# Patient Record
Sex: MALE | Race: BLACK OR AFRICAN AMERICAN | NOT HISPANIC OR LATINO | URBAN - METROPOLITAN AREA
[De-identification: names, ages, dates, MRNs, and addresses within clinical notes are randomized per-mention and may not be internally consistent; named-entity substitution may affect disease eponyms.]

---

## 2018-08-22 ENCOUNTER — EMERGENCY (EMERGENCY)
Facility: HOSPITAL | Age: 78
LOS: 0 days | Discharge: HOME | End: 2018-08-22
Attending: EMERGENCY MEDICINE
Payer: COMMERCIAL

## 2018-08-22 VITALS
HEIGHT: 68 IN | HEART RATE: 83 BPM | TEMPERATURE: 98 F | SYSTOLIC BLOOD PRESSURE: 149 MMHG | DIASTOLIC BLOOD PRESSURE: 84 MMHG | WEIGHT: 169.98 LBS | RESPIRATION RATE: 19 BRPM | OXYGEN SATURATION: 98 %

## 2018-08-22 VITALS
OXYGEN SATURATION: 99 % | SYSTOLIC BLOOD PRESSURE: 140 MMHG | TEMPERATURE: 98 F | RESPIRATION RATE: 18 BRPM | HEART RATE: 81 BPM | DIASTOLIC BLOOD PRESSURE: 85 MMHG

## 2018-08-22 DIAGNOSIS — M54.5 LOW BACK PAIN: ICD-10-CM

## 2018-08-22 DIAGNOSIS — M54.2 CERVICALGIA: ICD-10-CM

## 2018-08-22 DIAGNOSIS — E11.9 TYPE 2 DIABETES MELLITUS WITHOUT COMPLICATIONS: ICD-10-CM

## 2018-08-22 LAB — GLUCOSE BLDC GLUCOMTR-MCNC: 146 MG/DL — HIGH (ref 70–99)

## 2018-08-22 PROCEDURE — 99284 EMERGENCY DEPT VISIT MOD MDM: CPT

## 2018-08-22 RX ORDER — IBUPROFEN 200 MG
1 TABLET ORAL
Qty: 28 | Refills: 0
Start: 2018-08-22 | End: 2018-08-28

## 2018-08-22 RX ORDER — IBUPROFEN 200 MG
600 TABLET ORAL ONCE
Qty: 0 | Refills: 0 | Status: COMPLETED | OUTPATIENT
Start: 2018-08-22 | End: 2018-08-22

## 2018-08-22 RX ORDER — CYCLOBENZAPRINE HYDROCHLORIDE 10 MG/1
1 TABLET, FILM COATED ORAL
Qty: 15 | Refills: 0
Start: 2018-08-22 | End: 2018-08-26

## 2018-08-22 RX ORDER — CYCLOBENZAPRINE HYDROCHLORIDE 10 MG/1
10 TABLET, FILM COATED ORAL ONCE
Qty: 0 | Refills: 0 | Status: COMPLETED | OUTPATIENT
Start: 2018-08-22 | End: 2018-08-22

## 2018-08-22 RX ADMIN — Medication 600 MILLIGRAM(S): at 19:33

## 2018-08-22 RX ADMIN — CYCLOBENZAPRINE HYDROCHLORIDE 10 MILLIGRAM(S): 10 TABLET, FILM COATED ORAL at 19:33
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
## 2018-08-22 NOTE — ED PROVIDER NOTE - MUSCULOSKELETAL, MLM
Spine appears normal, range of motion is not limited, no muscle or joint tenderness. Has l. trapezius muscle soreness
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
## 2018-08-22 NOTE — ED PROVIDER NOTE - MEDICAL DECISION MAKING DETAILS
Ddx: Msk back and neck pain, no neuro deficits  Plan: ibuprofen, flexeril, d/c Pt feels well, declines any CT scans.

## 2018-08-22 NOTE — ED ADULT NURSE NOTE - NSIMPLEMENTINTERV_GEN_ALL_ED
Implemented All Universal Safety Interventions:  [place] to call system. Call bell, personal items and telephone within reach. Instruct patient to call for assistance. Room bathroom lighting operational. Non-slip footwear when patient is off stretcher. Physically safe environment: no spills, clutter or unnecessary equipment. Stretcher in lowest position, wheels locked, appropriate side rails in place.

## 2018-12-24 ENCOUNTER — EMERGENCY (EMERGENCY)
Facility: HOSPITAL | Age: 78
LOS: 0 days | Discharge: HOME | End: 2018-12-24
Attending: EMERGENCY MEDICINE
Payer: MEDICARE

## 2018-12-24 VITALS
SYSTOLIC BLOOD PRESSURE: 97 MMHG | TEMPERATURE: 98 F | HEART RATE: 63 BPM | HEIGHT: 68 IN | DIASTOLIC BLOOD PRESSURE: 56 MMHG | RESPIRATION RATE: 18 BRPM | WEIGHT: 167.99 LBS

## 2018-12-24 VITALS
HEART RATE: 70 BPM | OXYGEN SATURATION: 98 % | RESPIRATION RATE: 18 BRPM | TEMPERATURE: 98 F | SYSTOLIC BLOOD PRESSURE: 119 MMHG | DIASTOLIC BLOOD PRESSURE: 60 MMHG

## 2018-12-24 DIAGNOSIS — H57.12 OCULAR PAIN, LEFT EYE: ICD-10-CM

## 2018-12-24 DIAGNOSIS — H43.392 OTHER VITREOUS OPACITIES, LEFT EYE: ICD-10-CM

## 2018-12-24 LAB — GLUCOSE BLDC GLUCOMTR-MCNC: 197 MG/DL — HIGH (ref 70–99)

## 2018-12-24 PROCEDURE — 99282 EMERGENCY DEPT VISIT SF MDM: CPT

## 2018-12-24 NOTE — ED ADULT NURSE NOTE - OBJECTIVE STATEMENT
pt c/o of blurred vision worsening since Saturday in left eye, Pt states " I feel a string infront of my left eyebrow". Pt wears glasses at baseline. PMH diabetes. pt denies dizziness, headache. Pt does not have list of medications but states he takes medication as prescribed , unaware of dosages at this time. PMH cataract right eye. pt denies pain in eyes. Upon  no swelling or drianage form left eye

## 2018-12-24 NOTE — ED PROVIDER NOTE - OBJECTIVE STATEMENT
Pertinent PMH/PSH/FHx/SHx and Review of Systems contained within:  Patient presents to the ED for floaters in left eye that started 3 days ago.  now resolving but "wanted to get checked."  PMH of [place], cataract surgery.  VSs.  no trauma..  no other concersn.  now improving.  Non toxic.  Well appearing. No aggravating or relieving factors. No other pertinent PMH.  No other pertinent PSH.  No other pertinent FHx.  Patient denies EtOH/tobacco/illicit substance use. No fever/chills, No photophobia/eye pain/changes in vision, No ear pain/sore throat/dysphagia, No chest pain/palpitations, no SOB/cough/wheeze/stridor, No abdominal pain, No N/V/D, no dysuria/frequency/discharge, No neck/back pain, no rash, no changes in neurological status/function.

## 2018-12-24 NOTE — ED PROVIDER NOTE - MEDICAL DECISION MAKING DETAILS
Patient with floaters.  vision not affected.  has optho to f/u this week.  VSS.  otherwise baseline.  no trauma.  will f/u.  good teachback.  Discussed results and outcome of testing with the patient.  Patient advised to please follow up with their primary care doctor within the next 24 hours and return to the Emergency Department for worsening symptoms or any other concerns.  Patient advised that their doctor may call  to follow up on the specific results of the tests performed today in the emergency department.

## 2018-12-24 NOTE — ED PROVIDER NOTE - PHYSICAL EXAMINATION
Gen: Alert, NAD Head: NC, AT, PERRL, EOMI, normal lids/conjunctiva, no abrasion with stain, IOP 14/16, normal fields, ENT: normal hearing, patent oropharynx without erythema/exudate, uvula midline Neck: +supple, no tenderness/meningismus/JVD, +Trachea midline Pulm:  normal resp effort,  CV: +dist pulses Mskel: no edema/erythema/cyanosis Skin: no rash Neuro: AAOx3, no sensory/motor deficits, CN 2-12 intact Gen: Alert, NAD Head: NC, AT, PERRL, EOMI, normal lids/conjunctiva, no abrasion with stain, IOP 10/11, normal fields, ENT: normal hearing, patent oropharynx without erythema/exudate, uvula midline Neck: +supple, no tenderness/meningismus/JVD, +Trachea midline Pulm:  normal resp effort,  CV: +dist pulses Mskel: no edema/erythema/cyanosis Skin: no rash Neuro: AAOx3, no sensory/motor deficits, CN 2-12 intact

## 2018-12-24 NOTE — ED ADULT NURSE NOTE - NSIMPLEMENTINTERV_GEN_ALL_ED
Implemented All Universal Safety Interventions:  [place] to call system. Call bell, personal items and telephone within reach. Instruct patient to call for assistance. Room bathroom lighting operational. Non-slip footwear when patient is off stretcher. Physically safe environment: no spills, clutter or unnecessary equipment. Stretcher in lowest position, wheels locked, appropriate side rails in place.

## 2023-02-16 ENCOUNTER — INPATIENT (INPATIENT)
Facility: HOSPITAL | Age: 83
LOS: 2 days | Discharge: ANOTHER HOSPITAL | End: 2023-02-19
Attending: INTERNAL MEDICINE | Admitting: INTERNAL MEDICINE
Payer: MEDICARE

## 2023-02-16 VITALS
HEART RATE: 99 BPM | SYSTOLIC BLOOD PRESSURE: 188 MMHG | WEIGHT: 169.98 LBS | OXYGEN SATURATION: 100 % | DIASTOLIC BLOOD PRESSURE: 98 MMHG | HEIGHT: 68 IN | TEMPERATURE: 98 F | RESPIRATION RATE: 16 BRPM
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
## 2023-02-16 LAB
ALBUMIN SERPL ELPH-MCNC: 3.3 G/DL — SIGNIFICANT CHANGE UP (ref 3.3–5)
ALP SERPL-CCNC: 75 U/L — SIGNIFICANT CHANGE UP (ref 40–120)
ALT FLD-CCNC: 19 U/L — SIGNIFICANT CHANGE UP (ref 12–78)
ANION GAP SERPL CALC-SCNC: 9 MMOL/L — SIGNIFICANT CHANGE UP (ref 5–17)
APPEARANCE UR: CLEAR — SIGNIFICANT CHANGE UP
APTT BLD: 32.9 SEC — SIGNIFICANT CHANGE UP (ref 27.5–35.5)
AST SERPL-CCNC: 13 U/L — LOW (ref 15–37)
BACTERIA # UR AUTO: ABNORMAL
BASOPHILS # BLD AUTO: 0.02 K/UL — SIGNIFICANT CHANGE UP (ref 0–0.2)
BASOPHILS NFR BLD AUTO: 0.1 % — SIGNIFICANT CHANGE UP (ref 0–2)
BILIRUB SERPL-MCNC: 1 MG/DL — SIGNIFICANT CHANGE UP (ref 0.2–1.2)
BILIRUB UR-MCNC: NEGATIVE — SIGNIFICANT CHANGE UP
BLD GP AB SCN SERPL QL: SIGNIFICANT CHANGE UP
BUN SERPL-MCNC: 56 MG/DL — HIGH (ref 7–23)
CALCIUM SERPL-MCNC: 9 MG/DL — SIGNIFICANT CHANGE UP (ref 8.5–10.1)
CHLORIDE SERPL-SCNC: 113 MMOL/L — HIGH (ref 96–108)
CO2 SERPL-SCNC: 21 MMOL/L — LOW (ref 22–31)
COLOR SPEC: YELLOW — SIGNIFICANT CHANGE UP
CREAT SERPL-MCNC: 4.93 MG/DL — HIGH (ref 0.5–1.3)
DIFF PNL FLD: ABNORMAL
EGFR: 11 ML/MIN/1.73M2 — LOW
EOSINOPHIL # BLD AUTO: 0 K/UL — SIGNIFICANT CHANGE UP (ref 0–0.5)
EOSINOPHIL NFR BLD AUTO: 0 % — SIGNIFICANT CHANGE UP (ref 0–6)
EPI CELLS # UR: SIGNIFICANT CHANGE UP
FLUAV AG NPH QL: SIGNIFICANT CHANGE UP
FLUBV AG NPH QL: SIGNIFICANT CHANGE UP
GLUCOSE SERPL-MCNC: 140 MG/DL — HIGH (ref 70–99)
GLUCOSE UR QL: 1000 MG/DL
HCT VFR BLD CALC: 37.2 % — LOW (ref 39–50)
HGB BLD-MCNC: 11.6 G/DL — LOW (ref 13–17)
IMM GRANULOCYTES NFR BLD AUTO: 0.6 % — SIGNIFICANT CHANGE UP (ref 0–0.9)
INR BLD: 1.15 RATIO — SIGNIFICANT CHANGE UP (ref 0.88–1.16)
KETONES UR-MCNC: ABNORMAL
LEUKOCYTE ESTERASE UR-ACNC: NEGATIVE — SIGNIFICANT CHANGE UP
LYMPHOCYTES # BLD AUTO: 0.47 K/UL — LOW (ref 1–3.3)
LYMPHOCYTES # BLD AUTO: 2.6 % — LOW (ref 13–44)
MCHC RBC-ENTMCNC: 27.8 PG — SIGNIFICANT CHANGE UP (ref 27–34)
MCHC RBC-ENTMCNC: 31.2 G/DL — LOW (ref 32–36)
MCV RBC AUTO: 89 FL — SIGNIFICANT CHANGE UP (ref 80–100)
MONOCYTES # BLD AUTO: 0.94 K/UL — HIGH (ref 0–0.9)
MONOCYTES NFR BLD AUTO: 5.2 % — SIGNIFICANT CHANGE UP (ref 2–14)
NEUTROPHILS # BLD AUTO: 16.47 K/UL — HIGH (ref 1.8–7.4)
NEUTROPHILS NFR BLD AUTO: 91.5 % — HIGH (ref 43–77)
NITRITE UR-MCNC: NEGATIVE — SIGNIFICANT CHANGE UP
NRBC # BLD: 0 /100 WBCS — SIGNIFICANT CHANGE UP (ref 0–0)
PH UR: 6 — SIGNIFICANT CHANGE UP (ref 5–8)
PLATELET # BLD AUTO: 166 K/UL — SIGNIFICANT CHANGE UP (ref 150–400)
POTASSIUM SERPL-MCNC: 4.8 MMOL/L — SIGNIFICANT CHANGE UP (ref 3.5–5.3)
POTASSIUM SERPL-SCNC: 4.8 MMOL/L — SIGNIFICANT CHANGE UP (ref 3.5–5.3)
PROT SERPL-MCNC: 7.3 GM/DL — SIGNIFICANT CHANGE UP (ref 6–8.3)
PROT UR-MCNC: 500 MG/DL
PROTHROM AB SERPL-ACNC: 13.7 SEC — HIGH (ref 10.5–13.4)
RBC # BLD: 4.18 M/UL — LOW (ref 4.2–5.8)
RBC # FLD: 14.5 % — SIGNIFICANT CHANGE UP (ref 10.3–14.5)
RBC CASTS # UR COMP ASSIST: SIGNIFICANT CHANGE UP /HPF (ref 0–4)
SARS-COV-2 RNA SPEC QL NAA+PROBE: DETECTED
SODIUM SERPL-SCNC: 143 MMOL/L — SIGNIFICANT CHANGE UP (ref 135–145)
SP GR SPEC: 1.01 — SIGNIFICANT CHANGE UP (ref 1.01–1.02)
UROBILINOGEN FLD QL: NEGATIVE MG/DL — SIGNIFICANT CHANGE UP
WBC # BLD: 18 K/UL — HIGH (ref 3.8–10.5)
WBC # FLD AUTO: 18 K/UL — HIGH (ref 3.8–10.5)
WBC UR QL: NEGATIVE — SIGNIFICANT CHANGE UP

## 2023-02-16 PROCEDURE — 93010 ELECTROCARDIOGRAM REPORT: CPT

## 2023-02-16 PROCEDURE — 72125 CT NECK SPINE W/O DYE: CPT | Mod: 26,MA

## 2023-02-16 PROCEDURE — 73552 X-RAY EXAM OF FEMUR 2/>: CPT | Mod: 26,RT

## 2023-02-16 PROCEDURE — 71045 X-RAY EXAM CHEST 1 VIEW: CPT | Mod: 26

## 2023-02-16 PROCEDURE — 70450 CT HEAD/BRAIN W/O DYE: CPT | Mod: 26,MA

## 2023-02-16 PROCEDURE — 74176 CT ABD & PELVIS W/O CONTRAST: CPT | Mod: 26,MA

## 2023-02-16 PROCEDURE — 99285 EMERGENCY DEPT VISIT HI MDM: CPT

## 2023-02-16 PROCEDURE — 73502 X-RAY EXAM HIP UNI 2-3 VIEWS: CPT | Mod: 26,RT

## 2023-02-16 RX ORDER — MORPHINE SULFATE 50 MG/1
4 CAPSULE, EXTENDED RELEASE ORAL ONCE
Refills: 0 | Status: DISCONTINUED | OUTPATIENT
Start: 2023-02-16 | End: 2023-02-16

## 2023-02-16 RX ORDER — CEFTRIAXONE 500 MG/1
2000 INJECTION, POWDER, FOR SOLUTION INTRAMUSCULAR; INTRAVENOUS ONCE
Refills: 0 | Status: COMPLETED | OUTPATIENT
Start: 2023-02-16 | End: 2023-02-16

## 2023-02-16 RX ORDER — AZITHROMYCIN 500 MG/1
500 TABLET, FILM COATED ORAL ONCE
Refills: 0 | Status: COMPLETED | OUTPATIENT
Start: 2023-02-16 | End: 2023-02-16

## 2023-02-16 RX ADMIN — MORPHINE SULFATE 4 MILLIGRAM(S): 50 CAPSULE, EXTENDED RELEASE ORAL at 19:34

## 2023-02-16 NOTE — ED ADULT NURSE NOTE - ED STAT RN HANDOFF DETAILS
Report received from RN at this time. Assessment available on [facility]. will continue to monitor

## 2023-02-16 NOTE — ED PROVIDER NOTE - OBJECTIVE STATEMENT
82M PMHx of DM presenting with right hip pain s/p mechanical fall today. Describes slipping in the kitchen and falling on his right side w/ right sided hip pain mild. Denies any LOC. Denies any antiplatelet or AC.     Otherwise: denies any chest pain, abdominal pain, shortness of breath, nausea/vomiting, head trauma, neck pain, back pain, hip pain, other extremity injury, LOC, syncope, lightheadedness, anticoagulation use, headaches, visual complaints, rashes, fevers/chills, difficulty ambulating, abrasions, lacerations.

## 2023-02-16 NOTE — ED PROVIDER NOTE - CLINICAL SUMMARY MEDICAL DECISION MAKING FREE TEXT BOX
Pt with no hx of anticoagulation, HTN p/w right hip pain, right fem neck fx, s/p mechanical fall today ( no ) LOC. Normal appearing without any signs or symptoms of serious injury on secondary trauma survey. Low suspicion for intracranial hemorrhage or other intracranial traumatic injury. No periorbital or posterior periauricular ecchymosis to suggest skull fracture. No abdominal ecchymosis to indicate concern for serious trauma to the thorax or abdomen. Pelvis without evidence of injury and patient is neurologically intact. Stable gait and tolerating PO. Otherwise no other evidence of deformity or joint instability.  - CT HEAD and NECK  - XR hip femur = fem neck fx right sided.  - pre-op labs, Found to be in ARF, Cr 4.9, (+) COVID, elevated WBC.  CXR (+) RLL RUL, pna?  - Septic labs, ceftriaxone/azithromycin for pna, IVF.  - Pain control PRN, Anti-emetics PRN  - ortho consult  - admit medical

## 2023-02-16 NOTE — ED ADULT TRIAGE NOTE - SOURCE OF INFORMATION
Called and spoke with the patient. Informed them of the results/orders. Understanding was verbalized and they agreed to follow Dr. Lilliana Ortiz orders. Patient/EMS

## 2023-02-16 NOTE — ED ADULT NURSE NOTE - CHIEF COMPLAINT QUOTE
BIBA as per patient c/o R hip pain s/t mechanical fall at 11am this morning. RLE appears rotated and shortened.  Hx: DM

## 2023-02-16 NOTE — ED ADULT NURSE NOTE - OBJECTIVE STATEMENT
82M PMHx DM presents to the ED s/p mechanical fall at gas station today., patient states I think I fractured my right hip , I cant stand on it. denies loc denies blood thinner use

## 2023-02-16 NOTE — ED ADULT TRIAGE NOTE - WEIGHT IN KG
Reason for Call:  Form, our goal is to have forms completed with 72 hours, however, some forms may require a visit or additional information.    Type of letter, form or note:  medical    Who is the form from?: Home care    Where did the form come from: form was faxed in    What clinic location was the form placed at?: [facility]    Where the form was placed: 's Box: Marbin Rouse MD    What number is listed as a contact on the form?: [phone]       Additional comments: SN 1 w 2, 3 as needed; HA 1 day 1; HA 1 w 2    Call taken on 5/17/2018 at 10:44 AM by Rachana Ontiveros         77.1

## 2023-02-16 NOTE — ED PROVIDER NOTE - CARE PLAN
1 Principal Discharge DX:	Femoral neck fracture  Secondary Diagnosis:	Acute renal failure  Secondary Diagnosis:	Pneumonia

## 2023-02-16 NOTE — ED ADULT NURSE REASSESSMENT NOTE - NS ED NURSE REASSESS COMMENT FT1
Safety checks compld + maintd. T+P Q encouraged. IV sites checked Q2+remains WDL. Skin care and complete care rendered. Fall +skin precs in place. Call bell within reach and safety measures in place. Son at bedside and belongings left behind bed. AOx3 but some episodes of forgetfulness noted. Reoriented

## 2023-02-16 NOTE — ED ADULT TRIAGE NOTE - CHIEF COMPLAINT QUOTE
BIBA as per patient c/o R hip pain s/t mechanical fall at 11am this morning. RLE appears rotated and shortened. BIBA as per patient c/o R hip pain s/t mechanical fall at 11am this morning. RLE appears rotated and shortened.  Hx: DM

## 2023-02-17 PROBLEM — E11.9 TYPE 2 DIABETES MELLITUS WITHOUT COMPLICATIONS: Chronic | Status: ACTIVE | Noted: 2018-12-24

## 2023-02-17 PROBLEM — I50.9 HEART FAILURE, UNSPECIFIED: Chronic | Status: ACTIVE | Noted: 2018-12-24

## 2023-02-17 LAB
ABO RH CONFIRMATION: SIGNIFICANT CHANGE UP
ANION GAP SERPL CALC-SCNC: 17 MMOL/L — SIGNIFICANT CHANGE UP (ref 5–17)
APTT BLD: 33.9 SEC — SIGNIFICANT CHANGE UP (ref 27.5–35.5)
BUN SERPL-MCNC: 57 MG/DL — HIGH (ref 7–23)
CALCIUM SERPL-MCNC: 8.7 MG/DL — SIGNIFICANT CHANGE UP (ref 8.5–10.1)
CHLORIDE SERPL-SCNC: 112 MMOL/L — HIGH (ref 96–108)
CK SERPL-CCNC: 145 U/L — SIGNIFICANT CHANGE UP (ref 26–308)
CO2 SERPL-SCNC: 15 MMOL/L — LOW (ref 22–31)
CREAT SERPL-MCNC: 4.56 MG/DL — HIGH (ref 0.5–1.3)
EGFR: 12 ML/MIN/1.73M2 — LOW
GLUCOSE BLDC GLUCOMTR-MCNC: 343 MG/DL — HIGH (ref 70–99)
GLUCOSE SERPL-MCNC: 166 MG/DL — HIGH (ref 70–99)
HCT VFR BLD CALC: 40.1 % — SIGNIFICANT CHANGE UP (ref 39–50)
HGB BLD-MCNC: 12.2 G/DL — LOW (ref 13–17)
INR BLD: 1.23 RATIO — HIGH (ref 0.88–1.16)
LACTATE SERPL-SCNC: 1.7 MMOL/L — SIGNIFICANT CHANGE UP (ref 0.7–2)
LACTATE SERPL-SCNC: 5.1 MMOL/L — CRITICAL HIGH (ref 0.7–2)
MCHC RBC-ENTMCNC: 27.5 PG — SIGNIFICANT CHANGE UP (ref 27–34)
MCHC RBC-ENTMCNC: 30.4 G/DL — LOW (ref 32–36)
MCV RBC AUTO: 90.5 FL — SIGNIFICANT CHANGE UP (ref 80–100)
NRBC # BLD: 0 /100 WBCS — SIGNIFICANT CHANGE UP (ref 0–0)
NT-PROBNP SERPL-SCNC: 6136 PG/ML — HIGH (ref 0–450)
PLATELET # BLD AUTO: 179 K/UL — SIGNIFICANT CHANGE UP (ref 150–400)
POTASSIUM SERPL-MCNC: 4.8 MMOL/L — SIGNIFICANT CHANGE UP (ref 3.5–5.3)
POTASSIUM SERPL-SCNC: 4.8 MMOL/L — SIGNIFICANT CHANGE UP (ref 3.5–5.3)
PROTHROM AB SERPL-ACNC: 14.8 SEC — HIGH (ref 10.5–13.4)
RBC # BLD: 4.43 M/UL — SIGNIFICANT CHANGE UP (ref 4.2–5.8)
RBC # FLD: 14.5 % — SIGNIFICANT CHANGE UP (ref 10.3–14.5)
SODIUM SERPL-SCNC: 144 MMOL/L — SIGNIFICANT CHANGE UP (ref 135–145)
TROPONIN I, HIGH SENSITIVITY RESULT: 135.5 NG/L — HIGH
TROPONIN I, HIGH SENSITIVITY RESULT: 204.8 NG/L — HIGH
TROPONIN I, HIGH SENSITIVITY RESULT: 251 NG/L — HIGH
TROPONIN I, HIGH SENSITIVITY RESULT: 255.1 NG/L — HIGH
WBC # BLD: 19.22 K/UL — HIGH (ref 3.8–10.5)
WBC # FLD AUTO: 19.22 K/UL — HIGH (ref 3.8–10.5)

## 2023-02-17 PROCEDURE — 99223 1ST HOSP IP/OBS HIGH 75: CPT

## 2023-02-17 PROCEDURE — 93306 TTE W/DOPPLER COMPLETE: CPT | Mod: 26

## 2023-02-17 RX ORDER — DEXTROSE 50 % IN WATER 50 %
25 SYRINGE (ML) INTRAVENOUS ONCE
Refills: 0 | Status: DISCONTINUED | OUTPATIENT
Start: 2023-02-17 | End: 2023-02-19

## 2023-02-17 RX ORDER — SODIUM CHLORIDE 9 MG/ML
1000 INJECTION INTRAMUSCULAR; INTRAVENOUS; SUBCUTANEOUS ONCE
Refills: 0 | Status: COMPLETED | OUTPATIENT
Start: 2023-02-17 | End: 2023-02-17

## 2023-02-17 RX ORDER — PREDNISOLONE SODIUM PHOSPHATE 1 %
0 DROPS OPHTHALMIC (EYE)
Qty: 0 | Refills: 0 | DISCHARGE

## 2023-02-17 RX ORDER — DORZOLAMIDE HYDROCHLORIDE TIMOLOL MALEATE 20; 5 MG/ML; MG/ML
0 SOLUTION/ DROPS OPHTHALMIC
Qty: 0 | Refills: 0 | DISCHARGE

## 2023-02-17 RX ORDER — LOSARTAN POTASSIUM 100 MG/1
50 TABLET, FILM COATED ORAL DAILY
Refills: 0 | Status: DISCONTINUED | OUTPATIENT
Start: 2023-02-17 | End: 2023-02-17

## 2023-02-17 RX ORDER — DEXTROSE 50 % IN WATER 50 %
15 SYRINGE (ML) INTRAVENOUS ONCE
Refills: 0 | Status: DISCONTINUED | OUTPATIENT
Start: 2023-02-17 | End: 2023-02-19

## 2023-02-17 RX ORDER — SODIUM CHLORIDE 9 MG/ML
1000 INJECTION, SOLUTION INTRAVENOUS
Refills: 0 | Status: DISCONTINUED | OUTPATIENT
Start: 2023-02-17 | End: 2023-02-19

## 2023-02-17 RX ORDER — SODIUM CHLORIDE 9 MG/ML
1000 INJECTION, SOLUTION INTRAVENOUS ONCE
Refills: 0 | Status: COMPLETED | OUTPATIENT
Start: 2023-02-17 | End: 2023-02-17

## 2023-02-17 RX ORDER — LOSARTAN POTASSIUM 100 MG/1
0 TABLET, FILM COATED ORAL
Qty: 0 | Refills: 0 | DISCHARGE

## 2023-02-17 RX ORDER — GLUCAGON INJECTION, SOLUTION 0.5 MG/.1ML
1 INJECTION, SOLUTION SUBCUTANEOUS ONCE
Refills: 0 | Status: DISCONTINUED | OUTPATIENT
Start: 2023-02-17 | End: 2023-02-19

## 2023-02-17 RX ORDER — CEFTRIAXONE 500 MG/1
1000 INJECTION, POWDER, FOR SOLUTION INTRAMUSCULAR; INTRAVENOUS EVERY 24 HOURS
Refills: 0 | Status: DISCONTINUED | OUTPATIENT
Start: 2023-02-18 | End: 2023-02-19

## 2023-02-17 RX ORDER — BRIMONIDINE TARTRATE 2 MG/MG
0 SOLUTION/ DROPS OPHTHALMIC
Qty: 0 | Refills: 3 | DISCHARGE

## 2023-02-17 RX ORDER — AZITHROMYCIN 500 MG/1
500 TABLET, FILM COATED ORAL EVERY 24 HOURS
Refills: 0 | Status: DISCONTINUED | OUTPATIENT
Start: 2023-02-18 | End: 2023-02-19

## 2023-02-17 RX ORDER — INSULIN LISPRO 100/ML
VIAL (ML) SUBCUTANEOUS
Refills: 0 | Status: DISCONTINUED | OUTPATIENT
Start: 2023-02-17 | End: 2023-02-19

## 2023-02-17 RX ORDER — LABETALOL HCL 100 MG
5 TABLET ORAL ONCE
Refills: 0 | Status: COMPLETED | OUTPATIENT
Start: 2023-02-17 | End: 2023-02-17

## 2023-02-17 RX ORDER — CARVEDILOL PHOSPHATE 80 MG/1
12.5 CAPSULE, EXTENDED RELEASE ORAL EVERY 12 HOURS
Refills: 0 | Status: DISCONTINUED | OUTPATIENT
Start: 2023-02-17 | End: 2023-02-18

## 2023-02-17 RX ORDER — HEPARIN SODIUM 5000 [USP'U]/ML
5000 INJECTION INTRAVENOUS; SUBCUTANEOUS ONCE
Refills: 0 | Status: COMPLETED | OUTPATIENT
Start: 2023-02-17 | End: 2023-02-17

## 2023-02-17 RX ORDER — HYDRALAZINE HCL 50 MG
50 TABLET ORAL THREE TIMES A DAY
Refills: 0 | Status: DISCONTINUED | OUTPATIENT
Start: 2023-02-17 | End: 2023-02-19

## 2023-02-17 RX ORDER — ATORVASTATIN CALCIUM 80 MG/1
40 TABLET, FILM COATED ORAL AT BEDTIME
Refills: 0 | Status: DISCONTINUED | OUTPATIENT
Start: 2023-02-17 | End: 2023-02-19

## 2023-02-17 RX ORDER — DEXTROSE 50 % IN WATER 50 %
12.5 SYRINGE (ML) INTRAVENOUS ONCE
Refills: 0 | Status: DISCONTINUED | OUTPATIENT
Start: 2023-02-17 | End: 2023-02-19

## 2023-02-17 RX ADMIN — CEFTRIAXONE 100 MILLIGRAM(S): 500 INJECTION, POWDER, FOR SOLUTION INTRAMUSCULAR; INTRAVENOUS at 02:41

## 2023-02-17 RX ADMIN — Medication 5 MILLIGRAM(S): at 06:11

## 2023-02-17 RX ADMIN — Medication 50 MILLIGRAM(S): at 17:41

## 2023-02-17 RX ADMIN — SODIUM CHLORIDE 1000 MILLILITER(S): 9 INJECTION INTRAMUSCULAR; INTRAVENOUS; SUBCUTANEOUS at 05:23

## 2023-02-17 RX ADMIN — AZITHROMYCIN 500 MILLIGRAM(S): 500 TABLET, FILM COATED ORAL at 02:40

## 2023-02-17 RX ADMIN — SODIUM CHLORIDE 1000 MILLILITER(S): 9 INJECTION, SOLUTION INTRAVENOUS at 03:34

## 2023-02-17 RX ADMIN — CARVEDILOL PHOSPHATE 12.5 MILLIGRAM(S): 80 CAPSULE, EXTENDED RELEASE ORAL at 17:41

## 2023-02-17 RX ADMIN — ATORVASTATIN CALCIUM 40 MILLIGRAM(S): 80 TABLET, FILM COATED ORAL at 21:51

## 2023-02-17 RX ADMIN — Medication 50 MILLIGRAM(S): at 21:50

## 2023-02-17 RX ADMIN — LOSARTAN POTASSIUM 50 MILLIGRAM(S): 100 TABLET, FILM COATED ORAL at 04:14

## 2023-02-17 RX ADMIN — SODIUM CHLORIDE 1000 MILLILITER(S): 9 INJECTION INTRAMUSCULAR; INTRAVENOUS; SUBCUTANEOUS at 06:14

## 2023-02-17 RX ADMIN — Medication 50 MILLIGRAM(S): at 04:14

## 2023-02-17 NOTE — H&P ADULT - ASSESSMENT
83 yo male w/ pmhx of CKD, DM, HTN presented to the ED BIBEMS 2/2 due to fall.        Right hip femoral fracture  - 2/2 due to fall  - consent obtained by ortho with son to procedure  - recommend Cardiology consult, given  elevated troponin, elevated BNP  - intermediate risk surgery    Pneumonia  - noted on imaging  -     COVID- 19   - No evidence of Hypoxia  - Continue to trend inflammatory markers      Delirium  - multifactorial in the acute setting from Hip fracture as well as PNA  - frequent orientation to time and place  - UA not suggestive for UTI      Elevated Tropinin  - continue to trend  - unable to assess if any chest pain, patient is currently confused    CHF  - no evidence of fluid overload at this time  - elevated BNP      DM  -ISS    Essential HTN  - resume home medication with holding parameters      Preventive measures  - DVT- SCD- as patient one medically optimize will being going to the OR  83 yo male w/ pmhx of CKD, DM, HTN presented to the ED BIBEMS 2/2 due to fall.        Right hip femoral fracture  - 2/2 due to fall  - consent obtained by ortho with son to procedure  - recommend Cardiology consult, given  elevated troponin, elevated BNP  - intermediate risk surgery  - Given patient age and as well as comorbidities, patient is high risk for postoperative complication.  - revised cardiac risk 2 points, 10.1 % 30 days risk of death, MI or cardiac arrest     Pneumonia  - noted on imaging  - c/w Rocephin as well as azithromycin     COVID- 19   - No evidence of Hypoxia  - Continue to trend inflammatory markers      Delirium  - multifactorial in the acute setting from Hip fracture as well as PNA  - frequent orientation to time and place  - UA not suggestive for UTI      Elevated Troponin  - continue to trend  - unable to assess if any chest pain, patient is currently confused  - TTE pending    CHF  - no evidence of fluid overload at this time  - elevated BNP      DM  -ISS    Essential HTN  - resume home medication with holding parameters      Preventive measures  - DVT- SCD- as patient one medically optimize will being going to the OR

## 2023-02-17 NOTE — ED ADULT NURSE REASSESSMENT NOTE - NS ED NURSE REASSESS COMMENT FT1
Safety checks compld + maintd. Noted attempting to get OOb. Requesting to get up but unable to. T+P Q encouraged. IV sites checked Q2+remains WDL. Skin care and complete care rendered. Fall +skin precs in place. Call bell within reach and safety measures in place. No family at bedside at this time. belongings left behind bed. AOx3 but some episodes of forgetfulness noted. Reoriented

## 2023-02-17 NOTE — CONSULT NOTE ADULT - ATTENDING COMMENTS
Above note reviewed. Agree with above.    The risks and benefits of operative and nonoperative management were discussed with the patient's HCP in detail. We discussed that without operative intervention, there would be prolonged bed rest associated with significant morbidity and mortality including but not limited to risk of fracture nonunion or malunion, chronic pain, limb length discrepancies, gait disturbance, pneumonia, thromboembolic event, bedsores, and death from associated complications. We discussed risks associated with surgery including not limited to infection, bleeding, wound complication, neurovascular injury, failure of fixation or reconstruction, instability, altered gait, and potential need for future surgery including complex reconstruction or even amputation. We extensively discussed the elevated risk of surgical intervention in the setting of patient's medical comorbidities that make morbidity and mortality risks extremely high. Given that the morbidity and mortality is high risk untreated hip fractures, we discussed that proceeding with surgical intervention in this setting of elevated surgical and anesthetic risk is reasonable. Risks associated with anesthesia were discussed including but not limited to myocardial infarction, stroke and death. Questions were sought and answered satisfactorily. After understanding risks, benefits, and alternatives to surgical management, informed consent was obtained.      Roddy Mckeon DO  Orthopedic and Spine Surgeon  [facility]

## 2023-02-17 NOTE — PATIENT PROFILE ADULT - DOES PATIENT HAVE ADVANCE DIRECTIVE
Pt presented to er with c/o abdominal pain on the rt upper quadrant of the abdomen , that radiated to the back, and upper back. Pt denies N/V at this time. Pt took 2 ASA and aleve PTA. Pt denies CP/SOB, denies  symptoms.    
No

## 2023-02-17 NOTE — H&P ADULT - NSHPLABSRESULTS_GEN_ALL_CORE
12.2   19.22  )----------(  179       ( 2023 08:00 )               40.1      144    |  112    |  57     ----------------------------<  166        ( 2023 08:00 )  4.8     |  15     |  4.56     Ca    8.7        ( 2023 08:00 )    TPro  7.3    /  Alb  3.3    /  TBili  1.0    /  DBili  x      /  AST  13     /  ALT  19     /  AlkPhos  75     ( 2023 19:25 )    LIVER FUNCTIONS - ( 2023 19:25 )  Alb: 3.3 g/dL / Pro: 7.3 gm/dL / ALK PHOS: 75 U/L / ALT: 19 U/L / AST: 13 U/L / GGT: x           PT/INR -  14.8 sec / 1.23 ratio   ( 2023 08:00 )       PTT -  33.9 sec   ( 2023 08:00 )  CAPILLARY BLOOD GLUCOSE        Urinalysis Basic - ( 2023 22:15 )    Color: Yellow / Appearance: Clear / S.015 / pH: x  Gluc: x / Ketone: Trace  / Bili: Negative / Urobili: Negative mg/dL   Blood: x / Protein: 500 mg/dL / Nitrite: Negative   Leuk Esterase: Negative / RBC: 0-2 /HPF / WBC Negative   Sq Epi: x / Non Sq Epi: Occasional / Bacteria: Occasional

## 2023-02-17 NOTE — PATIENT PROFILE ADULT - FUNCTIONAL ASSESSMENT - BASIC MOBILITY 6.
2-calculated by average/Not able to assess (calculate score using [facility] averaging method)

## 2023-02-17 NOTE — H&P ADULT - HISTORY OF PRESENT ILLNESS
81 yo male w/ pmhx of CKD, DM, HTN presented to the ED [facility] 2/2 due to fall. History obtained by Chart review as well as speaking to the son jay. The patient was at a gas station ambulating, slipped and fell landed on this right hip. Upon arrival to the ED, patient was noted to have COVID-19 PNA, as well as right hip fracture. Noted to have elevated BNP, trop x1 elevated. CXR performed showed evidence of Right upper lobe opacities.

## 2023-02-17 NOTE — PROVIDER CONTACT NOTE (CRITICAL VALUE NOTIFICATION) - ACTION/TREATMENT ORDERED:
Hospitalist called Hospitalist called and made aware. Morning meds given early for BP management. No orders at this time.

## 2023-02-17 NOTE — CONSULT NOTE ADULT - SUBJECTIVE AND OBJECTIVE BOX
Pt is an 82M with PMH of DM, HLD, CKD here after a mechanical fall earlier today. Per pt's son Oscar, pt was entering a gas station [facility] and tripped on a stair, unwitnessed by pt's son. Son was called by pt to bring home, at home pt was unable to stand or bear weight, and pt was BIBEMS to [facility]. In the ER xrays revealed a right femoral neck fracture, and ortho was consulted for further care.    T(C): 37 (02-16-23 @ 23:21), Max: 37 (02-16-23 @ 23:21)  T(F): 98.6 (02-16-23 @ 23:21), Max: 98.6 (02-16-23 @ 23:21)  HR: 97 (02-16-23 @ 23:21) (92 - 99)  BP: 186/104 (02-16-23 @ 23:21) (186/104 - 197/93)  RR: 18 (02-16-23 @ 23:21) (16 - 18)  SpO2: 95% (02-16-23 @ 23:21) (95% - 100%)    RLE:  Skin: No erythema, edema or gross lesions noted.   Hip TTP, otherwise NTTP  SILT L2-S1  Motor: +EHL/FHL/TA/GSc  Compartments soft and compressible  Calves NTTP b/l  +2 DP    Secondary Survey:   LLE/RUE/LUE: No TTP over bony prominences, SILT, palpable pulses, full/painless range of motion, compartments soft    Spine: No bony tenderness. No palpable stepoffs.    Imaging:  Radiographs of the R hip demonstrate a femoral neck fracture with complete displacement    A/p:  Pt is an 82M with a right FN fx s/p mechanical fx  NWB RLE/Bedrest/PT/OT  Pain regimen PRN  DVT ppx: per primary team  At this time pt is AO x1 and unable to participate in surgical consent  Son, Oscar, although not HCP did not want to give consent for surgery without discussing further w family  At this time if pt's mental status improve and pt becomes AOx4 he may consent, however as of now pt unable to consent for surgery on his own  Please document medical clearance for the OR  Dispo per PT  Plan discussed w patient, who is in agreement with the above  Plan discussed w attending, who is in agreement with the above   Pt is an 82M with PMH of DM, HLD, CKD here after a mechanical fall earlier today. Per pt's son Oscar, pt was entering a gas station [facility] and tripped on a stair, unwitnessed by pt's son. Son was called by pt to bring home, at home pt was unable to stand or bear weight, and pt was BIBEMS to [facility]. In the ER xrays revealed a right femoral neck fracture, and ortho was consulted for further care.    T(C): 37 (02-16-23 @ 23:21), Max: 37 (02-16-23 @ 23:21)  T(F): 98.6 (02-16-23 @ 23:21), Max: 98.6 (02-16-23 @ 23:21)  HR: 97 (02-16-23 @ 23:21) (92 - 99)  BP: 186/104 (02-16-23 @ 23:21) (186/104 - 197/93)  RR: 18 (02-16-23 @ 23:21) (16 - 18)  SpO2: 95% (02-16-23 @ 23:21) (95% - 100%)    RLE:  Skin: No erythema, edema or gross lesions noted.   Hip TTP, otherwise NTTP  SILT L2-S1  Motor: +EHL/FHL/TA/GSc  Compartments soft and compressible  Calves NTTP b/l  +2 DP    Secondary Survey:   LLE/RUE/LUE: No TTP over bony prominences, SILT, palpable pulses, full/painless range of motion, compartments soft    Spine: No bony tenderness. No palpable stepoffs.    Imaging:  Radiographs of the R hip demonstrate a femoral neck fracture with complete displacement    A/p:  Pt is an 82M with a right FN fx s/p mechanical fx  NWB RLE/Bedrest/PT/OT  Pain regimen PRN  DVT ppx: per primary team  At this time pt is AO x1 and unable to participate in surgical consent  Son, Oscar, did not want to give consent for surgery without discussing further w family  At this time if pt's mental status improve and pt becomes AOx4 he may consent, however as of now pt unable to consent for surgery on his own  Please document medical clearance for the OR  Dispo per PT  Plan discussed w patient, who is in agreement with the above  Plan discussed w attending, who is in agreement with the above    *** ADDENDUM (2/17/2023, 09:05) ***  - Patient consented for surgery via Son/HCP.   - Plan to move forward with Right Hip Hemiarthroplasty today pending Medical/Other clearances.

## 2023-02-17 NOTE — PATIENT PROFILE ADULT - FALL HARM RISK - HARM RISK INTERVENTIONS

## 2023-02-17 NOTE — H&P ADULT - NSHPPHYSICALEXAM_GEN_ALL_CORE
General: NAD, resting comfortably in bed  HEENT: NC/AT, PERRLA, EOMI  Neck: thyroid normal, no nodules, no lymphadenopathy, no JVD  Lungs: CTA bilaterally, no rhonchi, no wheezes  Cardio: S1S2+, RRR, no murmurs  Abdominal: soft, NT, ND, BS+  Back: no CVA tenderness, no ulcers visualized in the back  Extremities: Right hip tenderness to palpation  Neuro: AAOx1(self), CN 2-12 grossly intact.  sensation intact in all extremities, 5/5 strength

## 2023-02-17 NOTE — PHARMACOTHERAPY INTERVENTION NOTE - COMMENTS
Recommended to order a Legionella urinary antigen since patient has been empirically started on azithromycin for the treatment of pneumonia.    Marion Mei, PharmD, BCIDP  Clinical Pharmacy Specialist, Infectious Diseases  Tele-Antimicrobial Stewardship Program (Tele-ASP)  Tele-ASP Phone: [phone]

## 2023-02-17 NOTE — CONSULT NOTE ADULT - SUBJECTIVE AND OBJECTIVE BOX
CHIEF COMPLAINT:  Patient is a 82y old  Male who presents with a chief complaint of Hip fracture (2023 11:55)      HPI:  81 yo male w/ pmhx of CKD, DM, HTN presented to the ED [facility]  due to fall. History obtained by Chart review as well as speaking to the son jay. The patient was at a gas station ambulating, slipped and fell landed on this right hip. Upon arrival to the ED, patient was noted to have COVID-19 PNA, as well as right hip fracture. Noted to have elevated BNP, trop x1 elevated. CXR performed showed evidence of Right upper lobe opacities. (2023 11:55)    ALLERGIES:  No Known Allergies      Home Medications:  carvedilol 12.5 mg oral tablet: 1 tab(s) orally 2 times a day (2023 15:03)  FARXIGA 10 MG TABLET: take 1 tablet by mouth once daily (2023 15:03)  glyBURIDE 5 mg oral tablet: 1 tab(s) orally 4 times a day (2023 15:03)  HYDRALAZINE 50 MG TABLET: take 1 tablet by mouth once daily (2023 15:03)  Januvia 50 mg oral tablet: 1 tab(s) orally once a day (2023 15:03)  LOSARTAN 50MG TAB: 1 tab(s) orally once a day (2023 15:03)  metFORMIN 500 mg oral tablet, extended release: 1 tab(s) orally once a day (2023 15:03)  repaglinide 2 mg oral tablet: 1 tab(s) orally 3 times a day (before meals) (2023 15:03)  SIMVASTATIN 40 MG TABLET: take 1 tablet by mouth once daily (2023 15:03)    PAST MEDICAL & SURGICAL HISTORY:  DM (diabetes mellitus)      CHF (congestive heart failure)        FAMILY HISTORY:      SOCIAL HISTORY:    REVIEW OF SYSTEMS:  General:  No wt loss, fevers, chills, night sweats  Eyes:  Good vision, no reported pain  ENT:  No sore throat, pain, runny nose, dysphagia  CV:  No pain, palpitations, hypo/hypertension  Resp:  No dyspnea, cough, tachypnea, wheezing  GI:  No pain, nausea, vomiting, diarrhea, constipation  :  No pain, bleeding, incontinence, nocturia  Muscle:  No pain, weakness  Breast:  No pain, abscess, mass, discharge  Neuro:  No weakness, tingling, memory problems  Psych:  No fatigue, insomnia, mood problems, depression  Endocrine:  No polyuria, polydipsia, cold/heat intolerance  Heme:  No petechiae, ecchymosis, easy bruisability  Skin:  No rash, tattoos, scars, edema    PHYSICAL EXAM:  Vital Signs:  Vital Signs Last 24 Hrs  T(C): 37.1 (2023 11:55), Max: 37.1 (2023 11:55)  T(F): 98.7 (2023 11:55), Max: 98.7 (2023 11:55)  HR: 101 (2023 11:55) (91 - 109)  BP: 178/99 (2023 11:55) (175/100 - 203/109)  BP(mean): 128 (2023 19:19) (128 - 128)  RR: 18 (2023 11:55) (16 - 18)  SpO2: 93% (2023 11:55) (93% - 100%)    Parameters below as of 2023 04:55  Patient On (Oxygen Delivery Method): room air      Tele:     Constitutional: well developed, normal appearance, well groomed, well nourished, no deformities and no acute distress.   Eyes: the conjunctiva exhibited no abnormalities and the eyelids demonstrated no xanthelasmas.   HEENT: normal oral mucosa, no oral pallor and no oral cyanosis.   Neck: normal jugular venous A waves present, normal jugular venous V waves present and no jugular venous jj A waves.   Pulmonary: no respiratory distress, normal respiratory rhythm and effort, no accessory muscle use and lungs were clear to auscultation bilaterally.   Cardiovascular: heart rate and rhythm were normal, normal S1 and S2 and no murmur, gallop, rub, heave or thrill are present.   Abdomen: soft, non-tender.  Musculoskeletal: the gait could not be assessed.   Extremities: no clubbing of the fingernails, no localized cyanosis, no petechial hemorrhages and no ischemic changes.   Skin: normal skin color and pigmentation, no rash, no venous stasis, no skin lesions, no skin ulcer and no xanthoma was observed.   Psychiatric: oriented to person, place, and time, the affect was normal, the mood was normal and not feeling anxious.      LABORATORY:                          12.2   19.22 )-----------( 179      ( 2023 08:00 )             40.1         144  |  112<H>  |  57<H>  ----------------------------<  166<H>  4.8   |  15<L>  |  4.56<H>    Ca    8.7      2023 08:00    TPro  7.3  /  Alb  3.3  /  TBili  1.0  /  DBili  x   /  AST  13<L>  /  ALT  19  /  AlkPhos  75  02      CARDIAC MARKERS ( 2023 12:20 )  x     / x     / 145 U/L / x     / x            LIVER FUNCTIONS - ( 2023 19:25 )  Alb: 3.3 g/dL / Pro: 7.3 gm/dL / ALK PHOS: 75 U/L / ALT: 19 U/L / AST: 13 U/L / GGT: x           PT/INR - ( 2023 08:00 )   PT: 14.8 sec;   INR: 1.23 ratio         PTT - ( 2023 08:00 )  PTT:33.9 sec  Urinalysis Basic - ( 2023 22:15 )    Color: Yellow / Appearance: Clear / S.015 / pH: x  Gluc: x / Ketone: Trace  / Bili: Negative / Urobili: Negative mg/dL   Blood: x / Protein: 500 mg/dL / Nitrite: Negative   Leuk Esterase: Negative / RBC: 0-2 /HPF / WBC Negative   Sq Epi: x / Non Sq Epi: Occasional / Bacteria: Occasional      BNPSerum Pro-Brain Natriuretic Peptide: 6136 pg/mL (23 @ 02:45)      IMAGING:  < from: CT Head No Cont (23 @ 22:32) >  IMPRESSION:  No acute intracranial pathology. Encephalomalacia left occipital right   parietal lobes, compatible with sequelae of previous/chronic infarct or   sequelae of previous trauma.    Atrophy and chronic small vessel ischemic changes.    Extensive degenerative changes cervical spine.    No acute cervical spine fracture.    < end of copied text >    < from: CT Abdomen and Pelvis No Cont (23 @ 22:34) >  ACC: [id number] EXAM:  CT ABDOMEN AND PELVIS   ORDERED BY: JOSE JHA     PROCEDURE DATE:  2023          INTERPRETATION:  CLINICAL INFORMATION: Right hip and abdominal pain after   fall. Acute renal failure.    COMPARISON: None.    CONTRAST/COMPLICATIONS:  IV Contrast: None  Oral Contrast: None  Complications: None    PROCEDURE:  CT of the Abdomen and Pelvis was performed.  Sagittal and coronal reformats were performed.    FINDINGS:  LOWER CHEST: Heart is enlarged. There is trace pericardial fluid.   Coronary, valvular and aortic calcifications are appreciated. Bibasilar   atelectatic changes are seen posteriorly. Motion artifact otherwise   limits evaluation for fine pulmonary parenchymal detail..    LIVER: Within normal limits.  BILEDUCTS: Normal caliber.  GALLBLADDER: There are layering stones versus sludge. Calcification   between the left and caudate lobes is likely sequelae of old   granulomatous disease or vascular in nature. The liver is otherwise   unremarkable..  SPLEEN:Within normal limits.  PANCREAS: Within normal limits.  ADRENALS: Nonspecific thickened appearance of the bilateral adrenal   glands, likely related to hyperplasia.  KIDNEYS/URETERS: Left-sided renal cysts. Bilateral renal hilar   calcifications are somewhat linear configuration likely vascular in   nature. Nonobstructing stones not excluded. Extensive bilateral   perinephric stranding is appreciated, symmetric. There is no urinary   tract obstruction.    BLADDER: Wall thickening.  REPRODUCTIVE ORGANS: Prostate gland is enlarged.    BOWEL: No bowel obstruction. Appendix  is normal. There is a small hiatal   hernia. Diffuse wall thickening of the visualized distal esophagus is   noted. There is questionable gastric wall thickening, though the stomach   is also under distended.  PERITONEUM: Trace fluid is seen within the cul-de-sac, abnormal in a male   patient..  VESSELS: Atherosclerotic changes.  RETROPERITONEUM/LYMPH NODES: No lymphadenopathy.  ABDOMINAL WALL: Generalized soft tissue edema..  BONES: There is a displaced right femoral neck fracture. This is of   uncertain age, though is at least subacute or acute nature.    IMPRESSION:  There is a displaced right femoral neck fracture, acute or subacute.    Urinary bladder wall thickening which may be related to sequelae of   chronic outlet obstruction from prostate enlargement. Superimposed   cystitis should be evaluated for clinically, correlation with urinalysis.    Symmetric bilateral perinephric stranding, which may indicate underlying   pyelonephritis/nephritis or nephropathy. Correlate clinically.    Nonspecific trace free fluid seen within the cul-de-sac.    Thickened appearance of the distal esophagus and stomach which may   represent esophagitis/gastritis. Correlate clinically.    Cardiomegaly.    Additional findings as above.    < end of copied text >    ASSESSMENT:      PLAN:       atorvastatin 40 milliGRAM(s) Oral at bedtime  azithromycin  IVPB 500 milliGRAM(s) IV Intermittent every 24 hours  carvedilol 12.5 milliGRAM(s) Oral every 12 hours  cefTRIAXone   IVPB 1000 milliGRAM(s) IV Intermittent every 24 hours  glucagon  Injectable 1 milliGRAM(s) IntraMuscular once  hydrALAZINE 50 milliGRAM(s) Oral three times a day  insulin lispro (ADMELOG) corrective regimen sliding scale   SubCutaneous three times a day before meals    check echo.  Pt is at high risk for poor overall outcome    Theodore Velez MD, FACC, NAIF, HALLIE, FACP  Director, Heart Failure Services  [facility]  , Department of Cardiology  [facility]     CHIEF COMPLAINT:  Patient is a 82y old  Male who presents with a chief complaint of Hip fracture (2023 11:55)      HPI:  82-year-old with hypertension, type 2 diabetes, chronic kidney disease, had a slip and fall onto his right hip found to have right hip fracture and noted to be COVID-19 positive for pneumonia.  Elevated troponin and BNP also noted.  He is seen resting comfortably on telemetry floor no immediate distress slowly is lethargic on pain medication now.  Lonnie Deluca is at the bedside.    ALLERGIES:  No Known Allergies      Home Medications:  carvedilol 12.5 mg oral tablet: 1 tab(s) orally 2 times a day (2023 15:03)  FARXIGA 10 MG TABLET: take 1 tablet by mouth once daily (2023 15:03)  glyBURIDE 5 mg oral tablet: 1 tab(s) orally 4 times a day (2023 15:03)  HYDRALAZINE 50 MG TABLET: take 1 tablet by mouth once daily (2023 15:03)  Januvia 50 mg oral tablet: 1 tab(s) orally once a day (2023 15:03)  LOSARTAN 50MG TAB: 1 tab(s) orally once a day (2023 15:03)  metFORMIN 500 mg oral tablet, extended release: 1 tab(s) orally once a day (2023 15:03)  repaglinide 2 mg oral tablet: 1 tab(s) orally 3 times a day (before meals) (2023 15:03)  SIMVASTATIN 40 MG TABLET: take 1 tablet by mouth once daily (2023 15:03)    PAST MEDICAL & SURGICAL HISTORY:  DM (diabetes mellitus)      CHF (congestive heart failure)        FAMILY HISTORY:  n/a    SOCIAL HISTORY:  no tobacco    REVIEW OF SYSTEMS:  General:  No wt loss, fevers, chills, night sweats  Eyes:  Good vision, no reported pain  ENT:  No sore throat, pain, runny nose, dysphagia  CV:  No pain, palpitations, hypo/hypertension  Resp:  No dyspnea, cough, tachypnea, wheezing  GI:  No pain, nausea, vomiting, diarrhea, constipation  :  No pain, bleeding, incontinence, nocturia  Muscle:  No pain, weakness  Breast:  No pain, abscess, mass, discharge  Neuro:  No weakness, tingling, memory problems  Psych:  No fatigue, insomnia, mood problems, depression  Endocrine:  No polyuria, polydipsia, cold/heat intolerance  Heme:  No petechiae, ecchymosis, easy bruisability  Skin:  No rash, edema    PHYSICAL EXAM:  Vital Signs:  Vital Signs Last 24 Hrs  T(C): 37.1 (2023 11:55), Max: 37.1 (2023 11:55)  T(F): 98.7 (2023 11:55), Max: 98.7 (2023 11:55)  HR: 101 (2023 11:55) (91 - 109)  BP: 178/99 (2023 11:55) (175/100 - 203/109)  BP(mean): 128 (2023 19:19) (128 - 128)  RR: 18 (2023 11:55) (16 - 18)  SpO2: 93% (2023 11:55) (93% - 100%)    Parameters below as of 2023 04:55  Patient On (Oxygen Delivery Method): room air      Tele: ST, PVCs    Constitutional: well developed, normal appearance, well groomed, well nourished, no deformities and no acute distress.   Eyes: the conjunctiva exhibited no abnormalities and the eyelids demonstrated no xanthelasmas.   HEENT: normal oral mucosa, no oral pallor and no oral cyanosis.   Neck: normal jugular venous A waves present, normal jugular venous V waves present and no jugular venous jj A waves.   Pulmonary: no respiratory distress, normal respiratory rhythm and effort, no accessory muscle use and lungs were clear to auscultation bilaterally.   Cardiovascular: heart rate and rhythm were normal, normal S1 and S2 and no murmur, gallop, rub, heave or thrill are present.   Abdomen: soft, non-tender.  Musculoskeletal: the gait could not be assessed.   Extremities: no clubbing of the fingernails, no localized cyanosis, no petechial hemorrhages and no ischemic changes.   Skin: normal skin color and pigmentation, no rash, no venous stasis, no skin lesions, no skin ulcer and no xanthoma was observed.       LABORATORY:                          12.2   19.22 )-----------( 179      ( 2023 08:00 )             40.1         144  |  112<H>  |  57<H>  ----------------------------<  166<H>  4.8   |  15<L>  |  4.56<H>    Ca    8.7      2023 08:00    TPro  7.3  /  Alb  3.3  /  TBili  1.0  /  DBili  x   /  AST  13<L>  /  ALT  19  /  AlkPhos  75  02-16      CARDIAC MARKERS ( 2023 12:20 )  x     / x     / 145 U/L / x     / x            LIVER FUNCTIONS - ( 2023 19:25 )  Alb: 3.3 g/dL / Pro: 7.3 gm/dL / ALK PHOS: 75 U/L / ALT: 19 U/L / AST: 13 U/L / GGT: x           PT/INR - ( 2023 08:00 )   PT: 14.8 sec;   INR: 1.23 ratio         PTT - ( 2023 08:00 )  PTT:33.9 sec  Urinalysis Basic - ( 2023 22:15 )    Color: Yellow / Appearance: Clear / S.015 / pH: x  Gluc: x / Ketone: Trace  / Bili: Negative / Urobili: Negative mg/dL   Blood: x / Protein: 500 mg/dL / Nitrite: Negative   Leuk Esterase: Negative / RBC: 0-2 /HPF / WBC Negative   Sq Epi: x / Non Sq Epi: Occasional / Bacteria: Occasional      BNPSerum Pro-Brain Natriuretic Peptide: 6136 pg/mL (23 @ 02:45)      IMAGING:  < from: CT Head No Cont (23 @ 22:32) >  IMPRESSION:  No acute intracranial pathology. Encephalomalacia left occipital right   parietal lobes, compatible with sequelae of previous/chronic infarct or   sequelae of previous trauma.    Atrophy and chronic small vessel ischemic changes.    Extensive degenerative changes cervical spine.    No acute cervical spine fracture.    < end of copied text >    < from: CT Abdomen and Pelvis No Cont (23 @ 22:34) >  ACC: [id number] EXAM:  CT ABDOMEN AND PELVIS   ORDERED BY: JOSE ARNOLD DATE:  2023          INTERPRETATION:  CLINICAL INFORMATION: Right hip and abdominal pain after   fall. Acute renal failure.    COMPARISON: None.    CONTRAST/COMPLICATIONS:  IV Contrast: None  Oral Contrast: None  Complications: None    PROCEDURE:  CT of the Abdomen and Pelvis was performed.  Sagittal and coronal reformats were performed.    FINDINGS:  LOWER CHEST: Heart is enlarged. There is trace pericardial fluid.   Coronary, valvular and aortic calcifications are appreciated. Bibasilar   atelectatic changes are seen posteriorly. Motion artifact otherwise   limits evaluation for fine pulmonary parenchymal detail..    LIVER: Within normal limits.  BILEDUCTS: Normal caliber.  GALLBLADDER: There are layering stones versus sludge. Calcification   between the left and caudate lobes is likely sequelae of old   granulomatous disease or vascular in nature. The liver is otherwise   unremarkable..  SPLEEN:Within normal limits.  PANCREAS: Within normal limits.  ADRENALS: Nonspecific thickened appearance of the bilateral adrenal   glands, likely related to hyperplasia.  KIDNEYS/URETERS: Left-sided renal cysts. Bilateral renal hilar   calcifications are somewhat linear configuration likely vascular in   nature. Nonobstructing stones not excluded. Extensive bilateral   perinephric stranding is appreciated, symmetric. There is no urinary   tract obstruction.    BLADDER: Wall thickening.  REPRODUCTIVE ORGANS: Prostate gland is enlarged.    BOWEL: No bowel obstruction. Appendix  is normal. There is a small hiatal   hernia. Diffuse wall thickening of the visualized distal esophagus is   noted. There is questionable gastric wall thickening, though the stomach   is also under distended.  PERITONEUM: Trace fluid is seen within the cul-de-sac, abnormal in a male   patient..  VESSELS: Atherosclerotic changes.  RETROPERITONEUM/LYMPH NODES: No lymphadenopathy.  ABDOMINAL WALL: Generalized soft tissue edema..  BONES: There is a displaced right femoral neck fracture. This is of   uncertain age, though is at least subacute or acute nature.    IMPRESSION:  There is a displaced right femoral neck fracture, acute or subacute.    Urinary bladder wall thickening which may be related to sequelae of   chronic outlet obstruction from prostate enlargement. Superimposed   cystitis should be evaluated for clinically, correlation with urinalysis.    Symmetric bilateral perinephric stranding, which may indicate underlying   pyelonephritis/nephritis or nephropathy. Correlate clinically.    Nonspecific trace free fluid seen within the cul-de-sac.    Thickened appearance of the distal esophagus and stomach which may   represent esophagitis/gastritis. Correlate clinically.    Cardiomegaly.    Additional findings as above.    < end of copied text >    ASSESSMENT:  82-year-old with hypertension, type 2 diabetes, chronic kidney disease, had a slip and fall onto his right hip found to have right hip fracture and noted to be COVID-19 positive for pneumonia.  Elevated troponin and BNP also noted.  He is seen resting comfortably on telemetry floor no immediate distress slowly is lethargic on pain medication now.  Lonnie Deluca is at the bedside.    PLAN:       Continue atorvastatin for lipid lowering.  Maintain carvedilol and hydralazine for blood pressure management.  Continue insulin for sliding scale coverage of diabetes and ceftriaxone for antibiotic coverage.  Check echo to assess LV function and valvular status.  Currently based on significant comorbid conditions and high risk clinical predictors, the patient is at high cardiovascular risk for a poor outcome.  He is scheduled for hemiarthroplasty of the right hip.  Echo is pending.    Theodore Velez MD, FACC, NAIF, HALLIE, FACP  Director, Heart Failure Services  [facility]  , Department of Cardiology  [facility]     CHIEF COMPLAINT:  Patient is a 82y old  Male who presents with a chief complaint of Hip fracture (2023 11:55)      HPI:  82-year-old with hypertension, type 2 diabetes, chronic kidney disease, had a slip and fall onto his right hip found to have right hip fracture and noted to be COVID-19 positive for pneumonia.  Elevated troponin and BNP also noted.  He is seen resting comfortably on telemetry floor no immediate distress slowly is lethargic on pain medication now.  Lonnie Deluca is at the bedside.    ALLERGIES:  No Known Allergies      Home Medications:  carvedilol 12.5 mg oral tablet: 1 tab(s) orally 2 times a day (2023 15:03)  FARXIGA 10 MG TABLET: take 1 tablet by mouth once daily (2023 15:03)  glyBURIDE 5 mg oral tablet: 1 tab(s) orally 4 times a day (2023 15:03)  HYDRALAZINE 50 MG TABLET: take 1 tablet by mouth once daily (2023 15:03)  Januvia 50 mg oral tablet: 1 tab(s) orally once a day (2023 15:03)  LOSARTAN 50MG TAB: 1 tab(s) orally once a day (2023 15:03)  metFORMIN 500 mg oral tablet, extended release: 1 tab(s) orally once a day (2023 15:03)  repaglinide 2 mg oral tablet: 1 tab(s) orally 3 times a day (before meals) (2023 15:03)  SIMVASTATIN 40 MG TABLET: take 1 tablet by mouth once daily (2023 15:03)    PAST MEDICAL & SURGICAL HISTORY:  DM (diabetes mellitus)      CHF (congestive heart failure)        FAMILY HISTORY:  n/a    SOCIAL HISTORY:  no tobacco    REVIEW OF SYSTEMS:  General:  No wt loss, fevers, chills, night sweats  Eyes:  Good vision, no reported pain  ENT:  No sore throat, pain, runny nose, dysphagia  CV:  No pain, palpitations, hypo/hypertension  Resp:  No dyspnea, cough, tachypnea, wheezing  GI:  No pain, nausea, vomiting, diarrhea, constipation  :  No pain, bleeding, incontinence, nocturia  Muscle:  No pain, weakness  Breast:  No pain, abscess, mass, discharge  Neuro:  No weakness, tingling, memory problems  Psych:  No fatigue, insomnia, mood problems, depression  Endocrine:  No polyuria, polydipsia, cold/heat intolerance  Heme:  No petechiae, ecchymosis, easy bruisability  Skin:  No rash, edema    PHYSICAL EXAM:  Vital Signs:  Vital Signs Last 24 Hrs  T(C): 37.1 (2023 11:55), Max: 37.1 (2023 11:55)  T(F): 98.7 (2023 11:55), Max: 98.7 (2023 11:55)  HR: 101 (2023 11:55) (91 - 109)  BP: 178/99 (2023 11:55) (175/100 - 203/109)  BP(mean): 128 (2023 19:19) (128 - 128)  RR: 18 (2023 11:55) (16 - 18)  SpO2: 93% (2023 11:55) (93% - 100%)    Parameters below as of 2023 04:55  Patient On (Oxygen Delivery Method): room air      Tele: ST, PVCs    Constitutional: well developed, normal appearance, well groomed, well nourished, no deformities and no acute distress.   Eyes: the conjunctiva exhibited no abnormalities and the eyelids demonstrated no xanthelasmas.   HEENT: normal oral mucosa, no oral pallor and no oral cyanosis.   Neck: normal jugular venous A waves present, normal jugular venous V waves present and no jugular venous jj A waves.   Pulmonary: no respiratory distress, normal respiratory rhythm and effort, no accessory muscle use and lungs were clear to auscultation bilaterally.   Cardiovascular: heart rate and rhythm were normal, normal S1 and S2 and no murmur, gallop, rub, heave or thrill are present.   Abdomen: soft, non-tender.  Musculoskeletal: the gait could not be assessed.   Extremities: no clubbing of the fingernails, no localized cyanosis, no petechial hemorrhages and no ischemic changes.   Skin: normal skin color and pigmentation, no rash, no venous stasis, no skin lesions, no skin ulcer and no xanthoma was observed.       LABORATORY:                          12.2   19.22 )-----------( 179      ( 2023 08:00 )             40.1         144  |  112<H>  |  57<H>  ----------------------------<  166<H>  4.8   |  15<L>  |  4.56<H>    Ca    8.7      2023 08:00    TPro  7.3  /  Alb  3.3  /  TBili  1.0  /  DBili  x   /  AST  13<L>  /  ALT  19  /  AlkPhos  75  02-16      CARDIAC MARKERS ( 2023 12:20 )  x     / x     / 145 U/L / x     / x            LIVER FUNCTIONS - ( 2023 19:25 )  Alb: 3.3 g/dL / Pro: 7.3 gm/dL / ALK PHOS: 75 U/L / ALT: 19 U/L / AST: 13 U/L / GGT: x           PT/INR - ( 2023 08:00 )   PT: 14.8 sec;   INR: 1.23 ratio         PTT - ( 2023 08:00 )  PTT:33.9 sec  Urinalysis Basic - ( 2023 22:15 )    Color: Yellow / Appearance: Clear / S.015 / pH: x  Gluc: x / Ketone: Trace  / Bili: Negative / Urobili: Negative mg/dL   Blood: x / Protein: 500 mg/dL / Nitrite: Negative   Leuk Esterase: Negative / RBC: 0-2 /HPF / WBC Negative   Sq Epi: x / Non Sq Epi: Occasional / Bacteria: Occasional      BNPSerum Pro-Brain Natriuretic Peptide: 6136 pg/mL (23 @ 02:45)      IMAGING:  < from: CT Head No Cont (23 @ 22:32) >  IMPRESSION:  No acute intracranial pathology. Encephalomalacia left occipital right   parietal lobes, compatible with sequelae of previous/chronic infarct or   sequelae of previous trauma.    Atrophy and chronic small vessel ischemic changes.    Extensive degenerative changes cervical spine.    No acute cervical spine fracture.    < end of copied text >    < from: CT Abdomen and Pelvis No Cont (23 @ 22:34) >  ACC: [id number] EXAM:  CT ABDOMEN AND PELVIS   ORDERED BY: JOSE ARNOLD DATE:  2023          INTERPRETATION:  CLINICAL INFORMATION: Right hip and abdominal pain after   fall. Acute renal failure.    COMPARISON: None.    CONTRAST/COMPLICATIONS:  IV Contrast: None  Oral Contrast: None  Complications: None    PROCEDURE:  CT of the Abdomen and Pelvis was performed.  Sagittal and coronal reformats were performed.    FINDINGS:  LOWER CHEST: Heart is enlarged. There is trace pericardial fluid.   Coronary, valvular and aortic calcifications are appreciated. Bibasilar   atelectatic changes are seen posteriorly. Motion artifact otherwise   limits evaluation for fine pulmonary parenchymal detail..    LIVER: Within normal limits.  BILEDUCTS: Normal caliber.  GALLBLADDER: There are layering stones versus sludge. Calcification   between the left and caudate lobes is likely sequelae of old   granulomatous disease or vascular in nature. The liver is otherwise   unremarkable..  SPLEEN:Within normal limits.  PANCREAS: Within normal limits.  ADRENALS: Nonspecific thickened appearance of the bilateral adrenal   glands, likely related to hyperplasia.  KIDNEYS/URETERS: Left-sided renal cysts. Bilateral renal hilar   calcifications are somewhat linear configuration likely vascular in   nature. Nonobstructing stones not excluded. Extensive bilateral   perinephric stranding is appreciated, symmetric. There is no urinary   tract obstruction.    BLADDER: Wall thickening.  REPRODUCTIVE ORGANS: Prostate gland is enlarged.    BOWEL: No bowel obstruction. Appendix  is normal. There is a small hiatal   hernia. Diffuse wall thickening of the visualized distal esophagus is   noted. There is questionable gastric wall thickening, though the stomach   is also under distended.  PERITONEUM: Trace fluid is seen within the cul-de-sac, abnormal in a male   patient..  VESSELS: Atherosclerotic changes.  RETROPERITONEUM/LYMPH NODES: No lymphadenopathy.  ABDOMINAL WALL: Generalized soft tissue edema..  BONES: There is a displaced right femoral neck fracture. This is of   uncertain age, though is at least subacute or acute nature.    IMPRESSION:  There is a displaced right femoral neck fracture, acute or subacute.    Urinary bladder wall thickening which may be related to sequelae of   chronic outlet obstruction from prostate enlargement. Superimposed   cystitis should be evaluated for clinically, correlation with urinalysis.    Symmetric bilateral perinephric stranding, which may indicate underlying   pyelonephritis/nephritis or nephropathy. Correlate clinically.    Nonspecific trace free fluid seen within the cul-de-sac.    Thickened appearance of the distal esophagus and stomach which may   represent esophagitis/gastritis. Correlate clinically.    Cardiomegaly.    Additional findings as above.    < end of copied text >    ASSESSMENT:  82-year-old with hypertension, type 2 diabetes, chronic kidney disease, had a slip and fall onto his right hip found to have right hip fracture and noted to be COVID-19 positive for pneumonia.  Elevated troponin and BNP also noted.  He is seen resting comfortably on telemetry floor no immediate distress slowly is lethargic on pain medication now.  Son Yosi is at the bedside.    PLAN:       Continue atorvastatin for lipid lowering.  Maintain carvedilol and hydralazine for blood pressure management.  Continue insulin for sliding scale coverage of diabetes and ceftriaxone for antibiotic coverage.  Check echo to assess LV function and valvular status.  Currently based on significant comorbid conditions and high risk clinical predictors, the patient is at high cardiovascular risk for a poor outcome.  He is scheduled for hemiarthroplasty of the right hip.  Echo is pending. Discussed with patient's son at bedside who understands that his father is at high risk for complications given his significant comorbid conditions.    Theodore Velez MD, FACC, HALLIE DISLA, FACP  Director, Heart Failure Services  [facility]  , Department of Cardiology  [facility]

## 2023-02-18 ENCOUNTER — TRANSCRIPTION ENCOUNTER (OUTPATIENT)
Age: 83
End: 2023-02-18

## 2023-02-18 LAB
A1C WITH ESTIMATED AVERAGE GLUCOSE RESULT: 6.8 % — HIGH (ref 4–5.6)
ANION GAP SERPL CALC-SCNC: 10 MMOL/L — SIGNIFICANT CHANGE UP (ref 5–17)
APTT BLD: 32.4 SEC — SIGNIFICANT CHANGE UP (ref 27.5–35.5)
BUN SERPL-MCNC: 67 MG/DL — HIGH (ref 7–23)
CALCIUM SERPL-MCNC: 8.6 MG/DL — SIGNIFICANT CHANGE UP (ref 8.5–10.1)
CHLORIDE SERPL-SCNC: 116 MMOL/L — HIGH (ref 96–108)
CO2 SERPL-SCNC: 19 MMOL/L — LOW (ref 22–31)
CREAT SERPL-MCNC: 4.46 MG/DL — HIGH (ref 0.5–1.3)
CULTURE RESULTS: SIGNIFICANT CHANGE UP
EGFR: 13 ML/MIN/1.73M2 — LOW
ESTIMATED AVERAGE GLUCOSE: 148 MG/DL — HIGH (ref 68–114)
GLUCOSE BLDC GLUCOMTR-MCNC: 205 MG/DL — HIGH (ref 70–99)
GLUCOSE BLDC GLUCOMTR-MCNC: 245 MG/DL — HIGH (ref 70–99)
GLUCOSE BLDC GLUCOMTR-MCNC: 250 MG/DL — HIGH (ref 70–99)
GLUCOSE BLDC GLUCOMTR-MCNC: 302 MG/DL — HIGH (ref 70–99)
GLUCOSE SERPL-MCNC: 243 MG/DL — HIGH (ref 70–99)
HCT VFR BLD CALC: 38.6 % — LOW (ref 39–50)
HGB BLD-MCNC: 11.9 G/DL — LOW (ref 13–17)
INR BLD: 1.25 RATIO — HIGH (ref 0.88–1.16)
LEGIONELLA AG UR QL: NEGATIVE — SIGNIFICANT CHANGE UP
MAGNESIUM SERPL-MCNC: 2.4 MG/DL — SIGNIFICANT CHANGE UP (ref 1.6–2.6)
MCHC RBC-ENTMCNC: 26.8 PG — LOW (ref 27–34)
MCHC RBC-ENTMCNC: 30.8 G/DL — LOW (ref 32–36)
MCV RBC AUTO: 86.9 FL — SIGNIFICANT CHANGE UP (ref 80–100)
NRBC # BLD: 0 /100 WBCS — SIGNIFICANT CHANGE UP (ref 0–0)
PHOSPHATE SERPL-MCNC: 4.2 MG/DL — SIGNIFICANT CHANGE UP (ref 2.5–4.5)
PLATELET # BLD AUTO: 169 K/UL — SIGNIFICANT CHANGE UP (ref 150–400)
POTASSIUM SERPL-MCNC: 4.8 MMOL/L — SIGNIFICANT CHANGE UP (ref 3.5–5.3)
POTASSIUM SERPL-SCNC: 4.8 MMOL/L — SIGNIFICANT CHANGE UP (ref 3.5–5.3)
PROCALCITONIN SERPL-MCNC: 0.77 NG/ML — HIGH (ref 0.02–0.1)
PROTHROM AB SERPL-ACNC: 14.9 SEC — HIGH (ref 10.5–13.4)
RBC # BLD: 4.44 M/UL — SIGNIFICANT CHANGE UP (ref 4.2–5.8)
RBC # FLD: 14.6 % — HIGH (ref 10.3–14.5)
SODIUM SERPL-SCNC: 145 MMOL/L — SIGNIFICANT CHANGE UP (ref 135–145)
SPECIMEN SOURCE: SIGNIFICANT CHANGE UP
TROPONIN I, HIGH SENSITIVITY RESULT: 195.8 NG/L — HIGH
WBC # BLD: 17.7 K/UL — HIGH (ref 3.8–10.5)
WBC # FLD AUTO: 17.7 K/UL — HIGH (ref 3.8–10.5)

## 2023-02-18 PROCEDURE — 99232 SBSQ HOSP IP/OBS MODERATE 35: CPT

## 2023-02-18 RX ORDER — CEFTRIAXONE 500 MG/1
0 INJECTION, POWDER, FOR SOLUTION INTRAMUSCULAR; INTRAVENOUS
Qty: 0 | Refills: 0 | DISCHARGE
Start: 2023-02-18

## 2023-02-18 RX ORDER — METFORMIN HYDROCHLORIDE 850 MG/1
1 TABLET ORAL
Qty: 0 | Refills: 0 | DISCHARGE

## 2023-02-18 RX ORDER — DAPAGLIFLOZIN 10 MG/1
0 TABLET, FILM COATED ORAL
Qty: 0 | Refills: 0 | DISCHARGE

## 2023-02-18 RX ORDER — AZITHROMYCIN 500 MG/1
500 TABLET, FILM COATED ORAL
Qty: 0 | Refills: 0 | DISCHARGE
Start: 2023-02-18

## 2023-02-18 RX ORDER — SITAGLIPTIN 50 MG/1
1 TABLET, FILM COATED ORAL
Qty: 0 | Refills: 0 | DISCHARGE

## 2023-02-18 RX ORDER — INSULIN LISPRO 100/ML
8 VIAL (ML) SUBCUTANEOUS ONCE
Refills: 0 | Status: COMPLETED | OUTPATIENT
Start: 2023-02-18 | End: 2023-02-18

## 2023-02-18 RX ORDER — HYDRALAZINE HCL 50 MG
0 TABLET ORAL
Qty: 0 | Refills: 2 | DISCHARGE

## 2023-02-18 RX ORDER — REPAGLINIDE 1 MG/1
1 TABLET ORAL
Qty: 0 | Refills: 0 | DISCHARGE

## 2023-02-18 RX ORDER — INSULIN LISPRO 100/ML
0 VIAL (ML) SUBCUTANEOUS
Qty: 0 | Refills: 0 | DISCHARGE
Start: 2023-02-18

## 2023-02-18 RX ORDER — CARVEDILOL PHOSPHATE 80 MG/1
25 CAPSULE, EXTENDED RELEASE ORAL EVERY 12 HOURS
Refills: 0 | Status: DISCONTINUED | OUTPATIENT
Start: 2023-02-18 | End: 2023-02-19

## 2023-02-18 RX ORDER — GLYBURIDE 5 MG
1 TABLET ORAL
Qty: 0 | Refills: 0 | DISCHARGE

## 2023-02-18 RX ADMIN — CEFTRIAXONE 100 MILLIGRAM(S): 500 INJECTION, POWDER, FOR SOLUTION INTRAMUSCULAR; INTRAVENOUS at 03:06

## 2023-02-18 RX ADMIN — CARVEDILOL PHOSPHATE 25 MILLIGRAM(S): 80 CAPSULE, EXTENDED RELEASE ORAL at 21:58

## 2023-02-18 RX ADMIN — CARVEDILOL PHOSPHATE 12.5 MILLIGRAM(S): 80 CAPSULE, EXTENDED RELEASE ORAL at 05:19

## 2023-02-18 RX ADMIN — AZITHROMYCIN 255 MILLIGRAM(S): 500 TABLET, FILM COATED ORAL at 03:07

## 2023-02-18 RX ADMIN — HEPARIN SODIUM 5000 UNIT(S): 5000 INJECTION INTRAVENOUS; SUBCUTANEOUS at 00:19

## 2023-02-18 RX ADMIN — Medication 50 MILLIGRAM(S): at 05:19

## 2023-02-18 RX ADMIN — Medication 50 MILLIGRAM(S): at 21:57

## 2023-02-18 RX ADMIN — ATORVASTATIN CALCIUM 40 MILLIGRAM(S): 80 TABLET, FILM COATED ORAL at 21:57

## 2023-02-18 NOTE — DISCHARGE NOTE PROVIDER - NSDCCPCAREPLAN_GEN_ALL_CORE_FT
PRINCIPAL DISCHARGE DIAGNOSIS  Diagnosis: Femoral neck fracture  Assessment and Plan of Treatment:       SECONDARY DISCHARGE DIAGNOSES  Diagnosis: Acute renal failure  Assessment and Plan of Treatment:     Diagnosis: Pneumonia  Assessment and Plan of Treatment:

## 2023-02-18 NOTE — PROGRESS NOTE ADULT - SUBJECTIVE AND OBJECTIVE BOX
INTERVAL HPI/OVERNIGHT EVENTS:  Pt seen and examined at bedside.     Allergies/Intolerance: No Known Allergies      MEDICATIONS  (STANDING):  atorvastatin 40 milliGRAM(s) Oral at bedtime  azithromycin  IVPB 500 milliGRAM(s) IV Intermittent every 24 hours  carvedilol 12.5 milliGRAM(s) Oral every 12 hours  cefTRIAXone   IVPB 1000 milliGRAM(s) IV Intermittent every 24 hours  dextrose 5%. 1000 milliLiter(s) (100 mL/Hr) IV Continuous <Continuous>  dextrose 5%. 1000 milliLiter(s) (50 mL/Hr) IV Continuous <Continuous>  dextrose 50% Injectable 25 Gram(s) IV Push once  dextrose 50% Injectable 12.5 Gram(s) IV Push once  dextrose 50% Injectable 25 Gram(s) IV Push once  glucagon  Injectable 1 milliGRAM(s) IntraMuscular once  hydrALAZINE 50 milliGRAM(s) Oral three times a day  insulin lispro (ADMELOG) corrective regimen sliding scale   SubCutaneous three times a day before meals    MEDICATIONS  (PRN):  dextrose Oral Gel 15 Gram(s) Oral once PRN Blood Glucose LESS THAN 70 milliGRAM(s)/deciliter        ROS: all systems reviewed and wnl      PHYSICAL EXAMINATION:  Vital Signs Last 24 Hrs  T(C): 36.8 (2023 10:54), Max: 37.2 (2023 21:33)  T(F): 98.3 (2023 10:54), Max: 98.9 (2023 21:33)  HR: 100 (2023 10:54) (100 - 108)  BP: 156/84 (2023 10:54) (156/84 - 168/94)  BP(mean): --  RR: 17 (2023 10:54) (17 - 18)  SpO2: 98% (2023 10:54) (90% - 98%)    Parameters below as of 2023 10:54  Patient On (Oxygen Delivery Method): room air      CAPILLARY BLOOD GLUCOSE      POCT Blood Glucose.: 205 mg/dL (2023 11:26)  POCT Blood Glucose.: 245 mg/dL (2023 07:56)  POCT Blood Glucose.: 343 mg/dL (2023 21:55)       @ 07:01  -  02-18 @ 07:00  --------------------------------------------------------  IN: 240 mL / OUT: 1200 mL / NET: -960 mL        GENERAL:   NECK: supple, No JVD  CHEST/LUNG: clear to auscultation bilaterally; no rales, rhonchi, or wheezing b/l  HEART: normal S1, S2  ABDOMEN: BS+, soft, ND, NT   EXTREMITIES:  pulses palpable; no clubbing, cyanosis, or edema b/l LEs  SKIN: no rashes or lesions      LABS:                        11.9   17.70 )-----------( 169      ( 2023 07:05 )             38.6     02-18    145  |  116<H>  |  67<H>  ----------------------------<  243<H>  4.8   |  19<L>  |  4.46<H>    Ca    8.6      2023 07:05  Phos  4.2     02-18  Mg     2.4     02-18    TPro  7.3  /  Alb  3.3  /  TBili  1.0  /  DBili  x   /  AST  13<L>  /  ALT  19  /  AlkPhos  75  02-16    PT/INR - ( 2023 07:05 )   PT: 14.9 sec;   INR: 1.25 ratio         PTT - ( 2023 07:05 )  PTT:32.4 sec  Urinalysis Basic - ( 2023 22:15 )    Color: Yellow / Appearance: Clear / S.015 / pH: x  Gluc: x / Ketone: Trace  / Bili: Negative / Urobili: Negative mg/dL   Blood: x / Protein: 500 mg/dL / Nitrite: Negative   Leuk Esterase: Negative / RBC: 0-2 /HPF / WBC Negative   Sq Epi: x / Non Sq Epi: Occasional / Bacteria: Occasional            
Orthopaedic Surgery: Progress Note    Patient interviewed and examined at bedside, resting comfortably. Confused, but able to participate in a question-directed interview and prompt-based physical examination.  Pain well-controlled. Denies any numbness or tingling in the Right lower extremity. Denies fevers, chills, headaches, chest pain, or shortness of breath.       VITAL SIGNS  T(C): 36.8 (02-18-23 @ 05:20), Max: 37.2 (02-17-23 @ 21:33)  HR: 100 (02-18-23 @ 05:20) (100 - 108)  BP: 168/94 (02-18-23 @ 05:20) (159/89 - 178/99)  RR: 18 (02-18-23 @ 05:20) (18 - 18)  SpO2: 95% (02-18-23 @ 05:20) (90% - 96%)    PHYSICAL EXAM  GEN: NAD    RLE:  Skin intact.   +TA/EHL/FHL/GSC.   L2-S1 SILT.   + DP pulses.   Compartments soft and compressible.   Calf nontender.   SCDs in place.       ASSESSMENT:  82y Male w/ R Femoral Neck Fracture    PLAN:  - Plan for OR today (2/18) for Right Hip Hemiarthroplasty.   - Please document Medical/Cardiac/Other clearance(s)/optimization as needed.   - Bedrest.   - NPO except medications.   - IVF while NPO.   - Hold chemical DVT in the preoperative setting.   - Will discuss with Dr. Mckeon and advise of any changes to the above plan.

## 2023-02-18 NOTE — DISCHARGE NOTE PROVIDER - HOSPITAL COURSE
HPI:  81 yo male w/ pmhx of CKD, DM, HTN presented to the ED [facility] 2/2 due to fall. History obtained by Chart review as well as speaking to the son jay. The patient was at a gas station ambulating, slipped and fell landed on this right hip. Upon arrival to the ED, patient was noted to have COVID-19 PNA, as well as right hip fracture. Noted to have elevated BNP, trop x1 elevated. CXR performed showed evidence of Right upper lobe opacities.     Right hip femoral fracture;  2/2 due to fall  - consent obtained by ortho with son to procedure  - recommend Cardiology consult, given  elevated troponin, elevated BNP  - intermediate risk surgery  - Given patient age and as well as comorbidities, patient is high risk for postoperative complication.  - revised cardiac risk 2 points, 10.1 % 30 days risk of death, MI or cardiac arrest     Discussed with Cardiology attending. Will transfer to [facility] later today for right hip surgery.   Patient is high risk. Cardio and Ortho agree with transfer.      Subjective/Observations: Pt. seen and examined and evaluated. Pt. resting comfortably in bed in NAD, with no respiratory distress, no chest pain, dyspnea, palpitations, PND, or orthopnea.    REVIEW OF SYSTEMS: All other review of systems is negative unless indicated above    MEDICATIONS  (STANDING):  atorvastatin 40 milliGRAM(s) Oral at bedtime  azithromycin  IVPB 500 milliGRAM(s) IV Intermittent every 24 hours  carvedilol 12.5 milliGRAM(s) Oral every 12 hours  cefTRIAXone   IVPB 1000 milliGRAM(s) IV Intermittent every 24 hours  dextrose 5%. 1000 milliLiter(s) (50 mL/Hr) IV Continuous <Continuous>  dextrose 5%. 1000 milliLiter(s) (100 mL/Hr) IV Continuous <Continuous>  dextrose 50% Injectable 25 Gram(s) IV Push once  dextrose 50% Injectable 12.5 Gram(s) IV Push once  dextrose 50% Injectable 25 Gram(s) IV Push once  glucagon  Injectable 1 milliGRAM(s) IntraMuscular once  hydrALAZINE 50 milliGRAM(s) Oral three times a day  insulin lispro (ADMELOG) corrective regimen sliding scale   SubCutaneous three times a day before meals    MEDICATIONS  (PRN):  dextrose Oral Gel 15 Gram(s) Oral once PRN Blood Glucose LESS THAN 70 milliGRAM(s)/deciliter      Allergies: No Known Allergies    Vital Signs Last 24 Hrs  T(C): 36.8 (18 Feb 2023 18:01), Max: 37.2 (17 Feb 2023 21:33)  T(F): 98.3 (18 Feb 2023 18:01), Max: 98.9 (17 Feb 2023 21:33)  HR: 107 (18 Feb 2023 18:01) (100 - 107)  BP: 183/84 (18 Feb 2023 18:01) (156/84 - 183/84)  BP(mean): --  RR: 18 (18 Feb 2023 18:01) (17 - 18)  SpO2: 100% (18 Feb 2023 18:01) (95% - 100%)    Parameters below as of 18 Feb 2023 10:54  Patient On (Oxygen Delivery Method): room air        < from: Xray Hip w/ Pelvis 2 or 3 Views, Right (02.16.23 @ 21:29) >  IMPRESSION:  Minimally displaced femoral neck fracture    < from: Xray Chest 1 View- PORTABLE-Urgent (Xray Chest 1 View- PORTABLE-Urgent .) (02.16.23 @ 21:31) >  Bones: There is no fracture. Degenerative changes of the spine.  Lines and catheter: None    Impression:  Airspace disease right upper lobe and question right upper lobe nodule.  Rule out pneumonia.    Follow-up chest CT recommended.  < from: CT Abdomen and Pelvis No Cont (02.16.23 @ 22:34) >      IMPRESSION:  There is a displaced right femoral neck fracture, acute or subacute.    Urinary bladder wall thickening which may be related to sequelae of   chronic outlet obstruction from prostate enlargement. Superimposed   cystitis should be evaluated for clinically, correlation with urinalysis.    Symmetric bilateral perinephric stranding, which may indicate underlying   pyelonephritis/nephritis or nephropathy. Correlate clinically.    Nonspecific trace free fluid seen within the cul-de-sac.    Thickened appearance of the distal esophagus and stomach which may   represent esophagitis/gastritis. Correlate clinically.    Cardiomegaly.

## 2023-02-18 NOTE — PROVIDER CONTACT NOTE (CHANGE IN STATUS NOTIFICATION) - SITUATION
Patient's blood glucose 343, patient ate dinner earlier as per previous nurse but now NPO, possible surgery for right femoral fracture,

## 2023-02-18 NOTE — PROGRESS NOTE ADULT - ASSESSMENT
83 yo male w/ pmhx of CKD, DM, HTN presented to the ED [facility] 2/2 due to fall.        Right hip femoral fracture  - 2/2 due to fall  - consent obtained by ortho with son to procedure  - recommend Cardiology consult, given  elevated troponin, elevated BNP  - intermediate risk surgery  - Given patient age and as well as comorbidities, patient is high risk for postoperative complication.  - revised cardiac risk 2 points, 10.1 % 30 days risk of death, MI or cardiac arrest     Pneumonia  - noted on imaging  - c/w Rocephin as well as azithromycin     COVID- 19   - No evidence of Hypoxia  - Continue to trend inflammatory markers      Delirium  - multifactorial in the acute setting from Hip fracture as well as PNA  - frequent orientation to time and place  - UA not suggestive for UTI      Elevated Troponin  - continue to trend  - unable to assess if any chest pain, patient is currently confused  - TTE pending    CHF  - no evidence of fluid overload at this time  - elevated BNP      DM  -ISS    Essential HTN  - resume home medication with holding parameters      Preventive measures  - DVT- SCD- as patient one medically optimize will being going to the OR  81 yo male w/ pmhx of CKD, DM, HTN presented to the ED [facility]IB[facility] 2/2 due to fall.        Right hip femoral fracture  - 2/2 due to fall  - consent obtained by ortho with son to procedure  - recommend Cardiology consult, given  elevated troponin, elevated BNP  - intermediate risk surgery  - Given patient age and as well as comorbidities, patient is high risk for postoperative complication.  - revised cardiac risk 2 points, 10.1 % 30 days risk of death, MI or cardiac arrest     Pneumonia: c/w Rocephin as well as azithromycin     COVID- 19 : No evidence of Hypoxia, no treatment needed.        Delirium: multifactorial in the acute setting from Hip fracture as well as PNA  - frequent orientation to time and place  - UA not suggestive for UTI      Elevated Troponin:  continue to trend, unable to assess if any chest pain, patient is currently confused  - TTE pending notes decreased EF 45-50 %.     CHF: no evidence of fluid overload at this time  - elevated BNP      DM: ISS    Essential HTN ; resume home medication with holding parameters     81 yo male w/ pmhx of CKD, DM, HTN presented to the ED BIBEMS 2/2 due to fall.      Right hip femoral fracture;  2/2 due to fall  - consent obtained by ortho with son to procedure  - recommend Cardiology consult, given  elevated troponin, elevated BNP  - intermediate risk surgery  - Given patient age and as well as comorbidities, patient is high risk for postoperative complication.  - revised cardiac risk 2 points, 10.1 % 30 days risk of death, MI or cardiac arrest     Pneumonia: c/w Rocephin as well as azithromycin     COVID- 19 : No evidence of Hypoxia, no treatment needed.        Delirium: multifactorial in the acute setting from Hip fracture as well as PNA  - frequent orientation to time and place  - UA not suggestive for UTI      Elevated Troponin:  continue to trend, unable to assess if any chest pain, patient is currently confused  - TTE pending notes decreased EF 45-50 %.     CHF: no evidence of fluid overload at this time  - elevated BNP      DM: ISS    Essential HTN ; resume home medication with holding parameters     81 yo male w/ pmhx of CKD, DM, HTN presented to the ED BIBEMS 2/2 due to fall.      Right hip femoral fracture;  2/2 due to fall  - consent obtained by ortho with son to procedure  - recommend Cardiology consult, given  elevated troponin, elevated BNP  - intermediate risk surgery  - Given patient age and as well as comorbidities, patient is high risk for postoperative complication.  - revised cardiac risk 2 points, 10.1 % 30 days risk of death, MI or cardiac arrest     Pneumonia: c/w Rocephin as well as azithromycin     COVID- 19 : No evidence of Hypoxia, no treatment needed.        Delirium: multifactorial in the acute setting from Hip fracture as well as PNA  - frequent orientation to time and place  - UA not suggestive for UTI      Elevated Troponin:  continue to trend, unable to assess if any chest pain, patient is currently confused  - TTE pending notes decreased EF 45-50 %.     CHF: no evidence of fluid overload at this time  - elevated BNP      DM: ISS    Essential HTN ; resume home medication with holding parameters.    Discussed with Cardiology attending. Will transfer to [facility] later today for right hip surgery.   Patient is high risk. Cardio and Ortho agree with transfer.       83 yo male w/ pmhx of CKD, DM, HTN presented to the ED [facility]IBEM[facility] 2/2 due to fall.      Right hip femoral fracture;  2/2 due to fall  - consent obtained by ortho with son to procedure  - recommend Cardiology consult, given  elevated troponin, elevated BNP  - intermediate risk surgery  - Given patient age and as well as comorbidities, patient is high risk for postoperative complication.  - revised cardiac risk 2 points, 10.1 % 30 days risk of death, MI or cardiac arrest     Pneumonia: c/w Rocephin as well as azithromycin     COVID- 19 : No evidence of Hypoxia, no treatment needed.        Delirium: multifactorial in the acute setting from Hip fracture as well as PNA  - frequent orientation to time and place  - UA not suggestive for UTI      Elevated Troponin:  continue to trend, unable to assess if any chest pain, patient is currently confused  - TTE pending notes decreased EF 45-50 %.     CHF: no evidence of fluid overload at this time  - elevated BNP      DM: ISS    Essential HTN ; resume home medication with holding parameters.    Discussed with Cardiology attending. Will transfer to [facility] later today for right hip surgery.     Patient is high risk. Cardio and Ortho agree with transfer. Increase Coreg today. Can be NPO after 12 midnight for OR Sunday.     Medically optimized for OR Sunday.

## 2023-02-18 NOTE — DISCHARGE NOTE PROVIDER - CARE PROVIDER_API CALL
Gerry Elliott)  Internal Medicine  [facility] [place] [address]  Phone: [phone]  Fax: [phone]  Follow Up Time:

## 2023-02-18 NOTE — DISCHARGE NOTE PROVIDER - NSDCMRMEDTOKEN_GEN_ALL_CORE_FT
azithromycin 500 mg intravenous injection: 500 milligram(s) intravenous every 24 hours  carvedilol 12.5 mg oral tablet: 1 tab(s) orally 2 times a day  cefTRIAXone: once a day  HYDRALAZINE 50 MG TABLET: each orally once a day  insulin lispro 100 units/mL injectable solution: injectable 3 times a day (before meals)  LOSARTAN 50MG TAB: 1 tab(s) orally once a day  SIMVASTATIN 40 MG TABLET: take 1 tablet by mouth once daily

## 2023-02-18 NOTE — CHART NOTE - NSCHARTNOTEFT_GEN_A_CORE
Patient to be transferred to [facility] for surgical repair of right hip. Patient agreed for transfer after lengthy discussion with him and son. Packet complete.

## 2023-02-19 ENCOUNTER — TRANSCRIPTION ENCOUNTER (OUTPATIENT)
Age: 83
End: 2023-02-19

## 2023-02-19 ENCOUNTER — INPATIENT (INPATIENT)
Facility: HOSPITAL | Age: 83
LOS: 9 days | Discharge: NURSING, REHAB OR LONG-TERM CARE | DRG: 521 | End: 2023-03-01
Attending: INTERNAL MEDICINE | Admitting: INTERNAL MEDICINE
Payer: MEDICARE

## 2023-02-19 VITALS
HEART RATE: 88 BPM | OXYGEN SATURATION: 98 % | DIASTOLIC BLOOD PRESSURE: 96 MMHG | SYSTOLIC BLOOD PRESSURE: 154 MMHG | TEMPERATURE: 97 F | RESPIRATION RATE: 17 BRPM

## 2023-02-19 VITALS
TEMPERATURE: 98 F | SYSTOLIC BLOOD PRESSURE: 156 MMHG | HEART RATE: 88 BPM | OXYGEN SATURATION: 98 % | DIASTOLIC BLOOD PRESSURE: 88 MMHG | RESPIRATION RATE: 17 BRPM

## 2023-02-19 DIAGNOSIS — M84.459A PATHOLOGICAL FRACTURE, HIP, UNSPECIFIED, INITIAL ENCOUNTER FOR FRACTURE: ICD-10-CM

## 2023-02-19 LAB
ANION GAP SERPL CALC-SCNC: 13 MMOL/L — SIGNIFICANT CHANGE UP (ref 5–17)
ANION GAP SERPL CALC-SCNC: 14 MMOL/L — SIGNIFICANT CHANGE UP (ref 5–17)
ANION GAP SERPL CALC-SCNC: 16 MMOL/L — SIGNIFICANT CHANGE UP (ref 5–17)
APTT BLD: 27.9 SEC — SIGNIFICANT CHANGE UP (ref 27.5–35.5)
APTT BLD: 28.6 SEC — SIGNIFICANT CHANGE UP (ref 27.5–35.5)
BLD GP AB SCN SERPL QL: NEGATIVE — SIGNIFICANT CHANGE UP
BUN SERPL-MCNC: 76 MG/DL — HIGH (ref 7–23)
BUN SERPL-MCNC: 79 MG/DL — HIGH (ref 7–23)
BUN SERPL-MCNC: 79 MG/DL — HIGH (ref 7–23)
CALCIUM SERPL-MCNC: 8.2 MG/DL — LOW (ref 8.4–10.5)
CALCIUM SERPL-MCNC: 8.7 MG/DL — SIGNIFICANT CHANGE UP (ref 8.4–10.5)
CALCIUM SERPL-MCNC: 8.9 MG/DL — SIGNIFICANT CHANGE UP (ref 8.4–10.5)
CHLORIDE SERPL-SCNC: 109 MMOL/L — HIGH (ref 96–108)
CHLORIDE SERPL-SCNC: 111 MMOL/L — HIGH (ref 96–108)
CHLORIDE SERPL-SCNC: 112 MMOL/L — HIGH (ref 96–108)
CK MB BLD-MCNC: 2.1 % — SIGNIFICANT CHANGE UP (ref 0–3.5)
CK MB CFR SERPL CALC: 4.3 NG/ML — SIGNIFICANT CHANGE UP (ref 0–6.7)
CK SERPL-CCNC: 207 U/L — HIGH (ref 30–200)
CK SERPL-CCNC: 369 U/L — HIGH (ref 30–200)
CO2 SERPL-SCNC: 14 MMOL/L — LOW (ref 22–31)
CO2 SERPL-SCNC: 16 MMOL/L — LOW (ref 22–31)
CO2 SERPL-SCNC: 17 MMOL/L — LOW (ref 22–31)
CREAT SERPL-MCNC: 4.42 MG/DL — HIGH (ref 0.5–1.3)
CREAT SERPL-MCNC: 4.43 MG/DL — HIGH (ref 0.5–1.3)
CREAT SERPL-MCNC: 4.52 MG/DL — HIGH (ref 0.5–1.3)
D DIMER BLD IA.RAPID-MCNC: 1295 NG/ML DDU — HIGH
EGFR: 12 ML/MIN/1.73M2 — LOW
EGFR: 13 ML/MIN/1.73M2 — LOW
EGFR: 13 ML/MIN/1.73M2 — LOW
GAS PNL BLDA: SIGNIFICANT CHANGE UP
GLUCOSE BLDC GLUCOMTR-MCNC: 169 MG/DL — HIGH (ref 70–99)
GLUCOSE BLDC GLUCOMTR-MCNC: 198 MG/DL — HIGH (ref 70–99)
GLUCOSE BLDC GLUCOMTR-MCNC: 97 MG/DL — SIGNIFICANT CHANGE UP (ref 70–99)
GLUCOSE SERPL-MCNC: 128 MG/DL — HIGH (ref 70–99)
GLUCOSE SERPL-MCNC: 190 MG/DL — HIGH (ref 70–99)
GLUCOSE SERPL-MCNC: 237 MG/DL — HIGH (ref 70–99)
HCT VFR BLD CALC: 32.2 % — LOW (ref 39–50)
HCT VFR BLD CALC: 37.9 % — LOW (ref 39–50)
HGB BLD-MCNC: 11.6 G/DL — LOW (ref 13–17)
HGB BLD-MCNC: 9.8 G/DL — LOW (ref 13–17)
INR BLD: 1.19 RATIO — HIGH (ref 0.88–1.16)
INR BLD: 1.26 RATIO — HIGH (ref 0.88–1.16)
LACTATE SERPL-SCNC: 1.2 MMOL/L — SIGNIFICANT CHANGE UP (ref 0.5–2)
MAGNESIUM SERPL-MCNC: 2.6 MG/DL — SIGNIFICANT CHANGE UP (ref 1.6–2.6)
MCHC RBC-ENTMCNC: 27.1 PG — SIGNIFICANT CHANGE UP (ref 27–34)
MCHC RBC-ENTMCNC: 27.4 PG — SIGNIFICANT CHANGE UP (ref 27–34)
MCHC RBC-ENTMCNC: 30.4 GM/DL — LOW (ref 32–36)
MCHC RBC-ENTMCNC: 30.6 GM/DL — LOW (ref 32–36)
MCV RBC AUTO: 89 FL — SIGNIFICANT CHANGE UP (ref 80–100)
MCV RBC AUTO: 89.6 FL — SIGNIFICANT CHANGE UP (ref 80–100)
NRBC # BLD: 0 /100 WBCS — SIGNIFICANT CHANGE UP (ref 0–0)
NRBC # BLD: 0 /100 WBCS — SIGNIFICANT CHANGE UP (ref 0–0)
PHOSPHATE SERPL-MCNC: 4.6 MG/DL — HIGH (ref 2.5–4.5)
PLATELET # BLD AUTO: 141 K/UL — LOW (ref 150–400)
PLATELET # BLD AUTO: 146 K/UL — LOW (ref 150–400)
POTASSIUM SERPL-MCNC: 4.5 MMOL/L — SIGNIFICANT CHANGE UP (ref 3.5–5.3)
POTASSIUM SERPL-MCNC: 4.6 MMOL/L — SIGNIFICANT CHANGE UP (ref 3.5–5.3)
POTASSIUM SERPL-MCNC: 4.9 MMOL/L — SIGNIFICANT CHANGE UP (ref 3.5–5.3)
POTASSIUM SERPL-SCNC: 4.5 MMOL/L — SIGNIFICANT CHANGE UP (ref 3.5–5.3)
POTASSIUM SERPL-SCNC: 4.6 MMOL/L — SIGNIFICANT CHANGE UP (ref 3.5–5.3)
POTASSIUM SERPL-SCNC: 4.9 MMOL/L — SIGNIFICANT CHANGE UP (ref 3.5–5.3)
PROTHROM AB SERPL-ACNC: 13.8 SEC — HIGH (ref 10.5–13.4)
PROTHROM AB SERPL-ACNC: 14.7 SEC — HIGH (ref 10.5–13.4)
RBC # BLD: 3.62 M/UL — LOW (ref 4.2–5.8)
RBC # BLD: 4.23 M/UL — SIGNIFICANT CHANGE UP (ref 4.2–5.8)
RBC # FLD: 14.4 % — SIGNIFICANT CHANGE UP (ref 10.3–14.5)
RBC # FLD: 14.6 % — HIGH (ref 10.3–14.5)
RH IG SCN BLD-IMP: POSITIVE — SIGNIFICANT CHANGE UP
RH IG SCN BLD-IMP: POSITIVE — SIGNIFICANT CHANGE UP
SODIUM SERPL-SCNC: 139 MMOL/L — SIGNIFICANT CHANGE UP (ref 135–145)
SODIUM SERPL-SCNC: 141 MMOL/L — SIGNIFICANT CHANGE UP (ref 135–145)
SODIUM SERPL-SCNC: 142 MMOL/L — SIGNIFICANT CHANGE UP (ref 135–145)
TROPONIN T, HIGH SENSITIVITY RESULT: 62 NG/L — HIGH (ref 0–51)
TROPONIN T, HIGH SENSITIVITY RESULT: 65 NG/L — HIGH (ref 0–51)
WBC # BLD: 11.22 K/UL — HIGH (ref 3.8–10.5)
WBC # BLD: 12.24 K/UL — HIGH (ref 3.8–10.5)
WBC # FLD AUTO: 11.22 K/UL — HIGH (ref 3.8–10.5)
WBC # FLD AUTO: 12.24 K/UL — HIGH (ref 3.8–10.5)

## 2023-02-19 PROCEDURE — 72170 X-RAY EXAM OF PELVIS: CPT | Mod: 26

## 2023-02-19 PROCEDURE — 27236 TREAT THIGH FRACTURE: CPT | Mod: RT

## 2023-02-19 PROCEDURE — 93010 ELECTROCARDIOGRAM REPORT: CPT | Mod: 76

## 2023-02-19 DEVICE — STEM FEM ECHO FX 9MM: Type: IMPLANTABLE DEVICE | Site: RIGHT | Status: FUNCTIONAL

## 2023-02-19 DEVICE — RESTRIC CEM BUCK 25MM: Type: IMPLANTABLE DEVICE | Site: RIGHT | Status: FUNCTIONAL

## 2023-02-19 DEVICE — CUP ACETABULAR BI-P 28MM 49MM: Type: IMPLANTABLE DEVICE | Site: RIGHT | Status: FUNCTIONAL

## 2023-02-19 DEVICE — KIT A-LINE 1LUM 20G X 12CM SAFE KIT: Type: IMPLANTABLE DEVICE | Site: RIGHT | Status: FUNCTIONAL

## 2023-02-19 DEVICE — CEMENT SIMPLEX P 40GM: Type: IMPLANTABLE DEVICE | Site: RIGHT | Status: FUNCTIONAL

## 2023-02-19 DEVICE — PRO HIP FEM HD COCR 28MM STD: Type: IMPLANTABLE DEVICE | Site: RIGHT | Status: FUNCTIONAL

## 2023-02-19 DEVICE — CNTRLZR DST 12MM PMMA: Type: IMPLANTABLE DEVICE | Site: RIGHT | Status: FUNCTIONAL

## 2023-02-19 RX ORDER — ATORVASTATIN CALCIUM 80 MG/1
40 TABLET, FILM COATED ORAL AT BEDTIME
Refills: 0 | Status: DISCONTINUED | OUTPATIENT
Start: 2023-02-19 | End: 2023-02-19

## 2023-02-19 RX ORDER — ONDANSETRON 8 MG/1
4 TABLET, FILM COATED ORAL ONCE
Refills: 0 | Status: DISCONTINUED | OUTPATIENT
Start: 2023-02-19 | End: 2023-02-19

## 2023-02-19 RX ORDER — OXYCODONE HYDROCHLORIDE 5 MG/1
2.5 TABLET ORAL EVERY 4 HOURS
Refills: 0 | Status: DISCONTINUED | OUTPATIENT
Start: 2023-02-19 | End: 2023-02-20

## 2023-02-19 RX ORDER — SENNA PLUS 8.6 MG/1
2 TABLET ORAL AT BEDTIME
Refills: 0 | Status: DISCONTINUED | OUTPATIENT
Start: 2023-02-19 | End: 2023-03-01

## 2023-02-19 RX ORDER — HYDRALAZINE HCL 50 MG
50 TABLET ORAL THREE TIMES A DAY
Refills: 0 | Status: DISCONTINUED | OUTPATIENT
Start: 2023-02-19 | End: 2023-02-19

## 2023-02-19 RX ORDER — DEXTROSE 50 % IN WATER 50 %
25 SYRINGE (ML) INTRAVENOUS ONCE
Refills: 0 | Status: DISCONTINUED | OUTPATIENT
Start: 2023-02-19 | End: 2023-03-01

## 2023-02-19 RX ORDER — DEXTROSE 50 % IN WATER 50 %
12.5 SYRINGE (ML) INTRAVENOUS ONCE
Refills: 0 | Status: DISCONTINUED | OUTPATIENT
Start: 2023-02-19 | End: 2023-03-01

## 2023-02-19 RX ORDER — ACETAMINOPHEN 500 MG
1000 TABLET ORAL ONCE
Refills: 0 | Status: COMPLETED | OUTPATIENT
Start: 2023-02-20 | End: 2023-02-20

## 2023-02-19 RX ORDER — INSULIN LISPRO 100/ML
VIAL (ML) SUBCUTANEOUS
Refills: 0 | Status: DISCONTINUED | OUTPATIENT
Start: 2023-02-19 | End: 2023-03-01

## 2023-02-19 RX ORDER — MAGNESIUM HYDROXIDE 400 MG/1
30 TABLET, CHEWABLE ORAL DAILY
Refills: 0 | Status: DISCONTINUED | OUTPATIENT
Start: 2023-02-19 | End: 2023-02-20

## 2023-02-19 RX ORDER — INSULIN LISPRO 100/ML
VIAL (ML) SUBCUTANEOUS
Refills: 0 | Status: DISCONTINUED | OUTPATIENT
Start: 2023-02-19 | End: 2023-02-19

## 2023-02-19 RX ORDER — ACETAMINOPHEN 500 MG
650 TABLET ORAL EVERY 6 HOURS
Refills: 0 | Status: DISCONTINUED | OUTPATIENT
Start: 2023-02-19 | End: 2023-02-19

## 2023-02-19 RX ORDER — METOPROLOL TARTRATE 50 MG
12.5 TABLET ORAL
Refills: 0 | Status: DISCONTINUED | OUTPATIENT
Start: 2023-02-19 | End: 2023-02-20

## 2023-02-19 RX ORDER — OXYCODONE HYDROCHLORIDE 5 MG/1
5 TABLET ORAL EVERY 4 HOURS
Refills: 0 | Status: DISCONTINUED | OUTPATIENT
Start: 2023-02-19 | End: 2023-02-20

## 2023-02-19 RX ORDER — CARVEDILOL PHOSPHATE 80 MG/1
25 CAPSULE, EXTENDED RELEASE ORAL EVERY 12 HOURS
Refills: 0 | Status: DISCONTINUED | OUTPATIENT
Start: 2023-02-19 | End: 2023-02-19

## 2023-02-19 RX ORDER — SODIUM CHLORIDE 9 MG/ML
1000 INJECTION INTRAMUSCULAR; INTRAVENOUS; SUBCUTANEOUS
Refills: 0 | Status: DISCONTINUED | OUTPATIENT
Start: 2023-02-19 | End: 2023-02-20

## 2023-02-19 RX ORDER — DEXTROSE 50 % IN WATER 50 %
12.5 SYRINGE (ML) INTRAVENOUS ONCE
Refills: 0 | Status: DISCONTINUED | OUTPATIENT
Start: 2023-02-19 | End: 2023-02-19

## 2023-02-19 RX ORDER — SODIUM CHLORIDE 9 MG/ML
1000 INJECTION, SOLUTION INTRAVENOUS
Refills: 0 | Status: DISCONTINUED | OUTPATIENT
Start: 2023-02-19 | End: 2023-03-01

## 2023-02-19 RX ORDER — DEXTROSE 50 % IN WATER 50 %
15 SYRINGE (ML) INTRAVENOUS ONCE
Refills: 0 | Status: DISCONTINUED | OUTPATIENT
Start: 2023-02-19 | End: 2023-03-01

## 2023-02-19 RX ORDER — ATORVASTATIN CALCIUM 80 MG/1
40 TABLET, FILM COATED ORAL AT BEDTIME
Refills: 0 | Status: DISCONTINUED | OUTPATIENT
Start: 2023-02-19 | End: 2023-03-01

## 2023-02-19 RX ORDER — INSULIN LISPRO 100/ML
VIAL (ML) SUBCUTANEOUS AT BEDTIME
Refills: 0 | Status: DISCONTINUED | OUTPATIENT
Start: 2023-02-19 | End: 2023-03-01

## 2023-02-19 RX ORDER — CEFAZOLIN SODIUM 1 G
2000 VIAL (EA) INJECTION EVERY 8 HOURS
Refills: 0 | Status: COMPLETED | OUTPATIENT
Start: 2023-02-19 | End: 2023-02-20

## 2023-02-19 RX ORDER — CEFTRIAXONE 500 MG/1
1000 INJECTION, POWDER, FOR SOLUTION INTRAMUSCULAR; INTRAVENOUS EVERY 24 HOURS
Refills: 0 | Status: DISCONTINUED | OUTPATIENT
Start: 2023-02-19 | End: 2023-02-19

## 2023-02-19 RX ORDER — HEPARIN SODIUM 5000 [USP'U]/ML
5000 INJECTION INTRAVENOUS; SUBCUTANEOUS EVERY 8 HOURS
Refills: 0 | Status: DISCONTINUED | OUTPATIENT
Start: 2023-02-19 | End: 2023-02-19

## 2023-02-19 RX ORDER — AZITHROMYCIN 500 MG/1
500 TABLET, FILM COATED ORAL EVERY 24 HOURS
Refills: 0 | Status: DISCONTINUED | OUTPATIENT
Start: 2023-02-19 | End: 2023-02-19

## 2023-02-19 RX ORDER — DEXTROSE 50 % IN WATER 50 %
15 SYRINGE (ML) INTRAVENOUS ONCE
Refills: 0 | Status: DISCONTINUED | OUTPATIENT
Start: 2023-02-19 | End: 2023-02-19

## 2023-02-19 RX ORDER — ACETAMINOPHEN 500 MG
975 TABLET ORAL EVERY 8 HOURS
Refills: 0 | Status: DISCONTINUED | OUTPATIENT
Start: 2023-02-20 | End: 2023-03-01

## 2023-02-19 RX ORDER — MAGNESIUM SULFATE 500 MG/ML
2 VIAL (ML) INJECTION ONCE
Refills: 0 | Status: COMPLETED | OUTPATIENT
Start: 2023-02-19 | End: 2023-02-19

## 2023-02-19 RX ORDER — HYDROMORPHONE HYDROCHLORIDE 2 MG/ML
0.25 INJECTION INTRAMUSCULAR; INTRAVENOUS; SUBCUTANEOUS
Refills: 0 | Status: DISCONTINUED | OUTPATIENT
Start: 2023-02-19 | End: 2023-02-19

## 2023-02-19 RX ORDER — DEXTROSE 50 % IN WATER 50 %
25 SYRINGE (ML) INTRAVENOUS ONCE
Refills: 0 | Status: DISCONTINUED | OUTPATIENT
Start: 2023-02-19 | End: 2023-02-19

## 2023-02-19 RX ORDER — GLUCAGON INJECTION, SOLUTION 0.5 MG/.1ML
1 INJECTION, SOLUTION SUBCUTANEOUS ONCE
Refills: 0 | Status: DISCONTINUED | OUTPATIENT
Start: 2023-02-19 | End: 2023-03-01

## 2023-02-19 RX ORDER — ACETAMINOPHEN 500 MG
1000 TABLET ORAL ONCE
Refills: 0 | Status: COMPLETED | OUTPATIENT
Start: 2023-02-19 | End: 2023-02-19

## 2023-02-19 RX ORDER — SODIUM CHLORIDE 9 MG/ML
1000 INJECTION, SOLUTION INTRAVENOUS
Refills: 0 | Status: DISCONTINUED | OUTPATIENT
Start: 2023-02-19 | End: 2023-02-19

## 2023-02-19 RX ORDER — POLYETHYLENE GLYCOL 3350 17 G/17G
17 POWDER, FOR SOLUTION ORAL AT BEDTIME
Refills: 0 | Status: DISCONTINUED | OUTPATIENT
Start: 2023-02-19 | End: 2023-03-01

## 2023-02-19 RX ORDER — LANOLIN ALCOHOL/MO/W.PET/CERES
3 CREAM (GRAM) TOPICAL AT BEDTIME
Refills: 0 | Status: DISCONTINUED | OUTPATIENT
Start: 2023-02-19 | End: 2023-02-20

## 2023-02-19 RX ORDER — PANTOPRAZOLE SODIUM 20 MG/1
40 TABLET, DELAYED RELEASE ORAL
Refills: 0 | Status: DISCONTINUED | OUTPATIENT
Start: 2023-02-19 | End: 2023-03-01

## 2023-02-19 RX ORDER — HEPARIN SODIUM 5000 [USP'U]/ML
5000 INJECTION INTRAVENOUS; SUBCUTANEOUS EVERY 12 HOURS
Refills: 0 | Status: DISCONTINUED | OUTPATIENT
Start: 2023-02-20 | End: 2023-02-20

## 2023-02-19 RX ORDER — GLUCAGON INJECTION, SOLUTION 0.5 MG/.1ML
1 INJECTION, SOLUTION SUBCUTANEOUS ONCE
Refills: 0 | Status: DISCONTINUED | OUTPATIENT
Start: 2023-02-19 | End: 2023-02-19

## 2023-02-19 RX ORDER — TRAMADOL HYDROCHLORIDE 50 MG/1
25 TABLET ORAL EVERY 6 HOURS
Refills: 0 | Status: DISCONTINUED | OUTPATIENT
Start: 2023-02-19 | End: 2023-02-26

## 2023-02-19 RX ADMIN — Medication 1: at 10:25

## 2023-02-19 RX ADMIN — Medication 400 MILLIGRAM(S): at 21:18

## 2023-02-19 RX ADMIN — CEFTRIAXONE 100 MILLIGRAM(S): 500 INJECTION, POWDER, FOR SOLUTION INTRAMUSCULAR; INTRAVENOUS at 09:54

## 2023-02-19 RX ADMIN — Medication 12.5 MILLIGRAM(S): at 21:38

## 2023-02-19 RX ADMIN — CEFTRIAXONE 100 MILLIGRAM(S): 500 INJECTION, POWDER, FOR SOLUTION INTRAMUSCULAR; INTRAVENOUS at 02:19

## 2023-02-19 RX ADMIN — Medication 100 MILLIGRAM(S): at 21:18

## 2023-02-19 RX ADMIN — Medication 1: at 17:23

## 2023-02-19 RX ADMIN — Medication 25 GRAM(S): at 21:39

## 2023-02-19 RX ADMIN — AZITHROMYCIN 255 MILLIGRAM(S): 500 TABLET, FILM COATED ORAL at 10:25

## 2023-02-19 RX ADMIN — ATORVASTATIN CALCIUM 40 MILLIGRAM(S): 80 TABLET, FILM COATED ORAL at 21:18

## 2023-02-19 RX ADMIN — SODIUM CHLORIDE 75 MILLILITER(S): 9 INJECTION INTRAMUSCULAR; INTRAVENOUS; SUBCUTANEOUS at 17:22

## 2023-02-19 RX ADMIN — AZITHROMYCIN 255 MILLIGRAM(S): 500 TABLET, FILM COATED ORAL at 04:15

## 2023-02-19 NOTE — PRE-ANESTHESIA EVALUATION ADULT - NSANTHOSAYNRD_GEN_A_CORE
No. JUDIT screening performed.  STOP BANG Legend: 0-2 = LOW Risk; 3-4 = INTERMEDIATE Risk; 5-8 = HIGH Risk

## 2023-02-19 NOTE — CONSULT NOTE ADULT - SUBJECTIVE AND OBJECTIVE BOX
[facility]  [phone]  NEPHROLOGY      INITIAL CONSULT NOTE  --------------------------------------------------------------------------------  HPI:    83 yo male w/ pmhx of CKD, DM, HTN presented to the ED BIBEMS 2/2 due to fall. Right hip femoral fracture;  2/2 due to fall and NPO On 2/19/2023 for OR for hip surgery. noticed with abn creatinine 4.9 now at 4.4 pt and son state pt does not have ckd however, [place] [facility] [place] once only. was given a medication does not recall what med was given. given abn cr renal consult called. pt and son are poor historians       PAST HISTORY  --------------------------------------------------------------------------------  PAST MEDICAL & SURGICAL HISTORY:  DM (diabetes mellitus)      CHF (congestive heart failure)        FAMILY HISTORY:    PAST SOCIAL HISTORY:    ALLERGIES & MEDICATIONS  --------------------------------------------------------------------------------  Allergies    No Known Allergies    Intolerances      Standing Inpatient Medications  atorvastatin 40 milliGRAM(s) Oral at bedtime  azithromycin  IVPB 500 milliGRAM(s) IV Intermittent every 24 hours  carvedilol 25 milliGRAM(s) Oral every 12 hours  cefTRIAXone   IVPB 1000 milliGRAM(s) IV Intermittent every 24 hours  dextrose 5%. 1000 milliLiter(s) IV Continuous <Continuous>  dextrose 5%. 1000 milliLiter(s) IV Continuous <Continuous>  dextrose 50% Injectable 25 Gram(s) IV Push once  dextrose 50% Injectable 12.5 Gram(s) IV Push once  dextrose 50% Injectable 25 Gram(s) IV Push once  glucagon  Injectable 1 milliGRAM(s) IntraMuscular once  heparin   Injectable 5000 Unit(s) SubCutaneous every 8 hours  hydrALAZINE 50 milliGRAM(s) Oral three times a day  insulin lispro (ADMELOG) corrective regimen sliding scale   SubCutaneous three times a day before meals    PRN Inpatient Medications  dextrose Oral Gel 15 Gram(s) Oral once PRN      REVIEW OF SYSTEMS  --------------------------------------------------------------------------------  Gen: No  fevers/chills   Skin: No itching   Head/Eyes/Ears/Mouth: No headache; Normal hearing;  No sinus pain/discomfort, sore throat  Respiratory: No dyspnea, cough +  no wheezing,  CV: No chest pain, or palp   GI: No abdominal pain, diarrhea, nausea, vomiting,  : No dysuria, decrease urination or hesitancy urinating  hematuria  MSK: right hip area pain  no back pain  Neuro: No dizziness/lightheadedness  also with no edema   has had laser tx to eyes and cataract sx       VITALS/PHYSICAL EXAM  < from: CT Abdomen and Pelvis No Cont (02.16.23 @ 22:34) >    ACC: [id number] EXAM:  CT ABDOMEN AND PELVIS   ORDERED BY: JOSE JHA     PROCEDURE DATE:  02/16/2023          INTERPRETATION:  CLINICAL INFORMATION: Right hip and abdominal pain after   fall. Acute renal failure.    COMPARISON: None.    CONTRAST/COMPLICATIONS:  IV Contrast: None  Oral Contrast: None  Complications: None    PROCEDURE:  CT of the Abdomen and Pelvis was performed.  Sagittal and coronal reformats were performed.    FINDINGS:  LOWER CHEST: Heart is enlarged. There is trace pericardial fluid.   Coronary, valvular and aortic calcifications are appreciated. Bibasilar   atelectatic changes are seen posteriorly. Motion artifact otherwise   limits evaluation for fine pulmonary parenchymal detail..    LIVER: Within normal limits.  BILEDUCTS: Normal caliber.  GALLBLADDER: There are layering stones versus sludge. Calcification   between the left and caudate lobes is likely sequelae of old   granulomatous disease or vascular in nature. The liver is otherwise   unremarkable..  SPLEEN:Within normal limits.  PANCREAS: Within normal limits.  ADRENALS: Nonspecific thickened appearance of the bilateral adrenal   glands, likely related to hyperplasia.  KIDNEYS/URETERS: Left-sided renal cysts. Bilateral renal hilar   calcifications are somewhat linear configuration likely vascular in   nature. Nonobstructing stones not excluded. Extensive bilateral   perinephric stranding is appreciated, symmetric. There is no urinary   tract obstruction.    BLADDER: Wall thickening.  REPRODUCTIVE ORGANS: Prostate gland is enlarged.    BOWEL: No bowel obstruction. Appendix  is normal. There is a small hiatal   hernia. Diffuse wall thickening of the visualized distal esophagus is   noted. There is questionable gastric wall thickening, though the stomach   is also under distended.  PERITONEUM: Trace fluid is seen within the cul-de-sac, abnormal in a male   patient..  VESSELS: Atherosclerotic changes.  RETROPERITONEUM/LYMPH NODES: No lymphadenopathy.  ABDOMINAL WALL: Generalized soft tissue edema..  BONES: There is a displaced right femoral neck fracture. This is of   uncertain age, though is at least subacute or acute nature.    IMPRESSION:  There is a displaced right femoral neck fracture, acute or subacute.    Urinary bladder wall thickening which may be related to sequelae of   chronic outlet obstruction from prostate enlargement. Superimposed   cystitis should be evaluated for clinically, correlation with urinalysis.    Symmetric bilateral perinephric stranding, which may indicate underlying   pyelonephritis/nephritis or nephropathy. Correlate clinically.    Nonspecific trace free fluid seen within the cul-de-sac.    Thickened appearance of the distal esophagus and stomach which may   represent esophagitis/gastritis. Correlate clinically.    Cardiomegaly.    Additional findings as above.        --- End of Report ---            VALDEZ HARTMAN MD; Attending Radiologist  This document has been electronically signed. Feb 16 2023 11:49PM    < end of copied text >  < from: CT Head No Cont (02.16.23 @ 22:32) >    ACC: [id number] EXAM:  CT BRAIN   ORDERED BY: JOSE JHA     ACC: [id number] EXAM:  CT CERVICAL SPINE   ORDERED BY: JOSE JHA     PROCEDURE DATE:  02/16/2023          INTERPRETATION:  CLINICAL INFORMATION:  trauma    TECHNIQUE: Multiple axial CT images of the brain and cervical spine were   obtained without the administration of IV contrast.  Multiplanar 2D   reformations obtained from thin slice transverse reconstructions.    COMPARISON:  None available.    FINDINGS:    There is generalized volume loss. Periventricular matter changes are   appreciated. Encephalomalacia of the left occipital lobe and right   parietal lobes compatible with sequelae of previous infarcts or prior   trauma. Focal lucency of the right thalamus is likely old lacunar   infarct. There is an age-indeterminate lucency of the left ronni, also   likely old lacunar infarct. There is no sulcal effacement. There is no   intracranial hemorrhage, extra axial fluid collection, mass effect or   midline shift. There is no acute large vessel territorial infarct. The   skull is intact. Right frontal sinus osteoma. The paranasal sinuses and   mastoid air cells are clear.    The C1 ring is intact.There is no odontoid fracture. Degenerative changes   of the C1-2 articulation appreciated. Chronic appearing vertebral body   height loss of C5-C6 appreciated. Remaining vertebral body heights are   within normal limits. There is a grade 1 anterolisthesis of C4 on C5.   Intervertebral disc space narrowing is appreciated from [id number] C7,   with partial fusion at C4-5. Extensive osteophyte formation is   appreciated both anteriorly and posteriorly. Posterior osteophyte   formation causes spinal canal stenosis at the C5-6 level. Multilevel   uncovertebral hypertrophy and posterior facet arthropathies appreciated.   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 There is no   acute fracture.    Prevertebral soft tissues are normal.    Emphysematous changes of the lung apices are appreciated.    IMPRESSION:  No acute intracranial pathology. Encephalomalacia left occipital right   parietal lobes, compatible with sequelae of previous/chronic infarct or   sequelae of previous trauma.    Atrophy and chronic small vessel ischemic changes.    Extensive degenerative changes cervical spine.    No acute cervical spine fracture.    --- End of Report ---        < end of copied text >  --------------------------------------------------------------------------------  T(C): 36.7 (02-19-23 @ 06:10), Max: 36.8 (02-18-23 @ 10:54)  HR: 88 (02-19-23 @ 06:10) (71 - 110)  BP: 156/88 (02-19-23 @ 06:10) (127/66 - 183/84)  RR: 17 (02-19-23 @ 06:10) (17 - 18)  SpO2: 98% (02-19-23 @ 06:10) (97% - 100%)  Wt(kg): --        Physical Exam:  	Gen: Non toxic comfortable appearing   	no jvd , supple neck,   	Pulm: decrease bs  no rales or ronchi or wheezing  	CV: RRR, S1S2; no rub  	Abd: +BS, soft, nontender/nondistended  	: No suprapubic tenderness  	UE: Warm, no cyanosis  no clubbing,  no edema  	LE: Warm, no cyanosis  no clubbing, no edema abrasions noted leg   	Neuro: alert and oriented. speech coherent   	Skin: Warm, no decrease skin turgor     LABS/STUDIES  --------------------------------------------------------------------------------              11.6   12.24 >-----------<  146      [02-19-23 @ 09:47]              37.9     145  |  116  |  67  ----------------------------<  243      [02-18-23 @ 07:05]  4.8   |  19  |  4.46        Ca     8.6     [02-18-23 @ 07:05]      Mg     2.4     [02-18-23 @ 07:05]      Phos  4.2     [02-18-23 @ 07:05]      PT/INR: PT 14.9 , INR 1.25       [02-18-23 @ 07:05]  PTT: 32.4       [02-18-23 @ 07:05]          [02-17-23 @ 12:20]    Creatinine Trend:  SCr 4.46 [02-18 @ 07:05]  SCr 4.56 [02-17 @ 08:00]  SCr 4.93 [02-16 @ 19:25]    Urinalysis - [02-16-23 @ 22:15]      Color Yellow / Appearance Clear / SG 1.015 / pH 6.0      Gluc 1000 / Ketone Trace  / Bili Negative / Urobili Negative       Blood Small / Protein 500 / Leuk Est Negative / Nitrite Negative      RBC 0-2 / WBC Negative / Hyaline  / Gran  / Sq Epi  / Non Sq Epi Occasional / Bacteria Occasional

## 2023-02-19 NOTE — PROGRESS NOTE ADULT - ASSESSMENT
83 yo male w/ pmhx of CKD, DM, HTN presented to the ED BIBEMS 2/2 due to fall.        Right hip femoral fracture  - 2/2 due to fall  - consent obtained by ortho with son to procedure  - recommend Cardiology consult, given  elevated troponin, elevated BNP  - intermediate risk surgery  - Given patient age and as well as comorbidities, patient is high risk for postoperative complication.  - revised cardiac risk 2 points, 10.1 % 30 days risk of death, MI or cardiac arrest     Pneumonia  - noted on imaging  - c/w Rocephin as well as azithromycin     COVID- 19   - No evidence of Hypoxia  - Continue to trend inflammatory markers      Delirium  - multifactorial in the acute setting from Hip fracture as well as PNA  - frequent orientation to time and place  - UA not suggestive for UTI      Elevated Troponin  - continue to trend  - unable to assess if any chest pain, patient is currently confused  - TTE pending    CHF  - no evidence of fluid overload at this time  - elevated BNP      DM  -ISS    Essential HTN  - resume home medication with holding parameters      Preventive measures  - DVT- SCD- as patient one medically optimize will being going to the OR  81 yo male w/ pmhx of CKD, DM, HTN presented to the ED [facility] 2/2 due to fall.        Right hip femoral fracture: due to fall  - consent obtained by ortho with son to procedure  - recommend Cardiology consult, given  elevated troponin, elevated BNP  - intermediate risk surgery  - Given patient age and as well as comorbidities, patient is high risk for postoperative complication.  - revised cardiac risk 2 points, 10.1 % 30 days risk of death, MI or cardiac arrest .     Overall is high risk for surgery. Stable for OR today. Medically optimized.     Pneumonia: noted on CXR, c/w Rocephin as well as azithromycin . CT chest tomorrow.      COVID- 19 :  No evidence of Hypoxia, no treatment needed.     Delirium: multifactorial in the acute setting from Hip fracture as well as PNA, frequent orientation to time and place, UA not suggestive for UTI    Elevated Troponin; continue to trend, unable to assess if any chest pain, patient is currently confused  - TTE decreased EF 40 %. EKG notes inferior and septal MI. CHF, euvolemic. Will ask Cardio to follow here as well.     DM: stable, hold all meds. SSC.     Essential HTN: resume home medication with holding parameters.     Renal: Creatinine 4.46. Renal consult was called this AM. Dr. Cook will see. Renal sonogram Monday. Consider bicarb and Phoslo.     CKD V appears chronic. Has outside nephrologist.     Discussed with son today at bedside, aware of operative risks.

## 2023-02-19 NOTE — PROGRESS NOTE ADULT - SUBJECTIVE AND OBJECTIVE BOX
INTERVAL HPI/OVERNIGHT EVENTS:  Pt seen and examined at bedside.     Allergies/Intolerance: No Known Allergies      MEDICATIONS  (STANDING):    MEDICATIONS  (PRN):        ROS: all systems reviewed and wnl      PHYSICAL EXAMINATION:  Vital Signs Last 24 Hrs  T(C): 36.7 (19 Feb 2023 06:10), Max: 36.8 (18 Feb 2023 10:54)  T(F): 98.1 (19 Feb 2023 06:10), Max: 98.3 (18 Feb 2023 10:54)  HR: 88 (19 Feb 2023 06:10) (71 - 110)  BP: 156/88 (19 Feb 2023 06:10) (127/66 - 183/84)  BP(mean): 117 (18 Feb 2023 22:06) (117 - 117)  RR: 17 (19 Feb 2023 06:10) (17 - 18)  SpO2: 98% (19 Feb 2023 06:10) (97% - 100%)    Parameters below as of 19 Feb 2023 06:10  Patient On (Oxygen Delivery Method): room air      CAPILLARY BLOOD GLUCOSE      POCT Blood Glucose.: 250 mg/dL (18 Feb 2023 23:15)  POCT Blood Glucose.: 302 mg/dL (18 Feb 2023 21:54)  POCT Blood Glucose.: 205 mg/dL (18 Feb 2023 11:26)        GENERAL:   NECK: supple, No JVD  CHEST/LUNG: clear to auscultation bilaterally; no rales, rhonchi, or wheezing b/l  HEART: normal S1, S2  ABDOMEN: BS+, soft, ND, NT   EXTREMITIES:  pulses palpable; no clubbing, cyanosis, or edema b/l LEs  SKIN: no rashes or lesions      LABS:                        11.9   17.70 )-----------( 169      ( 18 Feb 2023 07:05 )             38.6     02-18    145  |  116<H>  |  67<H>  ----------------------------<  243<H>  4.8   |  19<L>  |  4.46<H>    Ca    8.6      18 Feb 2023 07:05  Phos  4.2     02-18  Mg     2.4     02-18      PT/INR - ( 18 Feb 2023 07:05 )   PT: 14.9 sec;   INR: 1.25 ratio         PTT - ( 18 Feb 2023 07:05 )  PTT:32.4 sec           INTERVAL HPI/OVERNIGHT EVENTS:  Pt seen and examined at bedside.     Allergies/Intolerance: No Known Allergies      MEDICATIONS  (STANDING):    MEDICATIONS  (PRN):        ROS: all systems reviewed and wnl      PHYSICAL EXAMINATION:  Vital Signs Last 24 Hrs  T(C): 36.7 (19 Feb 2023 06:10), Max: 36.8 (18 Feb 2023 10:54)  T(F): 98.1 (19 Feb 2023 06:10), Max: 98.3 (18 Feb 2023 10:54)  HR: 88 (19 Feb 2023 06:10) (71 - 110)  BP: 156/88 (19 Feb 2023 06:10) (127/66 - 183/84)  BP(mean): 117 (18 Feb 2023 22:06) (117 - 117)  RR: 17 (19 Feb 2023 06:10) (17 - 18)  SpO2: 98% (19 Feb 2023 06:10) (97% - 100%)    Parameters below as of 19 Feb 2023 06:10  Patient On (Oxygen Delivery Method): room air      CAPILLARY BLOOD GLUCOSE      POCT Blood Glucose.: 250 mg/dL (18 Feb 2023 23:15)  POCT Blood Glucose.: 302 mg/dL (18 Feb 2023 21:54)  POCT Blood Glucose.: 205 mg/dL (18 Feb 2023 11:26)        GENERAL: stable on RA, alert, son at bedside, no fevers or CP  NECK: supple, No JVD  CHEST/LUNG: clear to auscultation bilaterally; no rales, rhonchi, or wheezing b/l  HEART: normal S1, S2  ABDOMEN: BS+, soft, ND, NT   EXTREMITIES:  pulses palpable; no clubbing, cyanosis, or edema b/l LEs    LABS:                        11.9   17.70 )-----------( 169      ( 18 Feb 2023 07:05 )             38.6     02-18    145  |  116<H>  |  67<H>  ----------------------------<  243<H>  4.8   |  19<L>  |  4.46<H>    Ca    8.6      18 Feb 2023 07:05  Phos  4.2     02-18  Mg     2.4     02-18      PT/INR - ( 18 Feb 2023 07:05 )   PT: 14.9 sec;   INR: 1.25 ratio         PTT - ( 18 Feb 2023 07:05 )  PTT:32.4 sec

## 2023-02-19 NOTE — PRE-ANESTHESIA EVALUATION ADULT - NSANTHADDINFOFT_GEN_ALL_CORE
extensive rba d/w son/hcp, including that pt is at elevated risk of darlene-op morbidity/mortality given medical condition (nsqip calculated chance of serious complication ~25%) - understands and agrees with plan

## 2023-02-19 NOTE — CONSULT NOTE ADULT - SUBJECTIVE AND OBJECTIVE BOX
ortho to see HPI  83yMale c/o R hip pain s/p mechanical fall. Unable to bear weight in the RLE since the fall. Denies headstrike or LOC. Denies numbness/tingling in the RLE. Denies any other trauma/injuries at this time.    ROS  Negative unless otherwise specified in HPI.    PAST MEDICAL & SURGICAL Hx  PAST MEDICAL & SURGICAL HISTORY:  DM (diabetes mellitus)  CHF (congestive heart failure)    MEDICATIONS  Home Medications:  azithromycin 500 mg intravenous injection: 500 milligram(s) intravenous every 24 hours (18 Feb 2023 18:47)  carvedilol 12.5 mg oral tablet: 1 tab(s) orally 2 times a day (17 Feb 2023 15:03)  cefTRIAXone: once a day (18 Feb 2023 18:47)  HYDRALAZINE 50 MG TABLET: each orally once a day (18 Feb 2023 18:47)  insulin lispro 100 units/mL injectable solution: injectable 3 times a day (before meals) (18 Feb 2023 18:47)  LOSARTAN 50MG TAB: 1 tab(s) orally once a day (17 Feb 2023 15:03)  SIMVASTATIN 40 MG TABLET: take 1 tablet by mouth once daily (17 Feb 2023 15:03)    ALLERGIES  No Known Allergies    VITALS  Vital Signs Last 24 Hrs  T(C): 36.7 (19 Feb 2023 06:10), Max: 36.8 (18 Feb 2023 10:54)  T(F): 98.1 (19 Feb 2023 06:10), Max: 98.3 (18 Feb 2023 10:54)  HR: 88 (19 Feb 2023 06:10) (71 - 110)  BP: 156/88 (19 Feb 2023 06:10) (127/66 - 183/84)  BP(mean): 117 (18 Feb 2023 22:06) (117 - 117)  RR: 17 (19 Feb 2023 06:10) (17 - 18)  SpO2: 98% (19 Feb 2023 06:10) (97% - 100%)  Parameters below as of 19 Feb 2023 06:10  Patient On (Oxygen Delivery Method): room air    PHYSICAL EXAM  Gen: Lying in bed, NAD  Resp: No increased WOB  RLE:  Minor chronic abrasions over anterior lower leg otherwise skin intact   Mild TTP over R hip, no TTP along remainder of extremity; compartments soft  Limited ROM at hip 2/2 pain  +Log roll test  +Pain with axial loading  Motor: TA/EHL/GS/FHL intact  Sensory: DP/SP/Tib/Angeline/Saph SILT  +DP pulse, WWP    Secondary survey:  No TTP along spine or other extremities, pelvis grossly stable, SILT and compartments soft throughout    LABS                        11.9   17.70 )-----------( 169      ( 18 Feb 2023 07:05 )             38.6     02-18    145  |  116<H>  |  67<H>  ----------------------------<  243<H>  4.8   |  19<L>  |  4.46<H>    Ca    8.6      18 Feb 2023 07:05  Phos  4.2     02-18  Mg     2.4     02-18    PT/INR - ( 18 Feb 2023 07:05 )   PT: 14.9 sec;   INR: 1.25 ratio    PTT - ( 18 Feb 2023 07:05 )  PTT:32.4 sec    IMAGING  XRs: R femoral neck fx (personal read)    ASSESSMENT & PLAN  83yMale w/ R femoral neck fx.  -NWB RLE, bedrest  -OR on 2/19/23  -f/u preop: CBC, BMP, coags, T&S x2, CXR, EKG, COVID  -NPO, IVF  -hold chemical DVT ppx for OR; SCDs OK  -medical clearance appreciated  -pain control  -ice/cold compress

## 2023-02-19 NOTE — CONSULT NOTE ADULT - SUBJECTIVE AND OBJECTIVE BOX
CHIEF COMPLAINT:Patient is a 83y old  Male who presents with a chief complaint of     HPI:  82M PMHx of DM presenting with right hip pain s/p mechanical fall today. Describes slipping in the kitchen and falling on his right side w/ right sided hip pain mild. Denies any LOC. Denies any antiplatelet or AC.   pt with hx of htn, ckd, no known hx of cad, who is very active with no cardiac complain ,s/p tripped and fall, pt was admitted to [facility] had a mild elevation of trop with no sig jump , no cpk and MB was drawn.  pt denies of any chest pain.      PAST MEDICAL & SURGICAL HISTORY:  DM (diabetes mellitus)  htn  CHF (congestive heart failure)  CKD  ETOH use    MEDICATIONS  (STANDING):    MEDICATIONS  (PRN):      FAMILY HISTORY:      SOCIAL HISTORY:    [ ] Non-smoker  [x ] Smoker/ electronic cigarette  [x ] Alcohol    Allergies    No Known Allergies    Intolerances    	    REVIEW OF SYSTEMS:  CONSTITUTIONAL: No fever, weight loss, or fatigue  EYES: No eye pain, visual disturbances, or discharge  ENT:  No difficulty hearing, tinnitus, vertigo; No sinus or throat pain  NECK: No pain or stiffness  RESPIRATORY: No cough, wheezing, chills or hemoptysis; No Shortness of Breath  CARDIOVASCULAR: + chest pain, palpitations, passing out, dizziness, or leg swelling  GASTROINTESTINAL: No abdominal or epigastric pain. No nausea, vomiting, or hematemesis; No diarrhea or constipation. No melena or hematochezia.  GENITOURINARY: No dysuria, frequency, hematuria, or incontinence  NEUROLOGICAL: No headaches, memory loss, loss of strength, numbness, or tremors  SKIN: No itching, burning, rashes, or lesions   LYMPH Nodes: No enlarged glands  ENDOCRINE: No heat or cold intolerance; No hair loss  MUSCULOSKELETAL: No joint pain or swelling; No muscle, back, or extremity pain  PSYCHIATRIC: No depression, anxiety, mood swings, or difficulty sleeping  HEME/LYMPH: No easy bruising, or bleeding gums  ALLERGY AND IMMUNOLOGIC: No hives or eczema	    [ ] All others negative	  [x ] Unable to obtain(son / health care proxy at the bedside    PHYSICAL EXAM:  T(C): 36.3 (02-19-23 @ 10:32), Max: 36.8 (02-18-23 @ 18:01)  HR: 85 (02-19-23 @ 10:32) (71 - 110)  BP: 155/88 (02-19-23 @ 10:32) (127/66 - 183/84)  RR: 18 (02-19-23 @ 10:32) (17 - 18)  SpO2: 92% (02-19-23 @ 10:32) (92% - 100%)  Wt(kg): --  I&O's Summary    19 Feb 2023 07:01  -  19 Feb 2023 14:18  --------------------------------------------------------  IN: 300 mL / OUT: 200 mL / NET: 100 mL        Appearance: Normal	  HEENT:   Normal oral mucosa, PERRL, EOMI	  Lymphatic: No lymphadenopathy  Cardiovascular: Normal S1 S2, No JVD, + murmurs, No edema  Respiratory: Lungs clear to auscultation	  Psychiatry: A & O x 3, Mood & affect appropriate  Gastrointestinal:  Soft, Non-tender, + BS	  Skin: No rashes, No ecchymoses, No cyanosis	  Extremities: Normal range of motion, No clubbing, cyanosis or edema  Vascular: Peripheral pulses palpable 2+ bilaterally    TELEMETRY: 	    ECG:  	  RADIOLOGY:  OTHER: 	  	  LABS:	 	    CARDIAC MARKERS:                              11.6   12.24 )-----------( 146      ( 19 Feb 2023 09:47 )             37.9     02-19    139  |  109<H>  |  79<H>  ----------------------------<  237<H>  4.9   |  14<L>  |  4.52<H>    Ca    8.9      19 Feb 2023 09:45  Phos  4.2     02-18  Mg     2.4     02-18      proBNP:   Lipid Profile:   HgA1c:   TSH:   PT/INR - ( 19 Feb 2023 09:46 )   PT: 14.7 sec;   INR: 1.26 ratio         PTT - ( 19 Feb 2023 09:46 )  PTT:28.6 sec    PREVIOUS DIAGNOSTIC TESTING:    < from: 12 Lead ECG (02.19.23 @ 08:16) >  Diagnosis Line SINUS RHYTHM [id number] DEGREE A-V BLOCK  RIGHT BUNDLE BRANCH BLOCK  ABNORMAL ECG  NO PREVIOUS ECGS AVAILABLE    < from: TTE Echo Complete w/o Contrast w/ Doppler (02.17.23 @ 19:36) >   1. Left ventricular ejection fraction, by visual estimation, is 45 to   50%.   2. Mildly to moderately decreased global left ventricular systolic   function.   3. Basal and mid anterior septum, basal inferoseptal segment, and basal   inferior segment are abnormalas described above.   4. There is moderate concentric left ventricular hypertrophy.   5. Prominent septal buldge.   6. Mild mitral annular calcification.   7. Mild mitral valve regurgitation.   8. Mild thickening and calcification of the anterior andposterior   mitral valve leaflets.   9. Moderate tricuspid regurgitation.  10. Sclerotic aortic valve with mildly decreased opening; THERESE 1.5cm2.  11. Mild to moderate pulmonic valve regurgitation.  12. Estimated pulmonary artery systolic pressure is 54.0 mmHg assuming a   right atrial pressure of 5 mmHg, which is consistent with moderate   pulmonary hypertension.    < from: Xray Chest 1 View- PORTABLE-Urgent (Xray Chest 1 View- PORTABLE-Urgent .) (02.16.23 @ 21:31) >  Heart/Vascular: The heart size, mediastinum, hilum and aorta are within   normal limits for projection. Atherosclerotic changes.  Pulmonary: Midline trachea. There is no congestion or effusion.    Bones: There is no fracture. Degenerative changes of the spine.  Lines and catheter: None    Impression:    Airspace disease right upper lobe and question right upper lobe nodule.  Rule out pneumonia.    There is a displaced right femoral neck fracture, acute or subacute.    Continue atorvastatin for lipid lowering.  Maintain carvedilol and hydralazine for blood pressure management.  Continue insulin for sliding scale coverage of diabetes and ceftriaxone for antibiotic coverage.  Check echo to assess LV function and valvular status.  Currently based on significant comorbid conditions and high risk clinical predictors, the patient is at high cardiovascular risk for a poor outcome.  He is scheduled for hemiarthroplasty of the right hip.  Echo is pending. Discussed with patient's son at bedside who understands that his father is at high risk for complications given his significant comorbid conditions.

## 2023-02-19 NOTE — PRE-ANESTHESIA EVALUATION ADULT - NSANTHPMHFT_GEN_ALL_CORE
chart and consultant notes reviewed. pt transferred from [place] following hip fx. hx primarily obtained from chart and son/hcp. pt apparently a+o x 3 at baseline. no known hx of sig cv/pulm dz - ambulates at home without cp/sob per son. elevated troponin @ [place], peaking about ~250 in setting of likely mami on ckd (unclear baseline). tte shows mod reduced ef, elevated pa pressures, and global wall motion abnormalities. ekg rbbb. extensive d/w cv + surgeon  @ bedside, re: ischemia w/u prior to OR. cv + surgeon state that surgery is urgent and no clinical signs active cad/chf at this time, and that ischemia w/u should be deferred in favor of OR/hip repair. ex smoker. dm fs < 200. coivd + with radiographic PNA.

## 2023-02-19 NOTE — CONSULT NOTE ADULT - CONSULT REQUESTED BY NAME
Medicine
Dr. Elliott
dr Callejas
Normal vision: sees adequately in most situations; can see medication labels, newsprint

## 2023-02-19 NOTE — RAPID RESPONSE TEAM SUMMARY - NSSITUATIONBACKGROUNDRRT_GEN_ALL_CORE
82M PMHx of DM presenting with right hip pain s/p mechanical fall.   RRT was called for AMS change. Patient had a transient episode of Afib with HR in 90s on Tele. SBP was in 170s with baseline was 150s. On exam, intact neuro exam: CN II -XII intact, AOx3, motor strength 5/5 on both UEs and LEs. Patient was able to repeat sentences with accuracy, confirmed with son at bedside that speech was similar as for the past several days, and his mental status improved. EKG was repeat showed normal sinus rhythm. Repeat SBP was in 160s. Given Aflutter on Tele prior, Metoprolol tartrate 12.5 mg BID and atorvastatin 40 mg were started.  82M PMHx of DM presenting with right hip pain s/p mechanical fall.   RRT was called for AMS change. Patient had a transient episode of Afib with HR in 90s on Tele. SBP was in 170s with baseline was 150s. On exam, intact neuro exam: CN II -XII intact, AOx3, motor strength 5/5 on both UEs and LEs. Patient was able to repeat sentences with accuracy, confirmed with son at bedside that speech was similar as for the past several days, and his mental status improved. EKG was repeat showed normal sinus rhythm. Repeat SBP was in 160s. Given Aflutter on Tele prior, Metoprolol tartrate 12.5 mg BID and atorvastatin 40 mg were started. Given recent surgery, will defer to primary team about starting AC (patient is on Heparin SQ for DVT ppx). Updated the primary team at bedside.

## 2023-02-19 NOTE — PATIENT PROFILE ADULT - FALL HARM RISK - HARM RISK INTERVENTIONS
Assistance with ambulation/Assistance OOB with selected safe patient handling equipment/Communicate Risk of Fall with Harm to all staff/Discuss with provider need for PT consult/Monitor gait and stability/Provide patient with walking aids - walker, cane, crutches/Reinforce activity limits and safety measures with patient and family/Reorient to person, place and time as needed/Review medications for side effects contributing to fall risk/Sit up slowly, dangle for a short time, stand at bedside before walking/Tailored Fall Risk Interventions/Use of alarms - bed, chair and/or voice tab/Visual Cue: Yellow wristband and red socks/Bed in lowest position, wheels locked, appropriate side rails in place/Call bell, personal items and telephone in reach/Instruct patient to call for assistance before getting out of bed or chair/Non-slip footwear when patient is out of bed/[place] to call system/Physically safe environment - no spills, clutter or unnecessary equipment/Purposeful Proactive Rounding/Room/bathroom lighting operational, light cord in reach

## 2023-02-19 NOTE — CONSULT NOTE ADULT - ASSESSMENT
81 yo male w/ pmhx of CKD, DM, HTN presented to the ED [facility] 2/2 due to fall. Right hip femoral fracture;  2/2 due to fall and NPO On 2/19/2023 for OR for hip surgery. noticed with abn creatinine 4.9 now at 4.4 pt and son state pt does not have ckd however, [place] [facility] [place]. was given a medication does not recall what med was given.      1- RASHAAD on ckd suspected  2- HTN   3- DM   4- hyperlipidemia   5- proteinuria  6- pneumonia    unclear creatinine in past suspect ckd   he is also on farxiga will hold temporarily   hold losartan temporarily   gentle IVF hydration   to have urine pro/cr ratio   ct abd/pelvis did not reveal obstruction   abx per primary team  cpk in range  cont with lipitor   hydralazine to cont as above  to have intact pth with next blood work   trend acidosis if worsens then sodium bicarbonate tabs to be added   pt is for OR for hip surgery   d/w pt and pt son in detail including risk of requiring dialysis with  given pt has quite worse renal function at present. they are aware and understand   d/w Dr. Elliott       
82M PMHx of DM presenting with right hip pain s/p mechanical fall today. Describes slipping in the kitchen and falling on his right side w/ right sided hip pain mild. Denies any LOC. Denies any antiplatelet or AC.   pt with hx of htn, ckd, no known hx of cad, who is very active with no cardiac complain ,s/p tripped and fall, pt was admitted to [facility] had mild elevation of trop with no sig jump , no cpk and MB was drawn. pt with no chest pain.  had a long discussion with son health care proxy the results of blood test, echo discussed with length with him the possibility of sig CAD and risk of MI and death  in addition to other medical co morbidity  renal disease explained to the health care proxy son  and son claims father  can not live with hip fx and not  being able to walk and understands the risk.  continue beta blocker and hydralazine , keep hgb at least >8  observe in pacu post op  avoid excess fluid  asa daily  lipitor  dvt prophylaxis

## 2023-02-20 LAB
ANION GAP SERPL CALC-SCNC: 15 MMOL/L — SIGNIFICANT CHANGE UP (ref 5–17)
APTT BLD: 26.6 SEC — LOW (ref 27.5–35.5)
APTT BLD: 73.6 SEC — HIGH (ref 27.5–35.5)
BUN SERPL-MCNC: 80 MG/DL — HIGH (ref 7–23)
CALCIUM SERPL-MCNC: 8.6 MG/DL — SIGNIFICANT CHANGE UP (ref 8.4–10.5)
CALCIUM SERPL-MCNC: 8.8 MG/DL — SIGNIFICANT CHANGE UP (ref 8.4–10.5)
CHLORIDE SERPL-SCNC: 109 MMOL/L — HIGH (ref 96–108)
CK MB BLD-MCNC: 1.7 % — SIGNIFICANT CHANGE UP (ref 0–3.5)
CK MB BLD-MCNC: 1.7 % — SIGNIFICANT CHANGE UP (ref 0–3.5)
CK MB CFR SERPL CALC: 6.2 NG/ML — SIGNIFICANT CHANGE UP (ref 0–6.7)
CK MB CFR SERPL CALC: 6.3 NG/ML — SIGNIFICANT CHANGE UP (ref 0–6.7)
CK SERPL-CCNC: 363 U/L — HIGH (ref 30–200)
CO2 SERPL-SCNC: 16 MMOL/L — LOW (ref 22–31)
CREAT SERPL-MCNC: 4.47 MG/DL — HIGH (ref 0.5–1.3)
EGFR: 12 ML/MIN/1.73M2 — LOW
GLUCOSE BLDC GLUCOMTR-MCNC: 209 MG/DL — HIGH (ref 70–99)
GLUCOSE BLDC GLUCOMTR-MCNC: 217 MG/DL — HIGH (ref 70–99)
GLUCOSE BLDC GLUCOMTR-MCNC: 220 MG/DL — HIGH (ref 70–99)
GLUCOSE BLDC GLUCOMTR-MCNC: 250 MG/DL — HIGH (ref 70–99)
GLUCOSE SERPL-MCNC: 177 MG/DL — HIGH (ref 70–99)
HCT VFR BLD CALC: 35.4 % — LOW (ref 39–50)
HCT VFR BLD CALC: 38 % — LOW (ref 39–50)
HGB BLD-MCNC: 10.8 G/DL — LOW (ref 13–17)
HGB BLD-MCNC: 11.5 G/DL — LOW (ref 13–17)
INR BLD: 1.21 RATIO — HIGH (ref 0.88–1.16)
MCHC RBC-ENTMCNC: 27.3 PG — SIGNIFICANT CHANGE UP (ref 27–34)
MCHC RBC-ENTMCNC: 27.6 PG — SIGNIFICANT CHANGE UP (ref 27–34)
MCHC RBC-ENTMCNC: 30.3 GM/DL — LOW (ref 32–36)
MCHC RBC-ENTMCNC: 30.5 GM/DL — LOW (ref 32–36)
MCV RBC AUTO: 90.3 FL — SIGNIFICANT CHANGE UP (ref 80–100)
MCV RBC AUTO: 90.3 FL — SIGNIFICANT CHANGE UP (ref 80–100)
NRBC # BLD: 0 /100 WBCS — SIGNIFICANT CHANGE UP (ref 0–0)
NRBC # BLD: 0 /100 WBCS — SIGNIFICANT CHANGE UP (ref 0–0)
PLATELET # BLD AUTO: 169 K/UL — SIGNIFICANT CHANGE UP (ref 150–400)
PLATELET # BLD AUTO: 178 K/UL — SIGNIFICANT CHANGE UP (ref 150–400)
POTASSIUM SERPL-MCNC: 4.8 MMOL/L — SIGNIFICANT CHANGE UP (ref 3.5–5.3)
POTASSIUM SERPL-SCNC: 4.8 MMOL/L — SIGNIFICANT CHANGE UP (ref 3.5–5.3)
PROTHROM AB SERPL-ACNC: 14.1 SEC — HIGH (ref 10.5–13.4)
PTH-INTACT FLD-MCNC: 265 PG/ML — HIGH (ref 15–65)
RBC # BLD: 3.92 M/UL — LOW (ref 4.2–5.8)
RBC # BLD: 4.21 M/UL — SIGNIFICANT CHANGE UP (ref 4.2–5.8)
RBC # FLD: 14.5 % — SIGNIFICANT CHANGE UP (ref 10.3–14.5)
RBC # FLD: 14.6 % — HIGH (ref 10.3–14.5)
SODIUM SERPL-SCNC: 140 MMOL/L — SIGNIFICANT CHANGE UP (ref 135–145)
TROPONIN T, HIGH SENSITIVITY RESULT: 56 NG/L — HIGH (ref 0–51)
WBC # BLD: 13.23 K/UL — HIGH (ref 3.8–10.5)
WBC # BLD: 13.99 K/UL — HIGH (ref 3.8–10.5)
WBC # FLD AUTO: 13.23 K/UL — HIGH (ref 3.8–10.5)
WBC # FLD AUTO: 13.99 K/UL — HIGH (ref 3.8–10.5)

## 2023-02-20 RX ORDER — HYDRALAZINE HCL 50 MG
50 TABLET ORAL THREE TIMES A DAY
Refills: 0 | Status: DISCONTINUED | OUTPATIENT
Start: 2023-02-20 | End: 2023-02-21

## 2023-02-20 RX ORDER — HALOPERIDOL DECANOATE 100 MG/ML
1 INJECTION INTRAMUSCULAR ONCE
Refills: 0 | Status: COMPLETED | OUTPATIENT
Start: 2023-02-20 | End: 2023-02-20

## 2023-02-20 RX ORDER — AZITHROMYCIN 500 MG/1
500 TABLET, FILM COATED ORAL EVERY 24 HOURS
Refills: 0 | Status: DISCONTINUED | OUTPATIENT
Start: 2023-02-20 | End: 2023-02-22

## 2023-02-20 RX ORDER — AMIODARONE HYDROCHLORIDE 400 MG/1
400 TABLET ORAL
Refills: 0 | Status: DISCONTINUED | OUTPATIENT
Start: 2023-02-20 | End: 2023-02-21

## 2023-02-20 RX ORDER — METOPROLOL TARTRATE 50 MG
25 TABLET ORAL
Refills: 0 | Status: DISCONTINUED | OUTPATIENT
Start: 2023-02-20 | End: 2023-02-21

## 2023-02-20 RX ORDER — HEPARIN SODIUM 5000 [USP'U]/ML
4700 INJECTION INTRAVENOUS; SUBCUTANEOUS ONCE
Refills: 0 | Status: COMPLETED | OUTPATIENT
Start: 2023-02-20 | End: 2023-02-20

## 2023-02-20 RX ORDER — HEPARIN SODIUM 5000 [USP'U]/ML
INJECTION INTRAVENOUS; SUBCUTANEOUS
Qty: 25000 | Refills: 0 | Status: DISCONTINUED | OUTPATIENT
Start: 2023-02-20 | End: 2023-02-24

## 2023-02-20 RX ORDER — SODIUM BICARBONATE 1 MEQ/ML
650 SYRINGE (ML) INTRAVENOUS THREE TIMES A DAY
Refills: 0 | Status: DISCONTINUED | OUTPATIENT
Start: 2023-02-20 | End: 2023-02-21

## 2023-02-20 RX ORDER — CEFTRIAXONE 500 MG/1
1000 INJECTION, POWDER, FOR SOLUTION INTRAMUSCULAR; INTRAVENOUS EVERY 24 HOURS
Refills: 0 | Status: DISCONTINUED | OUTPATIENT
Start: 2023-02-20 | End: 2023-02-22

## 2023-02-20 RX ORDER — ASPIRIN/CALCIUM CARB/MAGNESIUM 324 MG
81 TABLET ORAL DAILY
Refills: 0 | Status: DISCONTINUED | OUTPATIENT
Start: 2023-02-20 | End: 2023-02-20

## 2023-02-20 RX ORDER — HEPARIN SODIUM 5000 [USP'U]/ML
4700 INJECTION INTRAVENOUS; SUBCUTANEOUS EVERY 6 HOURS
Refills: 0 | Status: DISCONTINUED | OUTPATIENT
Start: 2023-02-20 | End: 2023-02-24

## 2023-02-20 RX ORDER — CARVEDILOL PHOSPHATE 80 MG/1
25 CAPSULE, EXTENDED RELEASE ORAL EVERY 12 HOURS
Refills: 0 | Status: DISCONTINUED | OUTPATIENT
Start: 2023-02-20 | End: 2023-02-20

## 2023-02-20 RX ADMIN — Medication 100 MILLIGRAM(S): at 05:19

## 2023-02-20 RX ADMIN — HEPARIN SODIUM 5000 UNIT(S): 5000 INJECTION INTRAVENOUS; SUBCUTANEOUS at 05:20

## 2023-02-20 RX ADMIN — Medication 50 MILLIGRAM(S): at 21:45

## 2023-02-20 RX ADMIN — Medication 400 MILLIGRAM(S): at 15:12

## 2023-02-20 RX ADMIN — Medication 650 MILLIGRAM(S): at 21:45

## 2023-02-20 RX ADMIN — Medication 0: at 21:45

## 2023-02-20 RX ADMIN — CEFTRIAXONE 100 MILLIGRAM(S): 500 INJECTION, POWDER, FOR SOLUTION INTRAMUSCULAR; INTRAVENOUS at 05:20

## 2023-02-20 RX ADMIN — Medication 975 MILLIGRAM(S): at 22:14

## 2023-02-20 RX ADMIN — HEPARIN SODIUM 950 UNIT(S)/HR: 5000 INJECTION INTRAVENOUS; SUBCUTANEOUS at 11:30

## 2023-02-20 RX ADMIN — PANTOPRAZOLE SODIUM 40 MILLIGRAM(S): 20 TABLET, DELAYED RELEASE ORAL at 05:21

## 2023-02-20 RX ADMIN — POLYETHYLENE GLYCOL 3350 17 GRAM(S): 17 POWDER, FOR SOLUTION ORAL at 21:44

## 2023-02-20 RX ADMIN — Medication 1000 MILLIGRAM(S): at 15:42

## 2023-02-20 RX ADMIN — HALOPERIDOL DECANOATE 1 MILLIGRAM(S): 100 INJECTION INTRAMUSCULAR at 15:12

## 2023-02-20 RX ADMIN — Medication 975 MILLIGRAM(S): at 21:44

## 2023-02-20 RX ADMIN — ATORVASTATIN CALCIUM 40 MILLIGRAM(S): 80 TABLET, FILM COATED ORAL at 21:45

## 2023-02-20 RX ADMIN — HEPARIN SODIUM 850 UNIT(S)/HR: 5000 INJECTION INTRAVENOUS; SUBCUTANEOUS at 19:19

## 2023-02-20 RX ADMIN — Medication 400 MILLIGRAM(S): at 05:19

## 2023-02-20 RX ADMIN — SENNA PLUS 2 TABLET(S): 8.6 TABLET ORAL at 21:45

## 2023-02-20 RX ADMIN — AZITHROMYCIN 255 MILLIGRAM(S): 500 TABLET, FILM COATED ORAL at 06:33

## 2023-02-20 RX ADMIN — HEPARIN SODIUM 4700 UNIT(S): 5000 INJECTION INTRAVENOUS; SUBCUTANEOUS at 11:33

## 2023-02-20 NOTE — PROGRESS NOTE ADULT - SUBJECTIVE AND OBJECTIVE BOX
[facility]   [phone]  RENAL FOLLOW UP NOTE  --------------------------------------------------------------------------------  Chief Complaint:    24 hour events/subjective:    seen earlier   son at bedside   confused     PAST HISTORY  --------------------------------------------------------------------------------  No significant changes to PMH, PSH, FHx, SHx, unless otherwise noted    ALLERGIES & MEDICATIONS  --------------------------------------------------------------------------------  Allergies    No Known Allergies    Intolerances      Standing Inpatient Medications  acetaminophen     Tablet .. 975 milliGRAM(s) Oral every 8 hours  aMIOdarone    Tablet 400 milliGRAM(s) Oral two times a day  aspirin enteric coated 81 milliGRAM(s) Oral daily  atorvastatin 40 milliGRAM(s) Oral at bedtime  azithromycin  IVPB 500 milliGRAM(s) IV Intermittent every 24 hours  cefTRIAXone   IVPB 1000 milliGRAM(s) IV Intermittent every 24 hours  dextrose 5%. 1000 milliLiter(s) IV Continuous <Continuous>  dextrose 5%. 1000 milliLiter(s) IV Continuous <Continuous>  dextrose 50% Injectable 25 Gram(s) IV Push once  dextrose 50% Injectable 12.5 Gram(s) IV Push once  dextrose 50% Injectable 25 Gram(s) IV Push once  glucagon  Injectable 1 milliGRAM(s) IntraMuscular once  heparin  Infusion.  Unit(s)/Hr IV Continuous <Continuous>  hydrALAZINE 50 milliGRAM(s) Oral three times a day  insulin lispro (ADMELOG) corrective regimen sliding scale   SubCutaneous three times a day before meals  insulin lispro (ADMELOG) corrective regimen sliding scale   SubCutaneous at bedtime  metoprolol tartrate 25 milliGRAM(s) Oral two times a day  multivitamin 1 Tablet(s) Oral daily  pantoprazole    Tablet 40 milliGRAM(s) Oral before breakfast  polyethylene glycol 3350 17 Gram(s) Oral at bedtime  senna 2 Tablet(s) Oral at bedtime  sodium bicarbonate 650 milliGRAM(s) Oral three times a day    PRN Inpatient Medications  dextrose Oral Gel 15 Gram(s) Oral once PRN  heparin   Injectable 4700 Unit(s) IV Push every 6 hours PRN  oxyCODONE    IR 2.5 milliGRAM(s) Oral every 4 hours PRN  oxyCODONE    IR 5 milliGRAM(s) Oral every 4 hours PRN  traMADol 25 milliGRAM(s) Oral every 6 hours PRN      REVIEW OF SYSTEMS  --------------------------------------------------------------------------------        VITALS/PHYSICAL EXAM  --------------------------------------------------------------------------------  T(C): 36.6 (02-20-23 @ 16:30), Max: 36.6 (02-20-23 @ 16:30)  HR: 89 (02-20-23 @ 16:30) (65 - 111)  BP: 149/82 (02-20-23 @ 16:30) (141/84 - 166/93)  RR: 18 (02-20-23 @ 16:30) (18 - 22)  SpO2: 97% (02-20-23 @ 16:30) (95% - 100%)  Wt(kg): --  Height (cm): 172.7 (02-19-23 @ 15:06)  Weight (kg): 77.1 (02-19-23 @ 15:06)  BMI (kg/m2): 25.9 (02-19-23 @ 15:06)  BSA (m2): 1.91 (02-19-23 @ 15:06)      02-19-23 @ 07:01  -  02-20-23 @ 07:00  --------------------------------------------------------  IN: 1425 mL / OUT: 645 mL / NET: 780 mL    02-20-23 @ 07:01  -  02-20-23 @ 18:51  --------------------------------------------------------  IN: 268.5 mL / OUT: 0 mL / NET: 268.5 mL      Physical Exam:  	  confused  no jvd  heart s1/s2 RRR no rub  lungs no rales on ronchi no wheezing   abd + bs soft non tender  ext no edema   moving UE and feet  on command       LABS/STUDIES  --------------------------------------------------------------------------------              10.8   13.23 >-----------<  169      [02-20-23 @ 18:15]              35.4     140  |  109  |  80  ----------------------------<  177      [02-20-23 @ 05:39]  4.8   |  16  |  4.47        Ca     8.8     [02-20-23 @ 05:39]      Mg     2.6     [02-19-23 @ 23:30]      Phos  4.6     [02-19-23 @ 23:30]      PT/INR: PT 14.1 , INR 1.21       [02-20-23 @ 05:39]  PTT: 73.6       [02-20-23 @ 18:15]          [02-20-23 @ 05:39]    Creatinine Trend:  SCr 4.47 [02-20 @ 05:39]  SCr 4.42 [02-19 @ 23:30]  SCr 4.43 [02-19 @ 17:19]  SCr 4.52 [02-19 @ 09:45]  SCr 4.46 [02-18 @ 07:05]              Urinalysis - [02-16-23 @ 22:15]      Color Yellow / Appearance Clear / SG 1.015 / pH 6.0      Gluc 1000 / Ketone Trace  / Bili Negative / Urobili Negative       Blood Small / Protein 500 / Leuk Est Negative / Nitrite Negative      RBC 0-2 / WBC Negative / Hyaline  / Gran  / Sq Epi  / Non Sq Epi Occasional / Bacteria Occasional      PTH -- (Ca 8.6)      [02-20-23 @ 05:39]   265

## 2023-02-20 NOTE — OCCUPATIONAL THERAPY INITIAL EVALUATION ADULT - ADL RETRAINING, OT EVAL
Patient will dress lower body (I), AE as needed in 4 weeks Patient will dress lower body (I), AE as needed in 4 weeks. GOAL: Pt will be independent in all toileting tasks within 4 weeks. GOAL: Pt will be independent with all bathing tasks within 4 weeks.

## 2023-02-20 NOTE — PHYSICAL THERAPY INITIAL EVALUATION ADULT - LEVEL OF INDEPENDENCE: SIT/STAND, REHAB EVAL
pt extremely retropulsive and with poor trunk control. Agitated throughout session, not appropriate for OOB activity at this time/unable to perform
yes

## 2023-02-20 NOTE — OCCUPATIONAL THERAPY INITIAL EVALUATION ADULT - PERTINENT HX OF CURRENT PROBLEM, REHAB EVAL
83yMale c/o R hip pain s/p mechanical fall. Unable to bear weight in the RLE since the fall. Denies headstrike or LOC. Denies numbness/tingling in the RLE. Denies any other trauma/injuries at this time. S/p R hemiarthroplasty    L hip 2/16-Minimally displaced femoral neck fracture

## 2023-02-20 NOTE — PHYSICAL THERAPY INITIAL EVALUATION ADULT - TRANSFER TRAINING, PT EVAL
GOAL: Pt will perform sit to/from stand transfers independently with least restrictive assistive device within 3-4weeks.

## 2023-02-20 NOTE — OCCUPATIONAL THERAPY INITIAL EVALUATION ADULT - RANGE OF MOTION EXAMINATION, LOWER EXTREMITY
Left LE Active ROM was WFL (within functional limits)/Right LE Active Assistive ROM was WFL  (within functional limits) Left LE Active ROM was WFL (within functional limits)/bilateral LE Active ROM was WFL  (within functional limits)

## 2023-02-20 NOTE — PHYSICAL THERAPY INITIAL EVALUATION ADULT - ADDITIONAL COMMENTS
Patient lives with son in 2nd floor family home with 10-12 steps to enter. PTA, pt was independent with use of cane.

## 2023-02-20 NOTE — OCCUPATIONAL THERAPY INITIAL EVALUATION ADULT - VISUAL ASSESSMENT: VISUAL NEGLECT
Yes, we can use the one scheduled for June for his May 8th dose. Then we will have to arrange for him to have his next dose in June.     thanks   none not observed

## 2023-02-20 NOTE — OCCUPATIONAL THERAPY INITIAL EVALUATION ADULT - PLANNED THERAPY INTERVENTIONS, OT EVAL
ADL retraining/balance training/bed mobility training/cognitive, visual perceptual/ROM/strengthening/transfer training ADL retraining/balance training/bed mobility training/cognitive, visual perceptual/strengthening/transfer training

## 2023-02-20 NOTE — PROGRESS NOTE ADULT - ASSESSMENT
83 yo male w/ pmhx of CKD, DM, HTN presented to the ED [facility] 2/2 due to fall.        Right hip femoral fracture: due to fall  - consent obtained by ortho with son to procedure  - recommend Cardiology consult, given  elevated troponin, elevated BNP  - intermediate risk surgery  - Given patient age and as well as comorbidities, patient is high risk for postoperative complication.  - revised cardiac risk 2 points, 10.1 % 30 days risk of death, MI or cardiac arrest .     Overall is high risk for surgery. Stable for OR today. Medically optimized.     Pneumonia: noted on CXR, c/w Rocephin as well as azithromycin . CT chest tomorrow.      COVID- 19 :  No evidence of Hypoxia, no treatment needed.     Delirium: multifactorial in the acute setting from Hip fracture as well as PNA, frequent orientation to time and place, UA not suggestive for UTI    Elevated Troponin; continue to trend, unable to assess if any chest pain, patient is currently confused  - TTE decreased EF 40 %. EKG notes inferior and septal MI. CHF, euvolemic. Will ask Cardio to follow here as well.     DM: stable, hold all meds. SSC.     Essential HTN: resume home medication with holding parameters.     Renal: Creatinine 4.46. Renal consult was called this AM. Dr. Cook will see. Renal sonogram Monday. Consider bicarb and Phoslo.     CKD V appears chronic. Has outside nephrologist.     Discussed with son today at bedside, aware of operative risks.        83 yo male w/ pmhx of CKD, DM, HTN presented to the ED [facility] 2/2 due to fall.        Right hip femoral fracture: due to fall  - consent obtained by ortho with son to procedure  - recommend Cardiology consult, given  elevated troponin, elevated BNP    Had right hip surgery. Stable in bed. Agree with CT chest.  [place]: noted on CXR, c/w Rocephin as well as azithromycin . CT chest tomorrow.      COVID- 19 :  No evidence of Hypoxia, no treatment needed.     Delirium: multifactorial in the acute setting from Hip fracture as well as PNA, frequent orientation to time and place, UA not suggestive for UTI    Elevated Troponin; continue to trend, unable to assess if any chest pain, patient is currently confused. TTE decreased EF 40 %. EKG notes inferior and septal MI. CHF, euvolemic. Will ask Cardio to follow here as well.     DM: stable, hold all meds. SSC.     Essential HTN: resume home medication hydralazine and lopressor.      Renal: Creatinine 4.46. Renal consult was called this AM. Dr. Cook will see. Renal sonogram Monday. Consider bicarb and Phoslo.     CKD V appears chronic. Has outside nephrologist.     Stable on tele, PT eval.

## 2023-02-20 NOTE — PROGRESS NOTE ADULT - SUBJECTIVE AND OBJECTIVE BOX
CARDIOLOGY     PROGRESS  NOTE   ________________________________________________    CHIEF COMPLAINT:Patient is a 83y old  Male who presents with a chief complaint of hip fx (19 Feb 2023 14:17)  no chest pain  	  REVIEW OF SYSTEMS:  CONSTITUTIONAL: No fever, weight loss, or fatigue  EYES: No eye pain, visual disturbances, or discharge  ENT:  No difficulty hearing, tinnitus, vertigo; No sinus or throat pain  NECK: No pain or stiffness  RESPIRATORY: No cough, wheezing, chills or hemoptysis; + Shortness of Breath  CARDIOVASCULAR: No chest pain, palpitations, passing out, dizziness, or leg swelling  GASTROINTESTINAL: No abdominal or epigastric pain. No nausea, vomiting, or hematemesis; No diarrhea or constipation. No melena or hematochezia.  GENITOURINARY: No dysuria, frequency, hematuria, or incontinence  NEUROLOGICAL: No headaches, memory loss, loss of strength, numbness, or tremors  SKIN: No itching, burning, rashes, or lesions   LYMPH Nodes: No enlarged glands  ENDOCRINE: No heat or cold intolerance; No hair loss  MUSCULOSKELETAL: No joint pain or swelling; No muscle, back, or extremity pain, + hip pain  PSYCHIATRIC: No depression, anxiety, mood swings, or difficulty sleeping  HEME/LYMPH: No easy bruising, or bleeding gums  ALLERGY AND IMMUNOLOGIC: No hives or eczema	    [x ] All others negative	  [ ] Unable to obtain    PHYSICAL EXAM:  T(C): 36.3 (02-20-23 @ 08:31), Max: 36.4 (02-19-23 @ 22:05)  HR: 96 (02-20-23 @ 08:31) (63 - 100)  BP: 150/90 (02-20-23 @ 08:31) (141/84 - 171/89)  RR: 18 (02-20-23 @ 08:31) (16 - 18)  SpO2: 95% (02-20-23 @ 08:31) (92% - 100%)  Wt(kg): --  I&O's Summary    19 Feb 2023 07:01  -  20 Feb 2023 07:00  --------------------------------------------------------  IN: 1425 mL / OUT: 645 mL / NET: 780 mL        Appearance: Normal	  HEENT:   Normal oral mucosa, PERRL, EOMI	  Lymphatic: No lymphadenopathy  Cardiovascular: Normal S1 S2, No JVD, + murmurs, No edema  Respiratory: rhonchi  Psychiatry: A & O x 3, Mood & affect appropriate  Gastrointestinal:  Soft, Non-tender, + BS	  Skin: No rashes, No ecchymoses, No cyanosis	  Neurologic: Non-focal  Extremities: Normal range of motion, No clubbing, cyanosis or edema  Vascular: Peripheral pulses palpable 2+ bilaterally    MEDICATIONS  (STANDING):  acetaminophen     Tablet .. 975 milliGRAM(s) Oral every 8 hours  acetaminophen   IVPB .. 1000 milliGRAM(s) IV Intermittent once  atorvastatin 40 milliGRAM(s) Oral at bedtime  azithromycin  IVPB 500 milliGRAM(s) IV Intermittent every 24 hours  cefTRIAXone   IVPB 1000 milliGRAM(s) IV Intermittent every 24 hours  dextrose 5%. 1000 milliLiter(s) (100 mL/Hr) IV Continuous <Continuous>  dextrose 5%. 1000 milliLiter(s) (50 mL/Hr) IV Continuous <Continuous>  dextrose 50% Injectable 25 Gram(s) IV Push once  dextrose 50% Injectable 12.5 Gram(s) IV Push once  dextrose 50% Injectable 25 Gram(s) IV Push once  glucagon  Injectable 1 milliGRAM(s) IntraMuscular once  heparin   Injectable 5000 Unit(s) SubCutaneous every 12 hours  insulin lispro (ADMELOG) corrective regimen sliding scale   SubCutaneous three times a day before meals  insulin lispro (ADMELOG) corrective regimen sliding scale   SubCutaneous at bedtime  metoprolol tartrate 12.5 milliGRAM(s) Oral two times a day  multivitamin 1 Tablet(s) Oral daily  pantoprazole    Tablet 40 milliGRAM(s) Oral before breakfast  polyethylene glycol 3350 17 Gram(s) Oral at bedtime  senna 2 Tablet(s) Oral at bedtime  sodium chloride 0.9%. 1000 milliLiter(s) (75 mL/Hr) IV Continuous <Continuous>      TELEMETRY: 	    ECG:  	  RADIOLOGY:  OTHER: 	  	  LABS:	 	    CARDIAC MARKERS:  CARDIAC MARKERS ( 20 Feb 2023 05:39 )  x     / x     / 363 U/L / x     / 6.2 ng/mL  CARDIAC MARKERS ( 19 Feb 2023 23:30 )  x     / x     / 369 U/L / x     / 6.3 ng/mL  CARDIAC MARKERS ( 19 Feb 2023 17:19 )  x     / x     / 207 U/L / x     / 4.3 ng/mL                                11.5   13.99 )-----------( 178      ( 20 Feb 2023 05:39 )             38.0     02-20    140  |  109<H>  |  80<H>  ----------------------------<  177<H>  4.8   |  16<L>  |  4.47<H>    Ca    8.8      20 Feb 2023 05:39  Phos  4.6     02-19  Mg     2.6     02-19      proBNP: Serum Pro-Brain Natriuretic Peptide: 6136 pg/mL (02-17 @ 02:45)    Lipid Profile:   HgA1c:   TSH:   PT/INR - ( 20 Feb 2023 05:39 )   PT: 14.1 sec;   INR: 1.21 ratio         PTT - ( 20 Feb 2023 05:39 )  PTT:26.6 sec  < from: 12 Lead ECG (02.19.23 @ 08:16) >  Diagnosis Line SINUS RHYTHM [id number] DEGREE A-V BLOCK  RIGHT BUNDLE BRANCH BLOCK  ABNORMAL ECG  NO PREVIOUS ECGS AVAILABLE    1- RASHAAD on ckd suspected  2- HTN   3- DM   4- hyperlipidemia   5- proteinuria  6- pneumonia    unclear creatinine in past suspect ckd   he is also on farxiga will hold temporarily   hold losartan temporarily   gentle IVF hydration   to have urine pro/cr ratio   ct abd/pelvis did not reveal obstruction   abx per primary team  cpk in range  cont with lipitor   hydralazine to cont as above  to have intact pth with next blood work   trend acidosis if worsens then sodium bicarbonate tabs to be added   pt is for OR for hip surgery   d/w pt and pt son in detail including risk of requiring dialysis with  given pt has quite worse renal function at present. they are aware and understand   d/w Dr. Elliott     Assessment and plan  ---------------------------  82M PMHx of DM presenting with right hip pain s/p mechanical fall today. Describes slipping in the kitchen and falling on his right side w/ right sided hip pain mild. Denies any LOC. Denies any antiplatelet or AC.   pt with hx of htn, ckd, no known hx of cad, who is very active with no cardiac complain ,s/p tripped and fall, pt was admitted to [facility] had mild elevation of trop with no sig jump , no cpk and MB was drawn. pt with no chest pain.  had a long discussion with son health care proxy the results of blood test, echo discussed with length with him the possibility of sig CAD and risk of MI and death  in addition to other medical co morbidity  renal disease explained to the health care proxy son  and son claims father  can not live with hip fx and not  being able to walk and understands the risk.  continue beta blocker and hydralazine , keep hgb at least >8  increase beta blocker to 25 mg po bid  PAF/ flutter in flutter now start on amio try to keep in nsr  AC  avoid excess fluid  asa daily  lipitor  dc ivf  renal may need HD in near future    	                    CARDIOLOGY     PROGRESS  NOTE   ________________________________________________    CHIEF COMPLAINT:Patient is a 83y old  Male who presents with a chief complaint of hip fx (19 Feb 2023 14:17)  no chest pain/ events noted i was not notified  	  REVIEW OF SYSTEMS:  CONSTITUTIONAL: No fever, weight loss, or fatigue  EYES: No eye pain, visual disturbances, or discharge  ENT:  No difficulty hearing, tinnitus, vertigo; No sinus or throat pain  NECK: No pain or stiffness  RESPIRATORY: No cough, wheezing, chills or hemoptysis; + Shortness of Breath  CARDIOVASCULAR: No chest pain, palpitations, passing out, dizziness, or leg swelling  GASTROINTESTINAL: No abdominal or epigastric pain. No nausea, vomiting, or hematemesis; No diarrhea or constipation. No melena or hematochezia.  GENITOURINARY: No dysuria, frequency, hematuria, or incontinence  NEUROLOGICAL: No headaches, memory loss, loss of strength, numbness, or tremors  SKIN: No itching, burning, rashes, or lesions   LYMPH Nodes: No enlarged glands  ENDOCRINE: No heat or cold intolerance; No hair loss  MUSCULOSKELETAL: No joint pain or swelling; No muscle, back, or extremity pain, + hip pain  PSYCHIATRIC: No depression, anxiety, mood swings, or difficulty sleeping  HEME/LYMPH: No easy bruising, or bleeding gums  ALLERGY AND IMMUNOLOGIC: No hives or eczema	    [x ] All others negative	  [ ] Unable to obtain    PHYSICAL EXAM:  T(C): 36.3 (02-20-23 @ 08:31), Max: 36.4 (02-19-23 @ 22:05)  HR: 96 (02-20-23 @ 08:31) (63 - 100)  BP: 150/90 (02-20-23 @ 08:31) (141/84 - 171/89)  RR: 18 (02-20-23 @ 08:31) (16 - 18)  SpO2: 95% (02-20-23 @ 08:31) (92% - 100%)  Wt(kg): --  I&O's Summary    19 Feb 2023 07:01  -  20 Feb 2023 07:00  --------------------------------------------------------  IN: 1425 mL / OUT: 645 mL / NET: 780 mL        Appearance: Normal	  HEENT:   Normal oral mucosa, PERRL, EOMI	  Lymphatic: No lymphadenopathy  Cardiovascular: Normal S1 S2, No JVD, + murmurs, No edema  Respiratory: rhonchi  Psychiatry: A & O x 3, Mood & affect appropriate  Gastrointestinal:  Soft, Non-tender, + BS	  Skin: No rashes, No ecchymoses, No cyanosis	  Neurologic: Non-focal  Extremities: Normal range of motion, No clubbing, cyanosis or edema  Vascular: Peripheral pulses palpable 2+ bilaterally    MEDICATIONS  (STANDING):  acetaminophen     Tablet .. 975 milliGRAM(s) Oral every 8 hours  acetaminophen   IVPB .. 1000 milliGRAM(s) IV Intermittent once  atorvastatin 40 milliGRAM(s) Oral at bedtime  azithromycin  IVPB 500 milliGRAM(s) IV Intermittent every 24 hours  cefTRIAXone   IVPB 1000 milliGRAM(s) IV Intermittent every 24 hours  dextrose 5%. 1000 milliLiter(s) (100 mL/Hr) IV Continuous <Continuous>  dextrose 5%. 1000 milliLiter(s) (50 mL/Hr) IV Continuous <Continuous>  dextrose 50% Injectable 25 Gram(s) IV Push once  dextrose 50% Injectable 12.5 Gram(s) IV Push once  dextrose 50% Injectable 25 Gram(s) IV Push once  glucagon  Injectable 1 milliGRAM(s) IntraMuscular once  heparin   Injectable 5000 Unit(s) SubCutaneous every 12 hours  insulin lispro (ADMELOG) corrective regimen sliding scale   SubCutaneous three times a day before meals  insulin lispro (ADMELOG) corrective regimen sliding scale   SubCutaneous at bedtime  metoprolol tartrate 12.5 milliGRAM(s) Oral two times a day  multivitamin 1 Tablet(s) Oral daily  pantoprazole    Tablet 40 milliGRAM(s) Oral before breakfast  polyethylene glycol 3350 17 Gram(s) Oral at bedtime  senna 2 Tablet(s) Oral at bedtime  sodium chloride 0.9%. 1000 milliLiter(s) (75 mL/Hr) IV Continuous <Continuous>      TELEMETRY: 	    ECG:  	  RADIOLOGY:  OTHER: 	  	  LABS:	 	    CARDIAC MARKERS:  CARDIAC MARKERS ( 20 Feb 2023 05:39 )  x     / x     / 363 U/L / x     / 6.2 ng/mL  CARDIAC MARKERS ( 19 Feb 2023 23:30 )  x     / x     / 369 U/L / x     / 6.3 ng/mL  CARDIAC MARKERS ( 19 Feb 2023 17:19 )  x     / x     / 207 U/L / x     / 4.3 ng/mL                                11.5   13.99 )-----------( 178      ( 20 Feb 2023 05:39 )             38.0     02-20    140  |  109<H>  |  80<H>  ----------------------------<  177<H>  4.8   |  16<L>  |  4.47<H>    Ca    8.8      20 Feb 2023 05:39  Phos  4.6     02-19  Mg     2.6     02-19      proBNP: Serum Pro-Brain Natriuretic Peptide: 6136 pg/mL (02-17 @ 02:45)    Lipid Profile:   HgA1c:   TSH:   PT/INR - ( 20 Feb 2023 05:39 )   PT: 14.1 sec;   INR: 1.21 ratio         PTT - ( 20 Feb 2023 05:39 )  PTT:26.6 sec  < from: 12 Lead ECG (02.19.23 @ 08:16) >  Diagnosis Line SINUS RHYTHM [id number] DEGREE A-V BLOCK  RIGHT BUNDLE BRANCH BLOCK  ABNORMAL ECG  NO PREVIOUS ECGS AVAILABLE    1- RASHAAD on ckd suspected  2- HTN   3- DM   4- hyperlipidemia   5- proteinuria  6- pneumonia    unclear creatinine in past suspect ckd   he is also on farxiga will hold temporarily   hold losartan temporarily   gentle IVF hydration   to have urine pro/cr ratio   ct abd/pelvis did not reveal obstruction   abx per primary team  cpk in range  cont with lipitor   hydralazine to cont as above  to have intact pth with next blood work   trend acidosis if worsens then sodium bicarbonate tabs to be added   pt is for OR for hip surgery   d/w pt and pt son in detail including risk of requiring dialysis with  given pt has quite worse renal function at present. they are aware and understand   d/w Dr. Elliott     Assessment and plan  ---------------------------  82M PMHx of DM presenting with right hip pain s/p mechanical fall today. Describes slipping in the kitchen and falling on his right side w/ right sided hip pain mild. Denies any LOC. Denies any antiplatelet or AC.   pt with hx of htn, ckd, no known hx of cad, who is very active with no cardiac complain ,s/p tripped and fall, pt was admitted to [facility] had mild elevation of trop with no sig jump , no cpk and MB was drawn. pt with no chest pain.  had a long discussion with son health care proxy the results of blood test, echo discussed with length with him the possibility of sig CAD and risk of MI and death  in addition to other medical co morbidity  renal disease explained to the health care proxy son  and son claims father  can not live with hip fx and not  being able to walk and understands the risk.  continue beta blocker and hydralazine , keep hgb at least >8  increase beta blocker to 25 mg po bid  PAF/ flutter in flutter last night  start on amio try to keep in nsr  AC if no contraindication from ortho  asa daily  lipitor  dc ivf  renal may need HD in near future

## 2023-02-20 NOTE — PROGRESS NOTE ADULT - SUBJECTIVE AND OBJECTIVE BOX
INTERVAL HPI/OVERNIGHT EVENTS:  Pt seen and examined at bedside.     Allergies/Intolerance: No Known Allergies      MEDICATIONS  (STANDING):  acetaminophen     Tablet .. 975 milliGRAM(s) Oral every 8 hours  acetaminophen   IVPB .. 1000 milliGRAM(s) IV Intermittent once  aMIOdarone    Tablet 400 milliGRAM(s) Oral two times a day  atorvastatin 40 milliGRAM(s) Oral at bedtime  azithromycin  IVPB 500 milliGRAM(s) IV Intermittent every 24 hours  cefTRIAXone   IVPB 1000 milliGRAM(s) IV Intermittent every 24 hours  dextrose 5%. 1000 milliLiter(s) (100 mL/Hr) IV Continuous <Continuous>  dextrose 5%. 1000 milliLiter(s) (50 mL/Hr) IV Continuous <Continuous>  dextrose 50% Injectable 25 Gram(s) IV Push once  dextrose 50% Injectable 12.5 Gram(s) IV Push once  dextrose 50% Injectable 25 Gram(s) IV Push once  glucagon  Injectable 1 milliGRAM(s) IntraMuscular once  heparin   Injectable 5000 Unit(s) SubCutaneous every 12 hours  hydrALAZINE 50 milliGRAM(s) Oral three times a day  insulin lispro (ADMELOG) corrective regimen sliding scale   SubCutaneous three times a day before meals  insulin lispro (ADMELOG) corrective regimen sliding scale   SubCutaneous at bedtime  metoprolol tartrate 25 milliGRAM(s) Oral two times a day  multivitamin 1 Tablet(s) Oral daily  pantoprazole    Tablet 40 milliGRAM(s) Oral before breakfast  polyethylene glycol 3350 17 Gram(s) Oral at bedtime  senna 2 Tablet(s) Oral at bedtime    MEDICATIONS  (PRN):  dextrose Oral Gel 15 Gram(s) Oral once PRN Blood Glucose LESS THAN 70 milliGRAM(s)/deciliter  oxyCODONE    IR 2.5 milliGRAM(s) Oral every 4 hours PRN Moderate Pain (4 - 6)  oxyCODONE    IR 5 milliGRAM(s) Oral every 4 hours PRN Severe Pain (7 - 10)  traMADol 25 milliGRAM(s) Oral every 6 hours PRN Mild Pain (1 - 3)        ROS: all systems reviewed and wnl      PHYSICAL EXAMINATION:  Vital Signs Last 24 Hrs  T(C): 36.3 (20 Feb 2023 08:31), Max: 36.4 (19 Feb 2023 22:05)  T(F): 97.4 (20 Feb 2023 08:31), Max: 97.6 (20 Feb 2023 01:08)  HR: 96 (20 Feb 2023 08:31) (63 - 100)  BP: 150/90 (20 Feb 2023 08:31) (141/84 - 171/89)  BP(mean): 103 (19 Feb 2023 18:30) (101 - 126)  RR: 18 (20 Feb 2023 08:31) (16 - 18)  SpO2: 95% (20 Feb 2023 08:31) (92% - 100%)    Parameters below as of 20 Feb 2023 08:31  Patient On (Oxygen Delivery Method): room air      CAPILLARY BLOOD GLUCOSE      POCT Blood Glucose.: 220 mg/dL (20 Feb 2023 08:17)  POCT Blood Glucose.: 97 mg/dL (19 Feb 2023 20:45)  POCT Blood Glucose.: 169 mg/dL (19 Feb 2023 17:08)  POCT Blood Glucose.: 198 mg/dL (19 Feb 2023 10:22)      02-19 @ 07:01  -  02-20 @ 07:00  --------------------------------------------------------  IN: 1425 mL / OUT: 645 mL / NET: 780 mL    02-20 @ 07:01  -  02-20 @ 10:03  --------------------------------------------------------  IN: 240 mL / OUT: 0 mL / NET: 240 mL        GENERAL:   NECK: supple, No JVD  CHEST/LUNG: clear to auscultation bilaterally; no rales, rhonchi, or wheezing b/l  HEART: normal S1, S2  ABDOMEN: BS+, soft, ND, NT   EXTREMITIES:  pulses palpable; no clubbing, cyanosis, or edema b/l LEs  SKIN: no rashes or lesions      LABS:                        11.5   13.99 )-----------( 178      ( 20 Feb 2023 05:39 )             38.0     02-20    140  |  109<H>  |  80<H>  ----------------------------<  177<H>  4.8   |  16<L>  |  4.47<H>    Ca    8.8      20 Feb 2023 05:39  Phos  4.6     02-19  Mg     2.6     02-19      PT/INR - ( 20 Feb 2023 05:39 )   PT: 14.1 sec;   INR: 1.21 ratio         PTT - ( 20 Feb 2023 05:39 )  PTT:26.6 sec           INTERVAL HPI/OVERNIGHT EVENTS:  Pt seen and examined at bedside.     Allergies/Intolerance: No Known Allergies      MEDICATIONS  (STANDING):  acetaminophen     Tablet .. 975 milliGRAM(s) Oral every 8 hours  acetaminophen   IVPB .. 1000 milliGRAM(s) IV Intermittent once  aMIOdarone    Tablet 400 milliGRAM(s) Oral two times a day  atorvastatin 40 milliGRAM(s) Oral at bedtime  azithromycin  IVPB 500 milliGRAM(s) IV Intermittent every 24 hours  cefTRIAXone   IVPB 1000 milliGRAM(s) IV Intermittent every 24 hours  dextrose 5%. 1000 milliLiter(s) (100 mL/Hr) IV Continuous <Continuous>  dextrose 5%. 1000 milliLiter(s) (50 mL/Hr) IV Continuous <Continuous>  dextrose 50% Injectable 25 Gram(s) IV Push once  dextrose 50% Injectable 12.5 Gram(s) IV Push once  dextrose 50% Injectable 25 Gram(s) IV Push once  glucagon  Injectable 1 milliGRAM(s) IntraMuscular once  heparin   Injectable 5000 Unit(s) SubCutaneous every 12 hours  hydrALAZINE 50 milliGRAM(s) Oral three times a day  insulin lispro (ADMELOG) corrective regimen sliding scale   SubCutaneous three times a day before meals  insulin lispro (ADMELOG) corrective regimen sliding scale   SubCutaneous at bedtime  metoprolol tartrate 25 milliGRAM(s) Oral two times a day  multivitamin 1 Tablet(s) Oral daily  pantoprazole    Tablet 40 milliGRAM(s) Oral before breakfast  polyethylene glycol 3350 17 Gram(s) Oral at bedtime  senna 2 Tablet(s) Oral at bedtime    MEDICATIONS  (PRN):  dextrose Oral Gel 15 Gram(s) Oral once PRN Blood Glucose LESS THAN 70 milliGRAM(s)/deciliter  oxyCODONE    IR 2.5 milliGRAM(s) Oral every 4 hours PRN Moderate Pain (4 - 6)  oxyCODONE    IR 5 milliGRAM(s) Oral every 4 hours PRN Severe Pain (7 - 10)  traMADol 25 milliGRAM(s) Oral every 6 hours PRN Mild Pain (1 - 3)        ROS: all systems reviewed and wnl      PHYSICAL EXAMINATION:  Vital Signs Last 24 Hrs  T(C): 36.3 (20 Feb 2023 08:31), Max: 36.4 (19 Feb 2023 22:05)  T(F): 97.4 (20 Feb 2023 08:31), Max: 97.6 (20 Feb 2023 01:08)  HR: 96 (20 Feb 2023 08:31) (63 - 100)  BP: 150/90 (20 Feb 2023 08:31) (141/84 - 171/89)  BP(mean): 103 (19 Feb 2023 18:30) (101 - 126)  RR: 18 (20 Feb 2023 08:31) (16 - 18)  SpO2: 95% (20 Feb 2023 08:31) (92% - 100%)    Parameters below as of 20 Feb 2023 08:31  Patient On (Oxygen Delivery Method): room air      CAPILLARY BLOOD GLUCOSE      POCT Blood Glucose.: 220 mg/dL (20 Feb 2023 08:17)  POCT Blood Glucose.: 97 mg/dL (19 Feb 2023 20:45)  POCT Blood Glucose.: 169 mg/dL (19 Feb 2023 17:08)  POCT Blood Glucose.: 198 mg/dL (19 Feb 2023 10:22)      02-19 @ 07:01  -  02-20 @ 07:00  --------------------------------------------------------  IN: 1425 mL / OUT: 645 mL / NET: 780 mL    02-20 @ 07:01  -  02-20 @ 10:03  --------------------------------------------------------  IN: 240 mL / OUT: 0 mL / NET: 240 mL        GENERAL: in chair, stable on NC, no CP or fevers.   NECK: supple, No JVD  CHEST/LUNG: clear to auscultation bilaterally; no rales, rhonchi, or wheezing b/l  HEART: normal S1, S2  ABDOMEN: BS+, soft, ND, NT   EXTREMITIES:  pulses palpable; no clubbing, cyanosis, or edema b/l LEs  SKIN: no rashes or lesions      LABS:                        11.5   13.99 )-----------( 178      ( 20 Feb 2023 05:39 )             38.0     02-20    140  |  109<H>  |  80<H>  ----------------------------<  177<H>  4.8   |  16<L>  |  4.47<H>    Ca    8.8      20 Feb 2023 05:39  Phos  4.6     02-19  Mg     2.6     02-19      PT/INR - ( 20 Feb 2023 05:39 )   PT: 14.1 sec;   INR: 1.21 ratio         PTT - ( 20 Feb 2023 05:39 )  PTT:26.6 sec

## 2023-02-20 NOTE — PROGRESS NOTE ADULT - SUBJECTIVE AND OBJECTIVE BOX
Orthopaedic Surgery Progress Note    Subjective:   Patient seen and examined. RRT called overnight for AMS/new Afib, see associated note. This AM, states pain is controlled. Denies fever/chills/chest pain/shortness of breath/numbness/tingling.    Objective:  T(C): 36.4 (02-20-23 @ 01:08), Max: 36.7 (02-19-23 @ 06:10)  HR: 98 (02-20-23 @ 01:08) (63 - 98)  BP: 159/91 (02-20-23 @ 01:08) (127/66 - 171/89)  RR: 18 (02-20-23 @ 01:08) (16 - 18)  SpO2: 98% (02-20-23 @ 01:08) (92% - 100%)  Wt(kg): --    02-19 @ 07:01  -  02-20 @ 03:39  --------------------------------------------------------  IN: 525 mL / OUT: 395 mL / NET: 130 mL        Physical Exam:    Gen: awake, alert, NAD  Resp: no increased work of breathing  RLE:  Aquacel dressing c/d/i   +EHL/FHL/TA/GS  SILT S/S/SP/DP  +DP/PT Pulses  Compartments soft  No calf TTP                           9.8    11.22 )-----------( 141      ( 19 Feb 2023 17:19 )             32.2     02-19    142  |  111<H>  |  79<H>  ----------------------------<  128<H>  4.6   |  17<L>  |  4.42<H>    Ca    8.7      19 Feb 2023 23:30  Phos  4.6     02-19  Mg     2.6     02-19      PT/INR - ( 19 Feb 2023 23:30 )   PT: 13.8 sec;   INR: 1.19 ratio         PTT - ( 19 Feb 2023 23:30 )  PTT:27.9 sec

## 2023-02-20 NOTE — PHYSICAL THERAPY INITIAL EVALUATION ADULT - RANGE OF MOTION EXAMINATION, REHAB EVAL
difficulty formally assessing 2/2 pt agitated/bilateral upper extremity ROM was WFL (within functional limits)/bilateral lower extremity ROM was WFL (within functional limits)

## 2023-02-20 NOTE — PHYSICAL THERAPY INITIAL EVALUATION ADULT - PERTINENT HX OF CURRENT PROBLEM, REHAB EVAL
83 yo male w/ pmhx of CKD, DM, HTN presented to the ED [facility] 2/2 due to fall. The patient was at a gas station ambulating, slipped and fell landed on this right hip. Upon arrival to the ED, patient was noted to have COVID-19 PNA, as well as right minimally displaced hip fracture. Noted to have elevated BNP, trop x1 elevated. CXR performed showed evidence of Right upper lobe opacities.    Pt now s/p R hip hemiarthroplasty.

## 2023-02-20 NOTE — OCCUPATIONAL THERAPY INITIAL EVALUATION ADULT - IMPAIRED TRANSFERS: BED/CHAIR, REHAB EVAL
2% wipes/To be discontinued when line removed impaired balance/impaired postural control/decreased strength

## 2023-02-20 NOTE — PHYSICAL THERAPY INITIAL EVALUATION ADULT - GAIT TRAINING, PT EVAL
GOAL: Pt will ambulate 150' independently with least restrictive assistiev device as needed in 3-4wks.

## 2023-02-20 NOTE — OCCUPATIONAL THERAPY INITIAL EVALUATION ADULT - LIVES WITH, PROFILE
Pt lives with son in a pvt home, 12 steps to enter with handrail. (I) ADLs and functional transfers. Owns straight cane, not used. +Tub/children

## 2023-02-20 NOTE — PROGRESS NOTE ADULT - ASSESSMENT
81 yo male w/ pmhx of CKD, DM, HTN presented to the ED [facility] 2/2 due to fall. Right hip femoral fracture;  2/2 due to fall and NPO On 2/19/2023 for OR for hip surgery. noticed with abn creatinine 4.9 now at 4.4 pt and son state pt does not have ckd however, saw a nephrologist in [facility] [place] last year once only. was given a medication does not recall what med was given.      1- RASHAAD on ckd suspected  2- HTN   3- DM   4- hyperlipidemia   5- proteinuria  6- pneumonia    unclear creatinine in past suspect ckd   s/p hip surgery   hold losartan temporarily   gentle IVF hydration   ct abd/pelvis did not reveal obstruction   abx per primary team  cont with lipitor   hydralazine to cont as above  to have intact pth   start sodium bicarbonate 650 mg tid   strict I/O   cr still steady range  trend lytes   d/w pt son

## 2023-02-21 LAB
ANION GAP SERPL CALC-SCNC: 16 MMOL/L — SIGNIFICANT CHANGE UP (ref 5–17)
APTT BLD: 34.6 SEC — SIGNIFICANT CHANGE UP (ref 27.5–35.5)
APTT BLD: 54.3 SEC — HIGH (ref 27.5–35.5)
BASOPHILS # BLD AUTO: 0 K/UL — SIGNIFICANT CHANGE UP (ref 0–0.2)
BASOPHILS NFR BLD AUTO: 0 % — SIGNIFICANT CHANGE UP (ref 0–2)
BUN SERPL-MCNC: 91 MG/DL — HIGH (ref 7–23)
CALCIUM SERPL-MCNC: 8.6 MG/DL — SIGNIFICANT CHANGE UP (ref 8.4–10.5)
CHLORIDE SERPL-SCNC: 105 MMOL/L — SIGNIFICANT CHANGE UP (ref 96–108)
CO2 SERPL-SCNC: 14 MMOL/L — LOW (ref 22–31)
CREAT SERPL-MCNC: 5.04 MG/DL — HIGH (ref 0.5–1.3)
EGFR: 11 ML/MIN/1.73M2 — LOW
EOSINOPHIL # BLD AUTO: 0 K/UL — SIGNIFICANT CHANGE UP (ref 0–0.5)
EOSINOPHIL NFR BLD AUTO: 0 % — SIGNIFICANT CHANGE UP (ref 0–6)
GIANT PLATELETS BLD QL SMEAR: PRESENT — SIGNIFICANT CHANGE UP
GLUCOSE BLDC GLUCOMTR-MCNC: 190 MG/DL — HIGH (ref 70–99)
GLUCOSE BLDC GLUCOMTR-MCNC: 216 MG/DL — HIGH (ref 70–99)
GLUCOSE BLDC GLUCOMTR-MCNC: 223 MG/DL — HIGH (ref 70–99)
GLUCOSE BLDC GLUCOMTR-MCNC: 255 MG/DL — HIGH (ref 70–99)
GLUCOSE SERPL-MCNC: 253 MG/DL — HIGH (ref 70–99)
HCT VFR BLD CALC: 40.8 % — SIGNIFICANT CHANGE UP (ref 39–50)
HGB BLD-MCNC: 13.9 G/DL — SIGNIFICANT CHANGE UP (ref 13–17)
LYMPHOCYTES # BLD AUTO: 3.05 K/UL — SIGNIFICANT CHANGE UP (ref 1–3.3)
LYMPHOCYTES # BLD AUTO: 32.2 % — SIGNIFICANT CHANGE UP (ref 13–44)
MAGNESIUM SERPL-MCNC: 2.8 MG/DL — HIGH (ref 1.6–2.6)
MANUAL SMEAR VERIFICATION: SIGNIFICANT CHANGE UP
MCHC RBC-ENTMCNC: 30.2 PG — SIGNIFICANT CHANGE UP (ref 27–34)
MCHC RBC-ENTMCNC: 34.1 GM/DL — SIGNIFICANT CHANGE UP (ref 32–36)
MCV RBC AUTO: 88.7 FL — SIGNIFICANT CHANGE UP (ref 80–100)
MONOCYTES # BLD AUTO: 0.82 K/UL — SIGNIFICANT CHANGE UP (ref 0–0.9)
MONOCYTES NFR BLD AUTO: 8.7 % — SIGNIFICANT CHANGE UP (ref 2–14)
NEUTROPHILS # BLD AUTO: 5.6 K/UL — SIGNIFICANT CHANGE UP (ref 1.8–7.4)
NEUTROPHILS NFR BLD AUTO: 42.6 % — LOW (ref 43–77)
NEUTS BAND # BLD: 16.5 % — HIGH (ref 0–8)
PLAT MORPH BLD: NORMAL — SIGNIFICANT CHANGE UP
PLATELET # BLD AUTO: 213 K/UL — SIGNIFICANT CHANGE UP (ref 150–400)
POTASSIUM SERPL-MCNC: 4.7 MMOL/L — SIGNIFICANT CHANGE UP (ref 3.5–5.3)
POTASSIUM SERPL-SCNC: 4.7 MMOL/L — SIGNIFICANT CHANGE UP (ref 3.5–5.3)
RBC # BLD: 4.6 M/UL — SIGNIFICANT CHANGE UP (ref 4.2–5.8)
RBC # FLD: 13.2 % — SIGNIFICANT CHANGE UP (ref 10.3–14.5)
RBC BLD AUTO: SIGNIFICANT CHANGE UP
SODIUM SERPL-SCNC: 135 MMOL/L — SIGNIFICANT CHANGE UP (ref 135–145)
TSH SERPL-MCNC: 0.71 UIU/ML — SIGNIFICANT CHANGE UP (ref 0.27–4.2)
TSH SERPL-MCNC: 1.55 UIU/ML — SIGNIFICANT CHANGE UP (ref 0.27–4.2)
WBC # BLD: 9.47 K/UL — SIGNIFICANT CHANGE UP (ref 3.8–10.5)
WBC # FLD AUTO: 9.47 K/UL — SIGNIFICANT CHANGE UP (ref 3.8–10.5)

## 2023-02-21 PROCEDURE — 71045 X-RAY EXAM CHEST 1 VIEW: CPT | Mod: 26

## 2023-02-21 PROCEDURE — 71250 CT THORAX DX C-: CPT | Mod: 26

## 2023-02-21 RX ORDER — ISOSORBIDE DINITRATE 5 MG/1
10 TABLET ORAL THREE TIMES A DAY
Refills: 0 | Status: DISCONTINUED | OUTPATIENT
Start: 2023-02-21 | End: 2023-02-22

## 2023-02-21 RX ORDER — SODIUM BICARBONATE 1 MEQ/ML
0.05 SYRINGE (ML) INTRAVENOUS
Qty: 75 | Refills: 0 | Status: DISCONTINUED | OUTPATIENT
Start: 2023-02-21 | End: 2023-02-23

## 2023-02-21 RX ORDER — DORZOLAMIDE HYDROCHLORIDE TIMOLOL MALEATE 20; 5 MG/ML; MG/ML
1 SOLUTION/ DROPS OPHTHALMIC
Refills: 0 | Status: DISCONTINUED | OUTPATIENT
Start: 2023-02-21 | End: 2023-03-01

## 2023-02-21 RX ORDER — AMIODARONE HYDROCHLORIDE 400 MG/1
200 TABLET ORAL
Refills: 0 | Status: DISCONTINUED | OUTPATIENT
Start: 2023-02-21 | End: 2023-02-25

## 2023-02-21 RX ORDER — CHLORHEXIDINE GLUCONATE 213 G/1000ML
1 SOLUTION TOPICAL DAILY
Refills: 0 | Status: DISCONTINUED | OUTPATIENT
Start: 2023-02-21 | End: 2023-03-01

## 2023-02-21 RX ORDER — BRIMONIDINE TARTRATE 2 MG/MG
1 SOLUTION/ DROPS OPHTHALMIC
Refills: 0 | Status: DISCONTINUED | OUTPATIENT
Start: 2023-02-21 | End: 2023-03-01

## 2023-02-21 RX ORDER — HYDRALAZINE HCL 50 MG
25 TABLET ORAL THREE TIMES A DAY
Refills: 0 | Status: DISCONTINUED | OUTPATIENT
Start: 2023-02-21 | End: 2023-02-21

## 2023-02-21 RX ORDER — PREDNISOLONE SODIUM PHOSPHATE 1 %
1 DROPS OPHTHALMIC (EYE) THREE TIMES A DAY
Refills: 0 | Status: DISCONTINUED | OUTPATIENT
Start: 2023-02-21 | End: 2023-03-01

## 2023-02-21 RX ADMIN — Medication 2: at 12:14

## 2023-02-21 RX ADMIN — CHLORHEXIDINE GLUCONATE 1 APPLICATION(S): 213 SOLUTION TOPICAL at 17:48

## 2023-02-21 RX ADMIN — Medication 1 TABLET(S): at 12:26

## 2023-02-21 RX ADMIN — HEPARIN SODIUM 850 UNIT(S)/HR: 5000 INJECTION INTRAVENOUS; SUBCUTANEOUS at 10:33

## 2023-02-21 RX ADMIN — Medication 975 MILLIGRAM(S): at 23:11

## 2023-02-21 RX ADMIN — HEPARIN SODIUM 850 UNIT(S)/HR: 5000 INJECTION INTRAVENOUS; SUBCUTANEOUS at 08:06

## 2023-02-21 RX ADMIN — Medication 975 MILLIGRAM(S): at 08:00

## 2023-02-21 RX ADMIN — PANTOPRAZOLE SODIUM 40 MILLIGRAM(S): 20 TABLET, DELAYED RELEASE ORAL at 06:43

## 2023-02-21 RX ADMIN — Medication 25 MILLIGRAM(S): at 06:43

## 2023-02-21 RX ADMIN — Medication 1 DROP(S): at 23:07

## 2023-02-21 RX ADMIN — Medication 2: at 08:40

## 2023-02-21 RX ADMIN — Medication 50 MEQ/KG/HR: at 23:07

## 2023-02-21 RX ADMIN — ISOSORBIDE DINITRATE 10 MILLIGRAM(S): 5 TABLET ORAL at 12:13

## 2023-02-21 RX ADMIN — Medication 975 MILLIGRAM(S): at 22:11

## 2023-02-21 RX ADMIN — AZITHROMYCIN 255 MILLIGRAM(S): 500 TABLET, FILM COATED ORAL at 08:07

## 2023-02-21 RX ADMIN — AMIODARONE HYDROCHLORIDE 400 MILLIGRAM(S): 400 TABLET ORAL at 06:42

## 2023-02-21 RX ADMIN — HEPARIN SODIUM 1100 UNIT(S)/HR: 5000 INJECTION INTRAVENOUS; SUBCUTANEOUS at 19:34

## 2023-02-21 RX ADMIN — Medication 50 MILLIGRAM(S): at 06:42

## 2023-02-21 RX ADMIN — ATORVASTATIN CALCIUM 40 MILLIGRAM(S): 80 TABLET, FILM COATED ORAL at 22:11

## 2023-02-21 RX ADMIN — SENNA PLUS 2 TABLET(S): 8.6 TABLET ORAL at 22:11

## 2023-02-21 RX ADMIN — Medication 650 MILLIGRAM(S): at 15:47

## 2023-02-21 RX ADMIN — Medication 3: at 17:45

## 2023-02-21 RX ADMIN — POLYETHYLENE GLYCOL 3350 17 GRAM(S): 17 POWDER, FOR SOLUTION ORAL at 22:12

## 2023-02-21 RX ADMIN — Medication 975 MILLIGRAM(S): at 06:43

## 2023-02-21 RX ADMIN — HEPARIN SODIUM 1100 UNIT(S)/HR: 5000 INJECTION INTRAVENOUS; SUBCUTANEOUS at 20:24

## 2023-02-21 RX ADMIN — ISOSORBIDE DINITRATE 10 MILLIGRAM(S): 5 TABLET ORAL at 17:48

## 2023-02-21 RX ADMIN — Medication 975 MILLIGRAM(S): at 15:47

## 2023-02-21 RX ADMIN — AMIODARONE HYDROCHLORIDE 200 MILLIGRAM(S): 400 TABLET ORAL at 17:48

## 2023-02-21 RX ADMIN — CEFTRIAXONE 100 MILLIGRAM(S): 500 INJECTION, POWDER, FOR SOLUTION INTRAMUSCULAR; INTRAVENOUS at 06:46

## 2023-02-21 RX ADMIN — Medication 650 MILLIGRAM(S): at 06:43

## 2023-02-21 NOTE — PROGRESS NOTE ADULT - SUBJECTIVE AND OBJECTIVE BOX
CARDIOLOGY     PROGRESS  NOTE   ________________________________________________    CHIEF COMPLAINT:Patient is a 83y old  Male who presents with a chief complaint of hip fx (19 Feb 2023 14:17)  no complain, doing well  	  REVIEW OF SYSTEMS:  CONSTITUTIONAL: No fever, weight loss, or fatigue  EYES: No eye pain, visual disturbances, or discharge  ENT:  No difficulty hearing, tinnitus, vertigo; No sinus or throat pain  NECK: No pain or stiffness  RESPIRATORY: No cough, wheezing, chills or hemoptysis; No Shortness of Breath  CARDIOVASCULAR: No chest pain, palpitations, passing out, dizziness, or leg swelling  GASTROINTESTINAL: No abdominal or epigastric pain. No nausea, vomiting, or hematemesis; No diarrhea or constipation. No melena or hematochezia.  GENITOURINARY: No dysuria, frequency, hematuria, or incontinence  NEUROLOGICAL: No headaches, memory loss, loss of strength, numbness, or tremors  SKIN: No itching, burning, rashes, or lesions   LYMPH Nodes: No enlarged glands  ENDOCRINE: No heat or cold intolerance; No hair loss  MUSCULOSKELETAL: No joint pain or swelling; No muscle, back, or extremity pain  PSYCHIATRIC: No depression, anxiety, mood swings, or difficulty sleeping  HEME/LYMPH: No easy bruising, or bleeding gums  ALLERGY AND IMMUNOLOGIC: No hives or eczema	    [x ] All others negative	  [ ] Unable to obtain    PHYSICAL EXAM:  T(C): 36.3 (02-21-23 @ 08:50), Max: 37 (02-20-23 @ 22:03)  HR: 108 (02-21-23 @ 08:50) (80 - 111)  BP: 98/72 (02-21-23 @ 08:50) (98/72 - 177/90)  RR: 18 (02-21-23 @ 08:50) (18 - 22)  SpO2: 93% (02-21-23 @ 08:50) (93% - 100%)  Wt(kg): --  I&O's Summary    20 Feb 2023 07:01  -  21 Feb 2023 07:00  --------------------------------------------------------  IN: 455 mL / OUT: 0 mL / NET: 455 mL    21 Feb 2023 07:01  -  21 Feb 2023 08:57  --------------------------------------------------------  IN: 0 mL / OUT: 0 mL / NET: 0 mL        Appearance: Normal	  HEENT:   Normal oral mucosa, PERRL, EOMI	  Lymphatic: No lymphadenopathy  Cardiovascular: Normal S1 S2, No JVD, + murmurs, No edema  Respiratory:rhonchi  Psychiatry: ?dementia  Gastrointestinal:  Soft, Non-tender, + BS	  Skin: No rashes, No ecchymoses, No cyanosis	  Extremities: Normal range of motion, No clubbing, cyanosis or edema  Vascular: Peripheral pulses palpable 2+ bilaterally    MEDICATIONS  (STANDING):  acetaminophen     Tablet .. 975 milliGRAM(s) Oral every 8 hours  aMIOdarone    Tablet 400 milliGRAM(s) Oral two times a day  atorvastatin 40 milliGRAM(s) Oral at bedtime  azithromycin  IVPB 500 milliGRAM(s) IV Intermittent every 24 hours  cefTRIAXone   IVPB 1000 milliGRAM(s) IV Intermittent every 24 hours  dextrose 5%. 1000 milliLiter(s) (100 mL/Hr) IV Continuous <Continuous>  dextrose 5%. 1000 milliLiter(s) (50 mL/Hr) IV Continuous <Continuous>  dextrose 50% Injectable 25 Gram(s) IV Push once  dextrose 50% Injectable 12.5 Gram(s) IV Push once  dextrose 50% Injectable 25 Gram(s) IV Push once  glucagon  Injectable 1 milliGRAM(s) IntraMuscular once  heparin  Infusion.  Unit(s)/Hr (9.5 mL/Hr) IV Continuous <Continuous>  hydrALAZINE 50 milliGRAM(s) Oral three times a day  insulin lispro (ADMELOG) corrective regimen sliding scale   SubCutaneous three times a day before meals  insulin lispro (ADMELOG) corrective regimen sliding scale   SubCutaneous at bedtime  metoprolol tartrate 25 milliGRAM(s) Oral two times a day  multivitamin 1 Tablet(s) Oral daily  pantoprazole    Tablet 40 milliGRAM(s) Oral before breakfast  polyethylene glycol 3350 17 Gram(s) Oral at bedtime  senna 2 Tablet(s) Oral at bedtime  sodium bicarbonate 650 milliGRAM(s) Oral three times a day      TELEMETRY: 	    ECG:  	  RADIOLOGY:  OTHER: 	  	  LABS:	 	    CARDIAC MARKERS:  CARDIAC MARKERS ( 20 Feb 2023 05:39 )  x     / x     / 363 U/L / x     / 6.2 ng/mL  CARDIAC MARKERS ( 19 Feb 2023 23:30 )  x     / x     / 369 U/L / x     / 6.3 ng/mL  CARDIAC MARKERS ( 19 Feb 2023 17:19 )  x     / x     / 207 U/L / x     / 4.3 ng/mL                                13.9   9.47  )-----------( 213      ( 21 Feb 2023 07:19 )             40.8     02-20    140  |  109<H>  |  80<H>  ----------------------------<  177<H>  4.8   |  16<L>  |  4.47<H>    Ca    8.8      20 Feb 2023 05:39  Phos  4.6     02-19  Mg     2.6     02-19      proBNP: Serum Pro-Brain Natriuretic Peptide: 6136 pg/mL (02-17 @ 02:45)    Lipid Profile:   HgA1c:   TSH:   PT/INR - ( 20 Feb 2023 05:39 )   PT: 14.1 sec;   INR: 1.21 ratio         PTT - ( 21 Feb 2023 07:16 )  PTT:54.3 sec  unclear creatinine in past suspect ckd   s/p hip surgery   hold losartan temporarily   gentle IVF hydration   ct abd/pelvis did not reveal obstruction   abx per primary team  cont with lipitor   hydralazine to cont as above  to have intact pth   start sodium bicarbonate 650 mg tid     < from: Xray Chest 1 View- PORTABLE-Urgent (Xray Chest 1 View- PORTABLE-Urgent .) (02.16.23 @ 21:31) >  Airspace disease right upper lobe and question right upper lobe nodule.  Rule out pneumonia.    Follow-up chest CT recommended.          Assessment and plan  ---------------------------  82M PMHx of DM presenting with right hip pain s/p mechanical fall today. Describes slipping in the kitchen and falling on his right side w/ right sided hip pain mild. Denies any LOC. Denies any antiplatelet or AC.   pt with hx of htn, ckd, no known hx of cad, who is very active with no cardiac complain ,s/p tripped and fall, pt was admitted to [facility] had mild elevation of trop with no sig jump , no cpk and MB was drawn. pt with no chest pain.  had a long discussion with son health care proxy the results of blood test, echo discussed with length with him the possibility of sig CAD and risk of MI and death  in addition to other medical co morbidity  renal disease explained to the health care proxy son  and son claims father  can not live with hip fx and not  being able to walk and understands the risk.  continue beta blocker and hydralazine , keep hgb at least >8  increase beta blocker to 25 mg po bid  PAF/ flutter in flutter last night  start on amio try to keep in nsr  AC if no contraindication from ortho  asa daily  lipitor  dc ivf  renal may need HD in near future  tele nsr in 60 to 70 first degree av block  decrease bp, decrease hydralazine dose  remained in NSR , continue amio/ ac

## 2023-02-21 NOTE — PROGRESS NOTE ADULT - SUBJECTIVE AND OBJECTIVE BOX
Orthopaedic Surgery Progress Note    Subjective:   Patient seen and examined. Ortho stable. Pain controlled.    Objective:  Vital Signs Last 24 Hrs  T(C): 36.9 (21 Feb 2023 02:30), Max: 37 (20 Feb 2023 22:03)  T(F): 98.4 (21 Feb 2023 02:30), Max: 98.6 (20 Feb 2023 22:03)  HR: 92 (21 Feb 2023 02:30) (80 - 111)  BP: 148/90 (21 Feb 2023 02:30) (148/90 - 177/90)  RR: 20 (21 Feb 2023 02:30) (18 - 22)  SpO2: 97% (21 Feb 2023 02:30) (95% - 100%)  Parameters below as of 21 Feb 2023 02:30  Patient On (Oxygen Delivery Method): room air      Physical Exam:    Gen: awake, alert, NAD  Resp: no increased work of breathing  RLE:  Aquacel dressing c/d/i   +EHL/FHL/TA/GS  SILT S/S/SP/DP  +DP/PT Pulses  Compartments soft  No calf TTP                                      10.8   13.23 )-----------( 169      ( 20 Feb 2023 18:15 )             35.4   02-20    140  |  109<H>  |  80<H>  ----------------------------<  177<H>  4.8   |  16<L>  |  4.47<H>    Ca    8.8      20 Feb 2023 05:39  Phos  4.6     02-19  Mg     2.6     02-19

## 2023-02-21 NOTE — PROGRESS NOTE ADULT - ASSESSMENT
81 yo male w/ pmhx of CKD, DM, HTN presented to the ED BIBEMS 2/2 due to fall. Right hip femoral fracture;  2/2 due to fall and NPO On 2/19/2023 for OR for hip surgery. noticed with abn creatinine 4.9 now at 4.4 pt and son state pt does not have ckd however, saw a nephrologist in [facility] last year once only. was given a medication does not recall what med was given.      1- RASHAAD on ckd suspected  2- HTN   3- DM   4- hyperlipidemia   5- proteinuria  6- pneumonia  7- acidosis     unclear creatinine in past suspect ckd   s/p hip surgery   hold losartan  change sodium bicarbonate to drip 1/2 NS + 75 meq nahco3- /L @ 40 cc/hr   abx per primary team  cont with lipitor   hydralazine to cont as above  uo is low trend uo   tele monitor    strict I/O   cr worsened may need dialysis during this admission   trend meagan meade son

## 2023-02-21 NOTE — PROGRESS NOTE ADULT - SUBJECTIVE AND OBJECTIVE BOX
INTERVAL HPI/OVERNIGHT EVENTS:  Pt seen and examined at bedside.     Allergies/Intolerance: No Known Allergies      MEDICATIONS  (STANDING):  acetaminophen     Tablet .. 975 milliGRAM(s) Oral every 8 hours  aMIOdarone    Tablet 400 milliGRAM(s) Oral two times a day  atorvastatin 40 milliGRAM(s) Oral at bedtime  azithromycin  IVPB 500 milliGRAM(s) IV Intermittent every 24 hours  cefTRIAXone   IVPB 1000 milliGRAM(s) IV Intermittent every 24 hours  dextrose 5%. 1000 milliLiter(s) (100 mL/Hr) IV Continuous <Continuous>  dextrose 5%. 1000 milliLiter(s) (50 mL/Hr) IV Continuous <Continuous>  dextrose 50% Injectable 25 Gram(s) IV Push once  dextrose 50% Injectable 12.5 Gram(s) IV Push once  dextrose 50% Injectable 25 Gram(s) IV Push once  glucagon  Injectable 1 milliGRAM(s) IntraMuscular once  heparin  Infusion.  Unit(s)/Hr (9.5 mL/Hr) IV Continuous <Continuous>  hydrALAZINE 25 milliGRAM(s) Oral three times a day  insulin lispro (ADMELOG) corrective regimen sliding scale   SubCutaneous three times a day before meals  insulin lispro (ADMELOG) corrective regimen sliding scale   SubCutaneous at bedtime  isosorbide   dinitrate Tablet (ISORDIL) 10 milliGRAM(s) Oral three times a day  metoprolol tartrate 25 milliGRAM(s) Oral two times a day  multivitamin 1 Tablet(s) Oral daily  pantoprazole    Tablet 40 milliGRAM(s) Oral before breakfast  polyethylene glycol 3350 17 Gram(s) Oral at bedtime  senna 2 Tablet(s) Oral at bedtime  sodium bicarbonate 650 milliGRAM(s) Oral three times a day    MEDICATIONS  (PRN):  dextrose Oral Gel 15 Gram(s) Oral once PRN Blood Glucose LESS THAN 70 milliGRAM(s)/deciliter  heparin   Injectable 4700 Unit(s) IV Push every 6 hours PRN For aPTT less than 40  traMADol 25 milliGRAM(s) Oral every 6 hours PRN Mild Pain (1 - 3)        ROS: all systems reviewed and wnl      PHYSICAL EXAMINATION:  Vital Signs Last 24 Hrs  T(C): 36.3 (21 Feb 2023 08:50), Max: 37 (20 Feb 2023 22:03)  T(F): 97.4 (21 Feb 2023 08:50), Max: 98.6 (20 Feb 2023 22:03)  HR: 108 (21 Feb 2023 08:50) (80 - 111)  BP: 98/72 (21 Feb 2023 08:50) (98/72 - 177/90)  BP(mean): --  RR: 18 (21 Feb 2023 08:50) (18 - 22)  SpO2: 93% (21 Feb 2023 08:50) (93% - 100%)    Parameters below as of 21 Feb 2023 08:50  Patient On (Oxygen Delivery Method): room air      CAPILLARY BLOOD GLUCOSE      POCT Blood Glucose.: 216 mg/dL (21 Feb 2023 08:13)  POCT Blood Glucose.: 209 mg/dL (20 Feb 2023 21:35)  POCT Blood Glucose.: 217 mg/dL (20 Feb 2023 17:52)  POCT Blood Glucose.: 250 mg/dL (20 Feb 2023 13:29)      02-20 @ 07:01  -  02-21 @ 07:00  --------------------------------------------------------  IN: 455 mL / OUT: 0 mL / NET: 455 mL    02-21 @ 07:01  -  02-21 @ 10:47  --------------------------------------------------------  IN: 0 mL / OUT: 0 mL / NET: 0 mL        GENERAL:   NECK: supple, No JVD  CHEST/LUNG: clear to auscultation bilaterally; no rales, rhonchi, or wheezing b/l  HEART: normal S1, S2  ABDOMEN: BS+, soft, ND, NT   EXTREMITIES:  pulses palpable; no clubbing, cyanosis, or edema b/l LEs  SKIN: no rashes or lesions      LABS:                        13.9   9.47  )-----------( 213      ( 21 Feb 2023 07:19 )             40.8     02-20    140  |  109<H>  |  80<H>  ----------------------------<  177<H>  4.8   |  16<L>  |  4.47<H>    Ca    8.8      20 Feb 2023 05:39  Phos  4.6     02-19  Mg     2.6     02-19      PT/INR - ( 20 Feb 2023 05:39 )   PT: 14.1 sec;   INR: 1.21 ratio         PTT - ( 21 Feb 2023 07:16 )  PTT:54.3 sec           INTERVAL HPI/OVERNIGHT EVENTS:  Pt seen and examined at bedside.     Allergies/Intolerance: No Known Allergies      MEDICATIONS  (STANDING):  acetaminophen     Tablet .. 975 milliGRAM(s) Oral every 8 hours  aMIOdarone    Tablet 400 milliGRAM(s) Oral two times a day  atorvastatin 40 milliGRAM(s) Oral at bedtime  azithromycin  IVPB 500 milliGRAM(s) IV Intermittent every 24 hours  cefTRIAXone   IVPB 1000 milliGRAM(s) IV Intermittent every 24 hours  dextrose 5%. 1000 milliLiter(s) (100 mL/Hr) IV Continuous <Continuous>  dextrose 5%. 1000 milliLiter(s) (50 mL/Hr) IV Continuous <Continuous>  dextrose 50% Injectable 25 Gram(s) IV Push once  dextrose 50% Injectable 12.5 Gram(s) IV Push once  dextrose 50% Injectable 25 Gram(s) IV Push once  glucagon  Injectable 1 milliGRAM(s) IntraMuscular once  heparin  Infusion.  Unit(s)/Hr (9.5 mL/Hr) IV Continuous <Continuous>  hydrALAZINE 25 milliGRAM(s) Oral three times a day  insulin lispro (ADMELOG) corrective regimen sliding scale   SubCutaneous three times a day before meals  insulin lispro (ADMELOG) corrective regimen sliding scale   SubCutaneous at bedtime  isosorbide   dinitrate Tablet (ISORDIL) 10 milliGRAM(s) Oral three times a day  metoprolol tartrate 25 milliGRAM(s) Oral two times a day  multivitamin 1 Tablet(s) Oral daily  pantoprazole    Tablet 40 milliGRAM(s) Oral before breakfast  polyethylene glycol 3350 17 Gram(s) Oral at bedtime  senna 2 Tablet(s) Oral at bedtime  sodium bicarbonate 650 milliGRAM(s) Oral three times a day    MEDICATIONS  (PRN):  dextrose Oral Gel 15 Gram(s) Oral once PRN Blood Glucose LESS THAN 70 milliGRAM(s)/deciliter  heparin   Injectable 4700 Unit(s) IV Push every 6 hours PRN For aPTT less than 40  traMADol 25 milliGRAM(s) Oral every 6 hours PRN Mild Pain (1 - 3)        ROS: all systems reviewed and wnl      PHYSICAL EXAMINATION:  Vital Signs Last 24 Hrs  T(C): 36.3 (21 Feb 2023 08:50), Max: 37 (20 Feb 2023 22:03)  T(F): 97.4 (21 Feb 2023 08:50), Max: 98.6 (20 Feb 2023 22:03)  HR: 108 (21 Feb 2023 08:50) (80 - 111)  BP: 98/72 (21 Feb 2023 08:50) (98/72 - 177/90)  BP(mean): --  RR: 18 (21 Feb 2023 08:50) (18 - 22)  SpO2: 93% (21 Feb 2023 08:50) (93% - 100%)    Parameters below as of 21 Feb 2023 08:50  Patient On (Oxygen Delivery Method): room air      CAPILLARY BLOOD GLUCOSE      POCT Blood Glucose.: 216 mg/dL (21 Feb 2023 08:13)  POCT Blood Glucose.: 209 mg/dL (20 Feb 2023 21:35)  POCT Blood Glucose.: 217 mg/dL (20 Feb 2023 17:52)  POCT Blood Glucose.: 250 mg/dL (20 Feb 2023 13:29)      02-20 @ 07:01  -  02-21 @ 07:00  --------------------------------------------------------  IN: 455 mL / OUT: 0 mL / NET: 455 mL    02-21 @ 07:01  -  02-21 @ 10:47  --------------------------------------------------------  IN: 0 mL / OUT: 0 mL / NET: 0 mL        GENERAL: stable, in bed, comfortable on RA, no fevers or cough  NECK: supple, No JVD  CHEST/LUNG: clear to auscultation bilaterally; no rales, rhonchi, or wheezing b/l  HEART: normal S1, S2  ABDOMEN: BS+, soft, ND, NT   EXTREMITIES:  pulses palpable; no clubbing, cyanosis, or edema b/l LEs      LABS:                        13.9   9.47  )-----------( 213      ( 21 Feb 2023 07:19 )             40.8     02-20    140  |  109<H>  |  80<H>  ----------------------------<  177<H>  4.8   |  16<L>  |  4.47<H>    Ca    8.8      20 Feb 2023 05:39  Phos  4.6     02-19  Mg     2.6     02-19      PT/INR - ( 20 Feb 2023 05:39 )   PT: 14.1 sec;   INR: 1.21 ratio         PTT - ( 21 Feb 2023 07:16 )  PTT:54.3 sec

## 2023-02-21 NOTE — PROGRESS NOTE ADULT - ASSESSMENT
83 yo male w/ pmhx of CKD, DM, HTN presented to the ED [facility] 2/2 due to fall.        Right hip femoral fracture: due to fall  - consent obtained by ortho with son to procedure  - recommend Cardiology consult, given  elevated troponin, elevated BNP    Had right hip surgery. Stable in bed. Agree with CT chest.  [place]: noted on CXR, c/w Rocephin as well as azithromycin . CT chest tomorrow.      COVID- 19 :  No evidence of Hypoxia, no treatment needed.     Delirium: multifactorial in the acute setting from Hip fracture as well as PNA, frequent orientation to time and place, UA not suggestive for UTI    Elevated Troponin; continue to trend, unable to assess if any chest pain, patient is currently confused. TTE decreased EF 40 %. EKG notes inferior and septal MI. CHF, euvolemic. Will ask Cardio to follow here as well.     DM: stable, hold all meds. SSC.     Essential HTN: resume home medication hydralazine and lopressor.      Renal: Creatinine 4.46. Renal consult was called this AM. Dr. Cook will see. Renal sonogram Monday. Consider bicarb and Phoslo.     CKD V appears chronic. Has outside nephrologist.     Stable on tele, PT eval.         81 yo male w/ pmhx of CKD, DM, HTN presented to the ED [facility] 2/2 due to fall.        Plan: Right hip femoral fracture due to fall. Had right hip surgery. Stable in bed. Agree with CT chest.  [place]: noted on CXR, c/w Rocephin as well as azithromycin . CT chest tomorrow.      COVID- 19 :  No evidence of Hypoxia, no treatment needed.     Delirium: multifactorial in the acute setting from Hip fracture as well as PNA, frequent orientation to time and place, UA not suggestive for UTI    Elevated Troponin; continue to trend, unable to assess if any chest pain, patient is currently confused. TTE decreased EF 40 %. EKG notes inferior and septal MI. CHF, euvolemic. Will ask Cardio to follow here as well.     DM: stable, hold all meds. SSC.  Consider low dose Lantus.     Essential HTN: resume home medication lopressor, lipitor and Imdur. Stop hydralazine due to low BP.       Renal: Creatinine 4.46. Renal consult was called this AM. Dr. Cook will see. Renal sonogram Monday. Agree bicarb.      CKD V appears chronic. Has outside nephrologist. Creatinine here is 4.47.     Stable on tele, PT eval.

## 2023-02-21 NOTE — PROGRESS NOTE ADULT - SUBJECTIVE AND OBJECTIVE BOX
[facility]   [phone]  RENAL FOLLOW UP NOTE  --------------------------------------------------------------------------------  Chief Complaint:    24 hour events/subjective:    seen earlier   confused more as paranoia when seen     PAST HISTORY  --------------------------------------------------------------------------------  No significant changes to PMH, PSH, FHx, SHx, unless otherwise noted    ALLERGIES & MEDICATIONS  --------------------------------------------------------------------------------  Allergies    No Known Allergies    Intolerances      Standing Inpatient Medications  acetaminophen     Tablet .. 975 milliGRAM(s) Oral every 8 hours  aMIOdarone    Tablet 200 milliGRAM(s) Oral two times a day  atorvastatin 40 milliGRAM(s) Oral at bedtime  azithromycin  IVPB 500 milliGRAM(s) IV Intermittent every 24 hours  brimonidine 0.2% Ophthalmic Solution 1 Drop(s) Both EYES two times a day  cefTRIAXone   IVPB 1000 milliGRAM(s) IV Intermittent every 24 hours  chlorhexidine 2% Cloths 1 Application(s) Topical daily  dextrose 5%. 1000 milliLiter(s) IV Continuous <Continuous>  dextrose 5%. 1000 milliLiter(s) IV Continuous <Continuous>  dextrose 50% Injectable 25 Gram(s) IV Push once  dextrose 50% Injectable 12.5 Gram(s) IV Push once  dextrose 50% Injectable 25 Gram(s) IV Push once  dorzolamide 2%/timolol 0.5% Ophthalmic Solution 1 Drop(s) Both EYES two times a day  glucagon  Injectable 1 milliGRAM(s) IntraMuscular once  heparin  Infusion.  Unit(s)/Hr IV Continuous <Continuous>  insulin lispro (ADMELOG) corrective regimen sliding scale   SubCutaneous three times a day before meals  insulin lispro (ADMELOG) corrective regimen sliding scale   SubCutaneous at bedtime  isosorbide   dinitrate Tablet (ISORDIL) 10 milliGRAM(s) Oral three times a day  multivitamin 1 Tablet(s) Oral daily  pantoprazole    Tablet 40 milliGRAM(s) Oral before breakfast  polyethylene glycol 3350 17 Gram(s) Oral at bedtime  prednisoLONE acetate 1% Suspension 1 Drop(s) Left EYE three times a day  senna 2 Tablet(s) Oral at bedtime  sodium bicarbonate 650 milliGRAM(s) Oral three times a day    PRN Inpatient Medications  dextrose Oral Gel 15 Gram(s) Oral once PRN  heparin   Injectable 4700 Unit(s) IV Push every 6 hours PRN  traMADol 25 milliGRAM(s) Oral every 6 hours PRN      REVIEW OF SYSTEMS  --------------------------------------------------------------------------------        VITALS/PHYSICAL EXAM  --------------------------------------------------------------------------------  T(C): 36.9 (02-21-23 @ 17:40), Max: 37 (02-20-23 @ 22:03)  HR: 89 (02-21-23 @ 17:40) (89 - 108)  BP: 134/66 (02-21-23 @ 19:15) (98/72 - 177/90)  RR: 18 (02-21-23 @ 17:40) (18 - 20)  SpO2: 98% (02-21-23 @ 17:40) (93% - 98%)  Wt(kg): --        02-20-23 @ 07:01  -  02-21-23 @ 07:00  --------------------------------------------------------  IN: 455 mL / OUT: 0 mL / NET: 455 mL    02-21-23 @ 07:01  -  02-21-23 @ 20:53  --------------------------------------------------------  IN: 942 mL / OUT: 200 mL / NET: 742 mL      Physical Exam:  	  	Gen: confused paranoia   	Pulm: Decreased breath sounds b/l bases. no rales or ronchi or wheezing  	CV: RRR, S1/S2. no rub  	Abd: +BS, soft, nontender/nondistended  	: No suprapubic tenderness.               Extremity: No cyanosis, no edema        LABS/STUDIES  --------------------------------------------------------------------------------              13.9   9.47  >-----------<  213      [02-21-23 @ 07:19]              40.8     135  |  105  |  91  ----------------------------<  253      [02-21-23 @ 16:38]  4.7   |  14  |  5.04        Ca     8.6     [02-21-23 @ 16:38]      Mg     2.8     [02-21-23 @ 16:38]      Phos  4.6     [02-19-23 @ 23:30]      PT/INR: PT 14.1 , INR 1.21       [02-20-23 @ 05:39]  PTT: 34.6       [02-21-23 @ 16:38]          [02-20-23 @ 05:39]    Creatinine Trend:  SCr 5.04 [02-21 @ 16:38]  SCr 4.47 [02-20 @ 05:39]  SCr 4.42 [02-19 @ 23:30]  SCr 4.43 [02-19 @ 17:19]  SCr 4.52 [02-19 @ 09:45]              Urinalysis - [02-16-23 @ 22:15]      Color Yellow / Appearance Clear / SG 1.015 / pH 6.0      Gluc 1000 / Ketone Trace  / Bili Negative / Urobili Negative       Blood Small / Protein 500 / Leuk Est Negative / Nitrite Negative      RBC 0-2 / WBC Negative / Hyaline  / Gran  / Sq Epi  / Non Sq Epi Occasional / Bacteria Occasional      PTH -- (Ca 8.6)      [02-20-23 @ 05:39]   265  TSH 1.55      [02-21-23 @ 07:17]

## 2023-02-21 NOTE — PROGRESS NOTE ADULT - ASSESSMENT
83y Male s/p R hemiarthroplasty on 2/19/23.  - FU BLLE duplex  - Pain control  - WBAT  - Posterior dislocation precautions  - PT/OT/OOB  - Transfuse for Hgb <10 during first 24h postop, per Dr. Salmeron  - KEDAR AM labs  - DVT ppx: SQH--ok for lovenox if indicated, as discussed with team; would defer heparin drip if possible  - Dispo planning

## 2023-02-22 LAB
ALBUMIN SERPL ELPH-MCNC: 2.8 G/DL — LOW (ref 3.3–5)
ALP SERPL-CCNC: 53 U/L — SIGNIFICANT CHANGE UP (ref 40–120)
ALT FLD-CCNC: <5 U/L — LOW (ref 10–45)
ANION GAP SERPL CALC-SCNC: 16 MMOL/L — SIGNIFICANT CHANGE UP (ref 5–17)
APTT BLD: 54.7 SEC — HIGH (ref 27.5–35.5)
APTT BLD: 55.2 SEC — HIGH (ref 27.5–35.5)
APTT BLD: >200 SEC — CRITICAL HIGH (ref 27.5–35.5)
AST SERPL-CCNC: 25 U/L — SIGNIFICANT CHANGE UP (ref 10–40)
BILIRUB SERPL-MCNC: 0.3 MG/DL — SIGNIFICANT CHANGE UP (ref 0.2–1.2)
BUN SERPL-MCNC: 92 MG/DL — HIGH (ref 7–23)
CALCIUM SERPL-MCNC: 8.3 MG/DL — LOW (ref 8.4–10.5)
CHLORIDE SERPL-SCNC: 108 MMOL/L — SIGNIFICANT CHANGE UP (ref 96–108)
CO2 SERPL-SCNC: 15 MMOL/L — LOW (ref 22–31)
CREAT SERPL-MCNC: 5.16 MG/DL — HIGH (ref 0.5–1.3)
CULTURE RESULTS: SIGNIFICANT CHANGE UP
CULTURE RESULTS: SIGNIFICANT CHANGE UP
EGFR: 10 ML/MIN/1.73M2 — LOW
GLUCOSE BLDC GLUCOMTR-MCNC: 157 MG/DL — HIGH (ref 70–99)
GLUCOSE BLDC GLUCOMTR-MCNC: 196 MG/DL — HIGH (ref 70–99)
GLUCOSE BLDC GLUCOMTR-MCNC: 206 MG/DL — HIGH (ref 70–99)
GLUCOSE BLDC GLUCOMTR-MCNC: 222 MG/DL — HIGH (ref 70–99)
GLUCOSE SERPL-MCNC: 188 MG/DL — HIGH (ref 70–99)
HCT VFR BLD CALC: 29.3 % — LOW (ref 39–50)
HGB BLD-MCNC: 8.9 G/DL — LOW (ref 13–17)
MAGNESIUM SERPL-MCNC: 2.7 MG/DL — HIGH (ref 1.6–2.6)
MCHC RBC-ENTMCNC: 27.5 PG — SIGNIFICANT CHANGE UP (ref 27–34)
MCHC RBC-ENTMCNC: 30.4 GM/DL — LOW (ref 32–36)
MCV RBC AUTO: 90.4 FL — SIGNIFICANT CHANGE UP (ref 80–100)
MRSA PCR RESULT.: SIGNIFICANT CHANGE UP
NRBC # BLD: 0 /100 WBCS — SIGNIFICANT CHANGE UP (ref 0–0)
PLATELET # BLD AUTO: 169 K/UL — SIGNIFICANT CHANGE UP (ref 150–400)
POTASSIUM SERPL-MCNC: 4.4 MMOL/L — SIGNIFICANT CHANGE UP (ref 3.5–5.3)
POTASSIUM SERPL-SCNC: 4.4 MMOL/L — SIGNIFICANT CHANGE UP (ref 3.5–5.3)
PROT SERPL-MCNC: 5.8 G/DL — LOW (ref 6–8.3)
RBC # BLD: 3.24 M/UL — LOW (ref 4.2–5.8)
RBC # FLD: 14.3 % — SIGNIFICANT CHANGE UP (ref 10.3–14.5)
S AUREUS DNA NOSE QL NAA+PROBE: SIGNIFICANT CHANGE UP
SODIUM SERPL-SCNC: 139 MMOL/L — SIGNIFICANT CHANGE UP (ref 135–145)
SPECIMEN SOURCE: SIGNIFICANT CHANGE UP
SPECIMEN SOURCE: SIGNIFICANT CHANGE UP
WBC # BLD: 10.7 K/UL — HIGH (ref 3.8–10.5)
WBC # FLD AUTO: 10.7 K/UL — HIGH (ref 3.8–10.5)

## 2023-02-22 PROCEDURE — 93970 EXTREMITY STUDY: CPT | Mod: 26

## 2023-02-22 RX ORDER — HYDRALAZINE HCL 50 MG
50 TABLET ORAL THREE TIMES A DAY
Refills: 0 | Status: DISCONTINUED | OUTPATIENT
Start: 2023-02-22 | End: 2023-02-28

## 2023-02-22 RX ORDER — ISOSORBIDE MONONITRATE 60 MG/1
30 TABLET, EXTENDED RELEASE ORAL DAILY
Refills: 0 | Status: DISCONTINUED | OUTPATIENT
Start: 2023-02-22 | End: 2023-02-24

## 2023-02-22 RX ADMIN — CEFTRIAXONE 100 MILLIGRAM(S): 500 INJECTION, POWDER, FOR SOLUTION INTRAMUSCULAR; INTRAVENOUS at 04:24

## 2023-02-22 RX ADMIN — Medication 975 MILLIGRAM(S): at 22:07

## 2023-02-22 RX ADMIN — HEPARIN SODIUM 850 UNIT(S)/HR: 5000 INJECTION INTRAVENOUS; SUBCUTANEOUS at 13:10

## 2023-02-22 RX ADMIN — POLYETHYLENE GLYCOL 3350 17 GRAM(S): 17 POWDER, FOR SOLUTION ORAL at 22:07

## 2023-02-22 RX ADMIN — Medication 1: at 08:37

## 2023-02-22 RX ADMIN — Medication 1 DROP(S): at 22:07

## 2023-02-22 RX ADMIN — SENNA PLUS 2 TABLET(S): 8.6 TABLET ORAL at 22:06

## 2023-02-22 RX ADMIN — Medication 975 MILLIGRAM(S): at 23:07

## 2023-02-22 RX ADMIN — Medication 975 MILLIGRAM(S): at 06:43

## 2023-02-22 RX ADMIN — Medication 975 MILLIGRAM(S): at 13:41

## 2023-02-22 RX ADMIN — Medication 975 MILLIGRAM(S): at 13:11

## 2023-02-22 RX ADMIN — ATORVASTATIN CALCIUM 40 MILLIGRAM(S): 80 TABLET, FILM COATED ORAL at 22:07

## 2023-02-22 RX ADMIN — HEPARIN SODIUM 850 UNIT(S)/HR: 5000 INJECTION INTRAVENOUS; SUBCUTANEOUS at 04:21

## 2023-02-22 RX ADMIN — ISOSORBIDE MONONITRATE 30 MILLIGRAM(S): 60 TABLET, EXTENDED RELEASE ORAL at 11:42

## 2023-02-22 RX ADMIN — HEPARIN SODIUM 850 UNIT(S)/HR: 5000 INJECTION INTRAVENOUS; SUBCUTANEOUS at 06:35

## 2023-02-22 RX ADMIN — AMIODARONE HYDROCHLORIDE 200 MILLIGRAM(S): 400 TABLET ORAL at 05:43

## 2023-02-22 RX ADMIN — Medication 50 MILLIGRAM(S): at 13:11

## 2023-02-22 RX ADMIN — BRIMONIDINE TARTRATE 1 DROP(S): 2 SOLUTION/ DROPS OPHTHALMIC at 17:08

## 2023-02-22 RX ADMIN — AZITHROMYCIN 255 MILLIGRAM(S): 500 TABLET, FILM COATED ORAL at 05:44

## 2023-02-22 RX ADMIN — Medication 1 DROP(S): at 14:09

## 2023-02-22 RX ADMIN — PANTOPRAZOLE SODIUM 40 MILLIGRAM(S): 20 TABLET, DELAYED RELEASE ORAL at 05:43

## 2023-02-22 RX ADMIN — Medication 50 MEQ/KG/HR: at 22:07

## 2023-02-22 RX ADMIN — Medication 50 MILLIGRAM(S): at 22:06

## 2023-02-22 RX ADMIN — Medication 1 DROP(S): at 05:44

## 2023-02-22 RX ADMIN — Medication 1 TABLET(S): at 11:42

## 2023-02-22 RX ADMIN — ISOSORBIDE DINITRATE 10 MILLIGRAM(S): 5 TABLET ORAL at 02:37

## 2023-02-22 RX ADMIN — CHLORHEXIDINE GLUCONATE 1 APPLICATION(S): 213 SOLUTION TOPICAL at 13:11

## 2023-02-22 RX ADMIN — AMIODARONE HYDROCHLORIDE 200 MILLIGRAM(S): 400 TABLET ORAL at 17:08

## 2023-02-22 RX ADMIN — Medication 2: at 17:07

## 2023-02-22 RX ADMIN — HEPARIN SODIUM 0 UNIT(S)/HR: 5000 INJECTION INTRAVENOUS; SUBCUTANEOUS at 03:17

## 2023-02-22 RX ADMIN — Medication 975 MILLIGRAM(S): at 05:43

## 2023-02-22 RX ADMIN — Medication 2: at 13:12

## 2023-02-22 RX ADMIN — HEPARIN SODIUM 850 UNIT(S)/HR: 5000 INJECTION INTRAVENOUS; SUBCUTANEOUS at 19:51

## 2023-02-22 RX ADMIN — DORZOLAMIDE HYDROCHLORIDE TIMOLOL MALEATE 1 DROP(S): 20; 5 SOLUTION/ DROPS OPHTHALMIC at 17:08

## 2023-02-22 RX ADMIN — DORZOLAMIDE HYDROCHLORIDE TIMOLOL MALEATE 1 DROP(S): 20; 5 SOLUTION/ DROPS OPHTHALMIC at 05:43

## 2023-02-22 RX ADMIN — BRIMONIDINE TARTRATE 1 DROP(S): 2 SOLUTION/ DROPS OPHTHALMIC at 05:43

## 2023-02-22 NOTE — PROGRESS NOTE ADULT - SUBJECTIVE AND OBJECTIVE BOX
[facility]   [phone]  RENAL FOLLOW UP NOTE  --------------------------------------------------------------------------------  Chief Complaint:    24 hour events/subjective:  seen earlier   and later in afternoon   son at bedside   earlier AMS   when seen this evening pt responsive       PAST HISTORY  --------------------------------------------------------------------------------  No significant changes to PMH, PSH, FHx, SHx, unless otherwise noted    ALLERGIES & MEDICATIONS  --------------------------------------------------------------------------------  Allergies    No Known Allergies    Intolerances      Standing Inpatient Medications  acetaminophen     Tablet .. 975 milliGRAM(s) Oral every 8 hours  aMIOdarone    Tablet 200 milliGRAM(s) Oral two times a day  atorvastatin 40 milliGRAM(s) Oral at bedtime  brimonidine 0.2% Ophthalmic Solution 1 Drop(s) Both EYES two times a day  chlorhexidine 2% Cloths 1 Application(s) Topical daily  dextrose 5%. 1000 milliLiter(s) IV Continuous <Continuous>  dextrose 5%. 1000 milliLiter(s) IV Continuous <Continuous>  dextrose 50% Injectable 25 Gram(s) IV Push once  dextrose 50% Injectable 12.5 Gram(s) IV Push once  dextrose 50% Injectable 25 Gram(s) IV Push once  dorzolamide 2%/timolol 0.5% Ophthalmic Solution 1 Drop(s) Both EYES two times a day  glucagon  Injectable 1 milliGRAM(s) IntraMuscular once  heparin  Infusion.  Unit(s)/Hr IV Continuous <Continuous>  hydrALAZINE 50 milliGRAM(s) Oral three times a day  insulin lispro (ADMELOG) corrective regimen sliding scale   SubCutaneous three times a day before meals  insulin lispro (ADMELOG) corrective regimen sliding scale   SubCutaneous at bedtime  isosorbide   mononitrate ER Tablet (IMDUR) 30 milliGRAM(s) Oral daily  multivitamin 1 Tablet(s) Oral daily  pantoprazole    Tablet 40 milliGRAM(s) Oral before breakfast  polyethylene glycol 3350 17 Gram(s) Oral at bedtime  prednisoLONE acetate 1% Suspension 1 Drop(s) Left EYE three times a day  senna 2 Tablet(s) Oral at bedtime  sodium bicarbonate  Infusion 0.049 mEq/kG/Hr IV Continuous <Continuous>    PRN Inpatient Medications  dextrose Oral Gel 15 Gram(s) Oral once PRN  heparin   Injectable 4700 Unit(s) IV Push every 6 hours PRN  traMADol 25 milliGRAM(s) Oral every 6 hours PRN      REVIEW OF SYSTEMS  --------------------------------------------------------------------------------      VITALS/PHYSICAL EXAM  --------------------------------------------------------------------------------  T(C): 36.5 (02-22-23 @ 17:16), Max: 36.8 (02-22-23 @ 14:04)  HR: 54 (02-22-23 @ 17:16) (54 - 83)  BP: 150/62 (02-22-23 @ 17:16) (135/78 - 188/74)  RR: 18 (02-22-23 @ 17:16) (18 - 18)  SpO2: 97% (02-22-23 @ 17:16) (95% - 98%)  Wt(kg): --        02-21-23 @ 07:01  -  02-22-23 @ 07:00  --------------------------------------------------------  IN: 1847 mL / OUT: 450 mL / NET: 1397 mL    02-22-23 @ 07:01  -  02-22-23 @ 19:39  --------------------------------------------------------  IN: 1053.5 mL / OUT: 0 mL / NET: 1053.5 mL      Physical Exam:  	    Gen: earlier confused later seen responsive   	Pulm: Decreased breath sounds b/l bases. no rales or ronchi or wheezing  	CV: RRR, S1/S2. no rub  	Abd: +BS, soft, nontender/nondistended  	: No bladder distention               Extremity: No cyanosis, no edema      LABS/STUDIES  --------------------------------------------------------------------------------              8.9    10.70 >-----------<  169      [02-22-23 @ 06:16]              29.3     139  |  108  |  92  ----------------------------<  188      [02-22-23 @ 06:16]  4.4   |  15  |  5.16        Ca     8.3     [02-22-23 @ 06:16]      Mg     2.7     [02-22-23 @ 06:16]    TPro  5.8  /  Alb  2.8  /  TBili  0.3  /  DBili  x   /  AST  25  /  ALT  <5  /  AlkPhos  53  [02-22-23 @ 06:16]      PTT: 55.2       [02-22-23 @ 12:08]      Creatinine Trend:  SCr 5.16 [02-22 @ 06:16]  SCr 5.04 [02-21 @ 16:38]  SCr 4.47 [02-20 @ 05:39]  SCr 4.42 [02-19 @ 23:30]  SCr 4.43 [02-19 @ 17:19]              Urinalysis - [02-16-23 @ 22:15]      Color Yellow / Appearance Clear / SG 1.015 / pH 6.0      Gluc 1000 / Ketone Trace  / Bili Negative / Urobili Negative       Blood Small / Protein 500 / Leuk Est Negative / Nitrite Negative      RBC 0-2 / WBC Negative / Hyaline  / Gran  / Sq Epi  / Non Sq Epi Occasional / Bacteria Occasional      PTH -- (Ca 8.6)      [02-20-23 @ 05:39]   265  TSH 0.71      [02-21-23 @ 16:38]

## 2023-02-22 NOTE — PROGRESS NOTE ADULT - ASSESSMENT
83y Male s/p R hemiarthroplasty on 2/19/23.      - Pain control  - WBAT  - Posterior dislocation precautions  - PT/OT/OOB  - Transfuse for Hgb <10 during first 24h postop, per Dr. Salmeron  - KEDAR AM labs  - DVT ppx: SQH--ok for lovenox if indicated, as discussed with team; would defer heparin drip if possible  - Dispo: CORI  - Orthopaedically stable for dc when medically stable  - no further acute orthopaedic surgical intervention indicated

## 2023-02-22 NOTE — PROVIDER CONTACT NOTE (OTHER) - ACTION/TREATMENT ORDERED:
stat aPTT drawn and sent, awaiting result stat aPTT drawn and sent. Most recent aPTT result is 54.7. As per ACS nomogram, apTT result is within therapeutic range. As per ELIZABETH Mascorro, maintain current rate at 8.5 mL/hr and draw aPTT in 6 hours

## 2023-02-22 NOTE — PROGRESS NOTE ADULT - SUBJECTIVE AND OBJECTIVE BOX
INTERVAL HPI/OVERNIGHT EVENTS:  Pt seen and examined at bedside.     Allergies/Intolerance: No Known Allergies      MEDICATIONS  (STANDING):  acetaminophen     Tablet .. 975 milliGRAM(s) Oral every 8 hours  aMIOdarone    Tablet 200 milliGRAM(s) Oral two times a day  atorvastatin 40 milliGRAM(s) Oral at bedtime  azithromycin  IVPB 500 milliGRAM(s) IV Intermittent every 24 hours  brimonidine 0.2% Ophthalmic Solution 1 Drop(s) Both EYES two times a day  cefTRIAXone   IVPB 1000 milliGRAM(s) IV Intermittent every 24 hours  chlorhexidine 2% Cloths 1 Application(s) Topical daily  dextrose 5%. 1000 milliLiter(s) (100 mL/Hr) IV Continuous <Continuous>  dextrose 5%. 1000 milliLiter(s) (50 mL/Hr) IV Continuous <Continuous>  dextrose 50% Injectable 25 Gram(s) IV Push once  dextrose 50% Injectable 12.5 Gram(s) IV Push once  dextrose 50% Injectable 25 Gram(s) IV Push once  dorzolamide 2%/timolol 0.5% Ophthalmic Solution 1 Drop(s) Both EYES two times a day  glucagon  Injectable 1 milliGRAM(s) IntraMuscular once  heparin  Infusion.  Unit(s)/Hr (9.5 mL/Hr) IV Continuous <Continuous>  insulin lispro (ADMELOG) corrective regimen sliding scale   SubCutaneous three times a day before meals  insulin lispro (ADMELOG) corrective regimen sliding scale   SubCutaneous at bedtime  isosorbide   dinitrate Tablet (ISORDIL) 10 milliGRAM(s) Oral three times a day  multivitamin 1 Tablet(s) Oral daily  pantoprazole    Tablet 40 milliGRAM(s) Oral before breakfast  polyethylene glycol 3350 17 Gram(s) Oral at bedtime  prednisoLONE acetate 1% Suspension 1 Drop(s) Left EYE three times a day  senna 2 Tablet(s) Oral at bedtime  sodium bicarbonate  Infusion 0.049 mEq/kG/Hr (50 mL/Hr) IV Continuous <Continuous>    MEDICATIONS  (PRN):  dextrose Oral Gel 15 Gram(s) Oral once PRN Blood Glucose LESS THAN 70 milliGRAM(s)/deciliter  heparin   Injectable 4700 Unit(s) IV Push every 6 hours PRN For aPTT less than 40  traMADol 25 milliGRAM(s) Oral every 6 hours PRN Mild Pain (1 - 3)        ROS: all systems reviewed and wnl      PHYSICAL EXAMINATION:  Vital Signs Last 24 Hrs  T(C): 36.5 (22 Feb 2023 05:50), Max: 36.9 (21 Feb 2023 17:40)  T(F): 97.7 (22 Feb 2023 05:50), Max: 98.4 (21 Feb 2023 17:40)  HR: 64 (22 Feb 2023 05:50) (64 - 100)  BP: 135/78 (22 Feb 2023 05:50) (123/73 - 188/74)  BP(mean): --  RR: 18 (22 Feb 2023 05:50) (18 - 18)  SpO2: 97% (22 Feb 2023 05:50) (95% - 98%)    Parameters below as of 22 Feb 2023 05:50  Patient On (Oxygen Delivery Method): room air      CAPILLARY BLOOD GLUCOSE      POCT Blood Glucose.: 196 mg/dL (22 Feb 2023 08:34)  POCT Blood Glucose.: 190 mg/dL (21 Feb 2023 22:49)  POCT Blood Glucose.: 255 mg/dL (21 Feb 2023 17:41)  POCT Blood Glucose.: 223 mg/dL (21 Feb 2023 12:03)      02-21 @ 07:01  -  02-22 @ 07:00  --------------------------------------------------------  IN: 1847 mL / OUT: 450 mL / NET: 1397 mL        GENERAL: stable, comfortable on RA, no fevers, cough, eating lunch well yesterday.  NECK: supple, No JVD  CHEST/LUNG: clear to auscultation bilaterally; no rales, rhonchi, or wheezing b/l  HEART: normal S1, S2  ABDOMEN: BS+, soft, ND, NT   EXTREMITIES:  pulses palpable; no clubbing, cyanosis, or edema b/l LEs    LABS:                        8.9    10.70 )-----------( 169      ( 22 Feb 2023 06:16 )             29.3     02-22    139  |  108  |  92<H>  ----------------------------<  188<H>  4.4   |  15<L>  |  5.16<H>    Ca    8.3<L>      22 Feb 2023 06:16  Mg     2.7     02-22    TPro  5.8<L>  /  Alb  2.8<L>  /  TBili  0.3  /  DBili  x   /  AST  25  /  ALT  <5<L>  /  AlkPhos  53  02-22    PTT - ( 22 Feb 2023 06:16 )  PTT:54.7 sec

## 2023-02-22 NOTE — PROGRESS NOTE ADULT - SUBJECTIVE AND OBJECTIVE BOX
Orthopaedic Surgery Progress Note    Subjective:   Patient seen and examined this AM. No acute orthopaedic events overnight. Pain controlled.      Physical Exam:    Gen: awake, alert, NAD  Resp: no increased work of breathing  RLE:  Aquacel dressing c/d/i   +EHL/FHL/TA/GS  SILT S/S/SP/DP  +DP/PT Pulses  Compartments soft  No calf TTP                 LABS:                        8.9    10.70 )-----------( 169      ( 22 Feb 2023 06:16 )             29.3     02-21    135  |  105  |  91<H>  ----------------------------<  253<H>  4.7   |  14<L>  |  5.04<H>    Ca    8.6      21 Feb 2023 16:38  Mg     2.8     02-21      PTT - ( 22 Feb 2023 06:16 )  PTT:54.7 sec      VITAL SIGNS:  T(C): 36.5 (02-22-23 @ 05:50), Max: 36.9 (02-21-23 @ 17:40)  HR: 64 (02-22-23 @ 05:50) (64 - 108)  BP: 135/78 (02-22-23 @ 05:50) (98/72 - 188/74)  RR: 18 (02-22-23 @ 05:50) (18 - 18)  SpO2: 97% (02-22-23 @ 05:50) (93% - 98%)

## 2023-02-22 NOTE — PROVIDER CONTACT NOTE (CRITICAL VALUE NOTIFICATION) - ASSESSMENT
Pt is AAOx1-2. Pt on heparin gtt and aPTT value greater than 200. No signs of bleeding noted, vital signs obtained and stable. Pt hypertensive, PO dose of isordil recently given.
level is high

## 2023-02-22 NOTE — PROGRESS NOTE ADULT - ASSESSMENT
81 yo male w/ pmhx of CKD, DM, HTN presented to the ED [facility] 2/2 due to fall.        Plan: Right hip femoral fracture due to fall. Had right hip surgery. Stable in bed. Agree with CT chest.  [place]: noted on CXR, c/w Rocephin as well as azithromycin . CT chest tomorrow.      COVID- 19 :  No evidence of Hypoxia, no treatment needed.     Delirium: multifactorial in the acute setting from Hip fracture as well as PNA, frequent orientation to time and place, UA not suggestive for UTI    Elevated Troponin; continue to trend, unable to assess if any chest pain, patient is currently confused. TTE decreased EF 40 %. EKG notes inferior and septal MI. CHF, euvolemic. Will ask Cardio to follow here as well.     DM: stable, hold all meds. SSC.  Consider low dose Lantus.     Essential HTN: resume home medication lopressor, lipitor and Imdur. Stop hydralazine due to low BP.       Renal: Creatinine 4.46. Renal consult was called this AM. Dr. Cook will see. Renal sonogram Monday. Agree bicarb.      CKD V appears chronic. Has outside nephrologist. Creatinine here is 4.47.     Stable on tele, PT eval.         83 yo male w/ pmhx of CKD, DM, HTN presented to the ED [facility] 2/2 due to fall.        Plan: Right hip femoral fracture due to fall. Had right hip surgery. Stable in bed.  CT chest no pneumonia.  Stop all ABX.       COVID- 19 :  No evidence of Hypoxia, no treatment needed.     Delirium: multifactorial in the acute setting from Hip fracture as well as PNA, frequent orientation to time and place, UA not suggestive for UTI    Elevated Troponin; continue to trend, unable to assess if any chest pain, patient is currently confused. TTE decreased EF 40 %. EKG notes inferior and septal MI. CHF, euvolemic. Will ask Cardio to follow here as well.     DM: stable, hold all meds. SSC.  Consider low dose Lantus.  Acceptable today around 195 off Lantus.      Essential HTN: resume home medication lopressor, lipitor and Imdur.  Hydralazine resumed .        Renal: Creatinine 4.46. Renal consult was called this AM. Dr. Cook will see. Renal sonogram Monday. Agree bicarb infusion for a while.       CKD V appears chronic. Has outside nephrologist. Creatinine here is 4.47.  Today worse at 5.16.     Stable on tele, PT eval.

## 2023-02-22 NOTE — PROGRESS NOTE ADULT - SUBJECTIVE AND OBJECTIVE BOX
CARDIOLOGY     PROGRESS  NOTE   ________________________________________________    CHIEF COMPLAINT:Patient is a 83y old  Male who presents with a chief complaint of hip fx (19 Feb 2023 14:17)  no complain  	  REVIEW OF SYSTEMS:  CONSTITUTIONAL: No fever, weight loss, or fatigue  EYES: No eye pain, visual disturbances, or discharge  ENT:  No difficulty hearing, tinnitus, vertigo; No sinus or throat pain  NECK: No pain or stiffness  RESPIRATORY: No cough, wheezing, chills or hemoptysis; + Shortness of Breath  CARDIOVASCULAR: No chest pain, palpitations, passing out, dizziness, or leg swelling  GASTROINTESTINAL: No abdominal or epigastric pain. No nausea, vomiting, or hematemesis; No diarrhea or constipation. No melena or hematochezia.  GENITOURINARY: No dysuria, frequency, hematuria, or incontinence  NEUROLOGICAL: No headaches, memory loss, loss of strength, numbness, or tremors  SKIN: No itching, burning, rashes, or lesions   LYMPH Nodes: No enlarged glands  ENDOCRINE: No heat or cold intolerance; No hair loss  MUSCULOSKELETAL: No joint pain or swelling; No muscle, back, or extremity pain  PSYCHIATRIC: No depression, anxiety, mood swings, or difficulty sleeping  HEME/LYMPH: No easy bruising, or bleeding gums  ALLERGY AND IMMUNOLOGIC: No hives or eczema	    [ ] All others negative	  [ ] Unable to obtain    PHYSICAL EXAM:  T(C): 36.5 (02-22-23 @ 05:50), Max: 36.9 (02-21-23 @ 17:40)  HR: 64 (02-22-23 @ 05:50) (64 - 108)  BP: 135/78 (02-22-23 @ 05:50) (98/72 - 188/74)  RR: 18 (02-22-23 @ 05:50) (18 - 18)  SpO2: 97% (02-22-23 @ 05:50) (93% - 98%)  Wt(kg): --  I&O's Summary    21 Feb 2023 07:01  -  22 Feb 2023 07:00  --------------------------------------------------------  IN: 1847 mL / OUT: 450 mL / NET: 1397 mL        Appearance: Normal	  HEENT:   Normal oral mucosa, PERRL, EOMI	  Lymphatic: No lymphadenopathy  Cardiovascular: Normal S1 S2, No JVD, + murmurs, No edema  Respiratory: Lungs clear to auscultation	  Psychiatry: A & O x 3, Mood & affect appropriate  Gastrointestinal:  Soft, Non-tender, + BS	  Skin: No rashes, No ecchymoses, No cyanosis	  Neurologic: Non-focal  Extremities: Normal range of motion, No clubbing, cyanosis or edema  Vascular: Peripheral pulses palpable 2+ bilaterally    MEDICATIONS  (STANDING):  acetaminophen     Tablet .. 975 milliGRAM(s) Oral every 8 hours  aMIOdarone    Tablet 200 milliGRAM(s) Oral two times a day  atorvastatin 40 milliGRAM(s) Oral at bedtime  azithromycin  IVPB 500 milliGRAM(s) IV Intermittent every 24 hours  brimonidine 0.2% Ophthalmic Solution 1 Drop(s) Both EYES two times a day  cefTRIAXone   IVPB 1000 milliGRAM(s) IV Intermittent every 24 hours  chlorhexidine 2% Cloths 1 Application(s) Topical daily  dextrose 5%. 1000 milliLiter(s) (100 mL/Hr) IV Continuous <Continuous>  dextrose 5%. 1000 milliLiter(s) (50 mL/Hr) IV Continuous <Continuous>  dextrose 50% Injectable 25 Gram(s) IV Push once  dextrose 50% Injectable 12.5 Gram(s) IV Push once  dextrose 50% Injectable 25 Gram(s) IV Push once  dorzolamide 2%/timolol 0.5% Ophthalmic Solution 1 Drop(s) Both EYES two times a day  glucagon  Injectable 1 milliGRAM(s) IntraMuscular once  heparin  Infusion.  Unit(s)/Hr (9.5 mL/Hr) IV Continuous <Continuous>  insulin lispro (ADMELOG) corrective regimen sliding scale   SubCutaneous three times a day before meals  insulin lispro (ADMELOG) corrective regimen sliding scale   SubCutaneous at bedtime  isosorbide   dinitrate Tablet (ISORDIL) 10 milliGRAM(s) Oral three times a day  multivitamin 1 Tablet(s) Oral daily  pantoprazole    Tablet 40 milliGRAM(s) Oral before breakfast  polyethylene glycol 3350 17 Gram(s) Oral at bedtime  prednisoLONE acetate 1% Suspension 1 Drop(s) Left EYE three times a day  senna 2 Tablet(s) Oral at bedtime  sodium bicarbonate  Infusion 0.049 mEq/kG/Hr (50 mL/Hr) IV Continuous <Continuous>      TELEMETRY: 	    ECG:  	  RADIOLOGY:  OTHER: 	  	  LABS:	 	    CARDIAC MARKERS:                                8.9    10.70 )-----------( 169      ( 22 Feb 2023 06:16 )             29.3     02-22    139  |  108  |  92<H>  ----------------------------<  188<H>  4.4   |  15<L>  |  5.16<H>    Ca    8.3<L>      22 Feb 2023 06:16  Mg     2.7     02-22    TPro  5.8<L>  /  Alb  2.8<L>  /  TBili  0.3  /  DBili  x   /  AST  25  /  ALT  <5<L>  /  AlkPhos  53  02-22    proBNP: Serum Pro-Brain Natriuretic Peptide: 6136 pg/mL (02-17 @ 02:45)    Lipid Profile:   HgA1c:   TSH: Thyroid Stimulating Hormone, Serum: 0.71 uIU/mL (02-21 @ 16:38)  Thyroid Stimulating Hormone, Serum: 1.55 uIU/mL (02-21 @ 07:17)    PTT - ( 22 Feb 2023 06:16 )  PTT:54.7 sec    1- RASHAAD on ckd suspected  2- HTN   3- DM   4- hyperlipidemia   5- proteinuria  6- pneumonia  7- acidosis     unclear creatinine in past suspect ckd   s/p hip surgery   hold losartan  change sodium bicarbonate to drip 1/2 NS + 75 meq nahco3- /L @ 40 cc/hr   abx per primary team  cont with lipitor   hydralazine to cont as above  uo is low trend uo   tele monitor    strict I/O   cr worsened may need dialysis during this admission   trend lytes     Assessment and plan  ---------------------------  82M PMHx of DM presenting with right hip pain s/p mechanical fall today. Describes slipping in the kitchen and falling on his right side w/ right sided hip pain mild. Denies any LOC. Denies any antiplatelet or AC.   pt with hx of htn, ckd, no known hx of cad, who is very active with no cardiac complain ,s/p tripped and fall, pt was admitted to [facility] had mild elevation of trop with no sig jump , no cpk and MB was drawn. pt with no chest pain.  had a long discussion with son health care proxy the results of blood test, echo discussed with length with him the possibility of sig CAD and risk of MI and death  in addition to other medical co morbidity  renal disease explained to the health care proxy son  and son claims father  can not live with hip fx and not  being able to walk and understands the risk.  continue beta blocker and hydralazine , keep hgb at least >8  increase beta blocker to 25 mg po bid  PAF/ flutter in flutter last night  start on amio try to keep in nsr  AC if no contraindication from ortho  asa daily  lipitor  dc ivf  renal may need HD in near future  tele nsr in 60 to 70 first degree av block  decrease bp, decrease hydralazine dose  events has noted, bradycardia , pauses, metoprolol hads dcd , decrease amio   ?sss may need PPM

## 2023-02-22 NOTE — PROGRESS NOTE ADULT - ASSESSMENT
83 yo male w/ pmhx of CKD, DM, HTN presented to the ED [facility] 2/2 due to fall. Right hip femoral fracture;  2/2 due to fall and NPO On 2/19/2023 for OR for hip surgery. noticed with abn creatinine 4.9 now at 4.4 pt and son state pt does not have ckd however, saw a nephrologist in [facility] last year once only. was given a medication does not recall what med was given.      1- RASHAAD on ckd suspected  2- HTN   3- DM   4- hyperlipidemia   5- proteinuria  6- pneumonia  7- acidosis     as per Dr. Elliott d/w primary renal outpt team discussion cr last year of 4.   s/p hip surgery   hold losartan for now  monitor closely for uremia    sodium bicarbonate drip 1/2 NS + 75 meq nahco3- /L @ 40 cc/hr   abx per primary team  cont with lipitor   hydralazine to cont as above  trend uo   tele monitor    strict I/O   trend meagan   d/w pt son

## 2023-02-22 NOTE — PROVIDER CONTACT NOTE (OTHER) - BACKGROUND
At approximately 0510, RN was notified at that later time that apTT was drawn through the same arm as heparin infusion. At approximately 0510, RN was notified by PCA at that later time that apTT was drawn through the same arm as heparin infusion.
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
## 2023-02-23 DIAGNOSIS — D62 ACUTE POSTHEMORRHAGIC ANEMIA: ICD-10-CM

## 2023-02-23 DIAGNOSIS — N17.9 ACUTE KIDNEY FAILURE, UNSPECIFIED: ICD-10-CM

## 2023-02-23 DIAGNOSIS — S72.001A FRACTURE OF UNSPECIFIED PART OF NECK OF RIGHT FEMUR, INITIAL ENCOUNTER FOR CLOSED FRACTURE: ICD-10-CM

## 2023-02-23 DIAGNOSIS — E11.22 TYPE 2 DIABETES MELLITUS WITH DIABETIC CHRONIC KIDNEY DISEASE: ICD-10-CM

## 2023-02-23 DIAGNOSIS — N32.0 BLADDER-NECK OBSTRUCTION: ICD-10-CM

## 2023-02-23 DIAGNOSIS — I50.22 CHRONIC SYSTOLIC (CONGESTIVE) HEART FAILURE: ICD-10-CM

## 2023-02-23 DIAGNOSIS — I13.0 HYPERTENSIVE HEART AND CHRONIC KIDNEY DISEASE WITH HEART FAILURE AND STAGE 1 THROUGH STAGE 4 CHRONIC KIDNEY DISEASE, OR UNSPECIFIED CHRONIC KIDNEY DISEASE: ICD-10-CM

## 2023-02-23 DIAGNOSIS — U07.1 COVID-19: ICD-10-CM

## 2023-02-23 DIAGNOSIS — N18.9 CHRONIC KIDNEY DISEASE, UNSPECIFIED: ICD-10-CM

## 2023-02-23 DIAGNOSIS — Z79.84 LONG TERM (CURRENT) USE OF ORAL HYPOGLYCEMIC DRUGS: ICD-10-CM

## 2023-02-23 DIAGNOSIS — I42.9 CARDIOMYOPATHY, UNSPECIFIED: ICD-10-CM

## 2023-02-23 DIAGNOSIS — K29.70 GASTRITIS, UNSPECIFIED, WITHOUT BLEEDING: ICD-10-CM

## 2023-02-23 DIAGNOSIS — J12.82 PNEUMONIA DUE TO CORONAVIRUS DISEASE 2019: ICD-10-CM

## 2023-02-23 DIAGNOSIS — K20.90 ESOPHAGITIS, UNSPECIFIED WITHOUT BLEEDING: ICD-10-CM

## 2023-02-23 DIAGNOSIS — R18.8 OTHER ASCITES: ICD-10-CM

## 2023-02-23 DIAGNOSIS — N12 TUBULO-INTERSTITIAL NEPHRITIS, NOT SPECIFIED AS ACUTE OR CHRONIC: ICD-10-CM

## 2023-02-23 LAB
ANION GAP SERPL CALC-SCNC: 15 MMOL/L — SIGNIFICANT CHANGE UP (ref 5–17)
APTT BLD: 45.7 SEC — HIGH (ref 27.5–35.5)
APTT BLD: 48.2 SEC — HIGH (ref 27.5–35.5)
APTT BLD: 62.7 SEC — HIGH (ref 27.5–35.5)
BUN SERPL-MCNC: 87 MG/DL — HIGH (ref 7–23)
CALCIUM SERPL-MCNC: 8.7 MG/DL — SIGNIFICANT CHANGE UP (ref 8.4–10.5)
CHLORIDE SERPL-SCNC: 106 MMOL/L — SIGNIFICANT CHANGE UP (ref 96–108)
CO2 SERPL-SCNC: 18 MMOL/L — LOW (ref 22–31)
CREAT SERPL-MCNC: 4.87 MG/DL — HIGH (ref 0.5–1.3)
EGFR: 11 ML/MIN/1.73M2 — LOW
GLUCOSE BLDC GLUCOMTR-MCNC: 141 MG/DL — HIGH (ref 70–99)
GLUCOSE BLDC GLUCOMTR-MCNC: 160 MG/DL — HIGH (ref 70–99)
GLUCOSE BLDC GLUCOMTR-MCNC: 212 MG/DL — HIGH (ref 70–99)
GLUCOSE BLDC GLUCOMTR-MCNC: 234 MG/DL — HIGH (ref 70–99)
GLUCOSE SERPL-MCNC: 150 MG/DL — HIGH (ref 70–99)
HCT VFR BLD CALC: 29.9 % — LOW (ref 39–50)
HGB BLD-MCNC: 9.1 G/DL — LOW (ref 13–17)
MAGNESIUM SERPL-MCNC: 2.6 MG/DL — SIGNIFICANT CHANGE UP (ref 1.6–2.6)
MCHC RBC-ENTMCNC: 27 PG — SIGNIFICANT CHANGE UP (ref 27–34)
MCHC RBC-ENTMCNC: 30.4 GM/DL — LOW (ref 32–36)
MCV RBC AUTO: 88.7 FL — SIGNIFICANT CHANGE UP (ref 80–100)
NRBC # BLD: 0 /100 WBCS — SIGNIFICANT CHANGE UP (ref 0–0)
PHOSPHATE SERPL-MCNC: 3.2 MG/DL — SIGNIFICANT CHANGE UP (ref 2.5–4.5)
PLATELET # BLD AUTO: 186 K/UL — SIGNIFICANT CHANGE UP (ref 150–400)
POTASSIUM SERPL-MCNC: 4.4 MMOL/L — SIGNIFICANT CHANGE UP (ref 3.5–5.3)
POTASSIUM SERPL-SCNC: 4.4 MMOL/L — SIGNIFICANT CHANGE UP (ref 3.5–5.3)
RBC # BLD: 3.37 M/UL — LOW (ref 4.2–5.8)
RBC # FLD: 14.4 % — SIGNIFICANT CHANGE UP (ref 10.3–14.5)
SODIUM SERPL-SCNC: 139 MMOL/L — SIGNIFICANT CHANGE UP (ref 135–145)
WBC # BLD: 10.7 K/UL — HIGH (ref 3.8–10.5)
WBC # FLD AUTO: 10.7 K/UL — HIGH (ref 3.8–10.5)

## 2023-02-23 PROCEDURE — 93010 ELECTROCARDIOGRAM REPORT: CPT

## 2023-02-23 RX ORDER — METOPROLOL TARTRATE 50 MG
12.5 TABLET ORAL
Refills: 0 | Status: DISCONTINUED | OUTPATIENT
Start: 2023-02-23 | End: 2023-02-25

## 2023-02-23 RX ORDER — HALOPERIDOL DECANOATE 100 MG/ML
0.5 INJECTION INTRAMUSCULAR ONCE
Refills: 0 | Status: COMPLETED | OUTPATIENT
Start: 2023-02-23 | End: 2023-02-23

## 2023-02-23 RX ORDER — SODIUM BICARBONATE 1 MEQ/ML
1300 SYRINGE (ML) INTRAVENOUS
Refills: 0 | Status: DISCONTINUED | OUTPATIENT
Start: 2023-02-23 | End: 2023-03-01

## 2023-02-23 RX ADMIN — PANTOPRAZOLE SODIUM 40 MILLIGRAM(S): 20 TABLET, DELAYED RELEASE ORAL at 05:42

## 2023-02-23 RX ADMIN — BRIMONIDINE TARTRATE 1 DROP(S): 2 SOLUTION/ DROPS OPHTHALMIC at 05:42

## 2023-02-23 RX ADMIN — Medication 975 MILLIGRAM(S): at 06:41

## 2023-02-23 RX ADMIN — DORZOLAMIDE HYDROCHLORIDE TIMOLOL MALEATE 1 DROP(S): 20; 5 SOLUTION/ DROPS OPHTHALMIC at 05:42

## 2023-02-23 RX ADMIN — Medication 2: at 09:20

## 2023-02-23 RX ADMIN — AMIODARONE HYDROCHLORIDE 200 MILLIGRAM(S): 400 TABLET ORAL at 05:42

## 2023-02-23 RX ADMIN — Medication 1: at 17:10

## 2023-02-23 RX ADMIN — Medication 975 MILLIGRAM(S): at 05:41

## 2023-02-23 RX ADMIN — Medication 50 MILLIGRAM(S): at 13:15

## 2023-02-23 RX ADMIN — Medication 50 MEQ/KG/HR: at 21:53

## 2023-02-23 RX ADMIN — CHLORHEXIDINE GLUCONATE 1 APPLICATION(S): 213 SOLUTION TOPICAL at 13:17

## 2023-02-23 RX ADMIN — HEPARIN SODIUM 1000 UNIT(S)/HR: 5000 INJECTION INTRAVENOUS; SUBCUTANEOUS at 08:17

## 2023-02-23 RX ADMIN — AMIODARONE HYDROCHLORIDE 200 MILLIGRAM(S): 400 TABLET ORAL at 17:09

## 2023-02-23 RX ADMIN — Medication 975 MILLIGRAM(S): at 21:54

## 2023-02-23 RX ADMIN — ATORVASTATIN CALCIUM 40 MILLIGRAM(S): 80 TABLET, FILM COATED ORAL at 21:55

## 2023-02-23 RX ADMIN — HALOPERIDOL DECANOATE 0.5 MILLIGRAM(S): 100 INJECTION INTRAMUSCULAR at 23:52

## 2023-02-23 RX ADMIN — HALOPERIDOL DECANOATE 0.5 MILLIGRAM(S): 100 INJECTION INTRAMUSCULAR at 03:16

## 2023-02-23 RX ADMIN — Medication 50 MILLIGRAM(S): at 21:54

## 2023-02-23 RX ADMIN — HEPARIN SODIUM 1000 UNIT(S)/HR: 5000 INJECTION INTRAVENOUS; SUBCUTANEOUS at 15:01

## 2023-02-23 RX ADMIN — Medication 1 DROP(S): at 05:43

## 2023-02-23 RX ADMIN — Medication 50 MILLIGRAM(S): at 05:42

## 2023-02-23 RX ADMIN — ISOSORBIDE MONONITRATE 30 MILLIGRAM(S): 60 TABLET, EXTENDED RELEASE ORAL at 13:15

## 2023-02-23 RX ADMIN — HEPARIN SODIUM 1150 UNIT(S)/HR: 5000 INJECTION INTRAVENOUS; SUBCUTANEOUS at 22:08

## 2023-02-23 RX ADMIN — Medication 12.5 MILLIGRAM(S): at 17:09

## 2023-02-23 NOTE — PROGRESS NOTE ADULT - SUBJECTIVE AND OBJECTIVE BOX
Orthopaedic Surgery Progress Note    Subjective:   Patient seen and examined this AM. No acute orthopaedic events overnight. Pain controlled.      Physical Exam:    Gen: awake, alert, NAD  Resp: no increased work of breathing  RLE:  Aquacel dressing c/d/i   +EHL/FHL/TA/GS  SILT S/S/SP/DP  +DP/PT Pulses  Compartments soft  No calf TTP                LABS:                        8.9    10.70 )-----------( 169      ( 22 Feb 2023 06:16 )             29.3     02-22    139  |  108  |  92<H>  ----------------------------<  188<H>  4.4   |  15<L>  |  5.16<H>    Ca    8.3<L>      22 Feb 2023 06:16  Mg     2.7     02-22    TPro  5.8<L>  /  Alb  2.8<L>  /  TBili  0.3  /  DBili  x   /  AST  25  /  ALT  <5<L>  /  AlkPhos  53  02-22    PTT - ( 22 Feb 2023 12:08 )  PTT:55.2 sec      VITAL SIGNS:  T(C): 36.6 (02-23-23 @ 01:25), Max: 36.8 (02-22-23 @ 14:04)  HR: 66 (02-23-23 @ 01:25) (54 - 67)  BP: 144/74 (02-23-23 @ 01:25) (130/76 - 188/74)  RR: 18 (02-23-23 @ 01:25) (18 - 18)  SpO2: 98% (02-23-23 @ 01:25) (95% - 98%)

## 2023-02-23 NOTE — PROGRESS NOTE ADULT - SUBJECTIVE AND OBJECTIVE BOX
[facility]   [phone]  RENAL FOLLOW UP NOTE  --------------------------------------------------------------------------------  Chief Complaint:    24 hour events/subjective:    seen earlier   communicative   but overall still somewhat confused     PAST HISTORY  --------------------------------------------------------------------------------  No significant changes to PMH, PSH, FHx, SHx, unless otherwise noted    ALLERGIES & MEDICATIONS  --------------------------------------------------------------------------------  Allergies    No Known Allergies    Intolerances      Standing Inpatient Medications  acetaminophen     Tablet .. 975 milliGRAM(s) Oral every 8 hours  aMIOdarone    Tablet 200 milliGRAM(s) Oral two times a day  atorvastatin 40 milliGRAM(s) Oral at bedtime  brimonidine 0.2% Ophthalmic Solution 1 Drop(s) Both EYES two times a day  chlorhexidine 2% Cloths 1 Application(s) Topical daily  dextrose 5%. 1000 milliLiter(s) IV Continuous <Continuous>  dextrose 5%. 1000 milliLiter(s) IV Continuous <Continuous>  dextrose 50% Injectable 25 Gram(s) IV Push once  dextrose 50% Injectable 12.5 Gram(s) IV Push once  dextrose 50% Injectable 25 Gram(s) IV Push once  dorzolamide 2%/timolol 0.5% Ophthalmic Solution 1 Drop(s) Both EYES two times a day  glucagon  Injectable 1 milliGRAM(s) IntraMuscular once  heparin  Infusion.  Unit(s)/Hr IV Continuous <Continuous>  hydrALAZINE 50 milliGRAM(s) Oral three times a day  insulin lispro (ADMELOG) corrective regimen sliding scale   SubCutaneous three times a day before meals  insulin lispro (ADMELOG) corrective regimen sliding scale   SubCutaneous at bedtime  isosorbide   mononitrate ER Tablet (IMDUR) 30 milliGRAM(s) Oral daily  metoprolol tartrate 12.5 milliGRAM(s) Oral two times a day  multivitamin 1 Tablet(s) Oral daily  pantoprazole    Tablet 40 milliGRAM(s) Oral before breakfast  polyethylene glycol 3350 17 Gram(s) Oral at bedtime  prednisoLONE acetate 1% Suspension 1 Drop(s) Left EYE three times a day  senna 2 Tablet(s) Oral at bedtime  sodium bicarbonate 1300 milliGRAM(s) Oral two times a day    PRN Inpatient Medications  dextrose Oral Gel 15 Gram(s) Oral once PRN  heparin   Injectable 4700 Unit(s) IV Push every 6 hours PRN  traMADol 25 milliGRAM(s) Oral every 6 hours PRN      REVIEW OF SYSTEMS  --------------------------------------------------------------------------------      VITALS/PHYSICAL EXAM  --------------------------------------------------------------------------------  T(C): 36.4 (02-23-23 @ 20:38), Max: 36.6 (02-23-23 @ 01:25)  HR: 72 (02-23-23 @ 20:38) (63 - 94)  BP: 167/62 (02-23-23 @ 20:38) (143/84 - 181/73)  RR: 18 (02-23-23 @ 20:38) (18 - 18)  SpO2: 95% (02-23-23 @ 20:38) (95% - 99%)  Wt(kg): --        02-22-23 @ 07:01  -  02-23-23 @ 07:00  --------------------------------------------------------  IN: 2117 mL / OUT: 990 mL / NET: 1127 mL    02-23-23 @ 07:01  -  02-23-23 @ 22:39  --------------------------------------------------------  IN: 1433 mL / OUT: 1075 mL / NET: 358 mL      Physical Exam:  	      Gen:  responsive communicative today   	Pulm: Decreased breath sounds b/l bases. no rales or ronchi or wheezing  	CV: RRR, S1/S2. no rub  	Abd: +BS, soft, nontender/nondistended  	: No bladder distention               Extremity: No cyanosis, no edema    LABS/STUDIES  --------------------------------------------------------------------------------              9.1    10.70 >-----------<  186      [02-23-23 @ 07:13]              29.9     139  |  106  |  87  ----------------------------<  150      [02-23-23 @ 07:15]  4.4   |  18  |  4.87        Ca     8.7     [02-23-23 @ 07:15]      Mg     2.6     [02-23-23 @ 07:15]      Phos  3.2     [02-23-23 @ 07:15]    TPro  5.8  /  Alb  2.8  /  TBili  0.3  /  DBili  x   /  AST  25  /  ALT  <5  /  AlkPhos  53  [02-22-23 @ 06:16]      PTT: 48.2       [02-23-23 @ 21:40]      Creatinine Trend:  SCr 4.87 [02-23 @ 07:15]  SCr 5.16 [02-22 @ 06:16]  SCr 5.04 [02-21 @ 16:38]  SCr 4.47 [02-20 @ 05:39]  SCr 4.42 [02-19 @ 23:30]              Urinalysis - [02-16-23 @ 22:15]      Color Yellow / Appearance Clear / SG 1.015 / pH 6.0      Gluc 1000 / Ketone Trace  / Bili Negative / Urobili Negative       Blood Small / Protein 500 / Leuk Est Negative / Nitrite Negative      RBC 0-2 / WBC Negative / Hyaline  / Gran  / Sq Epi  / Non Sq Epi Occasional / Bacteria Occasional      PTH -- (Ca 8.6)      [02-20-23 @ 05:39]   265  TSH 0.71      [02-21-23 @ 16:38]

## 2023-02-23 NOTE — PROGRESS NOTE ADULT - ASSESSMENT
81 yo male w/ pmhx of CKD, DM, HTN presented to the ED BIBEMS 2/2 due to fall. Right hip femoral fracture;  2/2 due to fall and NPO On 2/19/2023 for OR for hip surgery. noticed with abn creatinine 4.9 now at 4.4 pt and son state pt does not have ckd however, saw a nephrologist in [facility] last year once only. was given a medication does not recall what med was given.      1- RASHAAD on ckd suspected  2- HTN   3- DM   4- hyperlipidemia   5- proteinuria  6- pneumonia  7- acidosis     as per Dr. Elliott d/w primary renal outpt team discussion cr last year of 4.   s/p hip surgery   hold losartan for now  monitor closely for uremia  cr seems to be improving slowly. he is also non oliguric therefore holding off on hd   sodium bicarbonate change to 1300 mg po bid from iv drip   abx per primary team  cont with lipitor   hydralazine to cont as above  trend uo   tele monitor    strict I/O   trend meagan   d/w pt son at bedside

## 2023-02-23 NOTE — PROGRESS NOTE ADULT - SUBJECTIVE AND OBJECTIVE BOX
INTERVAL HPI/OVERNIGHT EVENTS:  Pt seen and examined at bedside.     Allergies/Intolerance: No Known Allergies      MEDICATIONS  (STANDING):  acetaminophen     Tablet .. 975 milliGRAM(s) Oral every 8 hours  aMIOdarone    Tablet 200 milliGRAM(s) Oral two times a day  atorvastatin 40 milliGRAM(s) Oral at bedtime  brimonidine 0.2% Ophthalmic Solution 1 Drop(s) Both EYES two times a day  chlorhexidine 2% Cloths 1 Application(s) Topical daily  dextrose 5%. 1000 milliLiter(s) (100 mL/Hr) IV Continuous <Continuous>  dextrose 5%. 1000 milliLiter(s) (50 mL/Hr) IV Continuous <Continuous>  dextrose 50% Injectable 25 Gram(s) IV Push once  dextrose 50% Injectable 12.5 Gram(s) IV Push once  dextrose 50% Injectable 25 Gram(s) IV Push once  dorzolamide 2%/timolol 0.5% Ophthalmic Solution 1 Drop(s) Both EYES two times a day  glucagon  Injectable 1 milliGRAM(s) IntraMuscular once  heparin  Infusion.  Unit(s)/Hr (9.5 mL/Hr) IV Continuous <Continuous>  hydrALAZINE 50 milliGRAM(s) Oral three times a day  insulin lispro (ADMELOG) corrective regimen sliding scale   SubCutaneous three times a day before meals  insulin lispro (ADMELOG) corrective regimen sliding scale   SubCutaneous at bedtime  isosorbide   mononitrate ER Tablet (IMDUR) 30 milliGRAM(s) Oral daily  multivitamin 1 Tablet(s) Oral daily  pantoprazole    Tablet 40 milliGRAM(s) Oral before breakfast  polyethylene glycol 3350 17 Gram(s) Oral at bedtime  prednisoLONE acetate 1% Suspension 1 Drop(s) Left EYE three times a day  senna 2 Tablet(s) Oral at bedtime  sodium bicarbonate  Infusion 0.049 mEq/kG/Hr (50 mL/Hr) IV Continuous <Continuous>    MEDICATIONS  (PRN):  dextrose Oral Gel 15 Gram(s) Oral once PRN Blood Glucose LESS THAN 70 milliGRAM(s)/deciliter  heparin   Injectable 4700 Unit(s) IV Push every 6 hours PRN For aPTT less than 40  traMADol 25 milliGRAM(s) Oral every 6 hours PRN Mild Pain (1 - 3)        ROS: all systems reviewed and wnl      PHYSICAL EXAMINATION:  Vital Signs Last 24 Hrs  T(C): 36.5 (23 Feb 2023 05:00), Max: 36.8 (22 Feb 2023 14:04)  T(F): 97.7 (23 Feb 2023 05:00), Max: 98.3 (22 Feb 2023 14:04)  HR: 94 (23 Feb 2023 05:00) (54 - 94)  BP: 143/84 (23 Feb 2023 05:00) (130/76 - 181/79)  BP(mean): --  RR: 18 (23 Feb 2023 05:00) (18 - 18)  SpO2: 97% (23 Feb 2023 05:00) (95% - 98%)    Parameters below as of 23 Feb 2023 05:00  Patient On (Oxygen Delivery Method): room air      CAPILLARY BLOOD GLUCOSE      POCT Blood Glucose.: 157 mg/dL (22 Feb 2023 21:40)  POCT Blood Glucose.: 222 mg/dL (22 Feb 2023 16:48)  POCT Blood Glucose.: 206 mg/dL (22 Feb 2023 12:50)  POCT Blood Glucose.: 196 mg/dL (22 Feb 2023 08:34)      02-22 @ 07:01  -  02-23 @ 07:00  --------------------------------------------------------  IN: 2117 mL / OUT: 990 mL / NET: 1127 mL    02-23 @ 07:01  -  02-23 @ 08:31  --------------------------------------------------------  IN: 0 mL / OUT: 225 mL / NET: -225 mL        GENERAL: stable, in bed, alert, follow simple commands, no fevers or cough.   NECK: supple, No JVD  CHEST/LUNG: clear to auscultation bilaterally; no rales, rhonchi, or wheezing b/l  HEART: normal S1, S2  ABDOMEN: BS+, soft, ND, NT   EXTREMITIES:  pulses palpable; no clubbing, cyanosis, or edema b/l LEs      LABS:                        9.1    10.70 )-----------( 186      ( 23 Feb 2023 07:13 )             29.9     02-23    139  |  106  |  87<H>  ----------------------------<  150<H>  4.4   |  18<L>  |  4.87<H>    Ca    8.7      23 Feb 2023 07:15  Phos  3.2     02-23  Mg     2.6     02-23    TPro  5.8<L>  /  Alb  2.8<L>  /  TBili  0.3  /  DBili  x   /  AST  25  /  ALT  <5<L>  /  AlkPhos  53  02-22    PTT - ( 23 Feb 2023 07:15 )  PTT:45.7 sec

## 2023-02-23 NOTE — PROGRESS NOTE ADULT - SUBJECTIVE AND OBJECTIVE BOX
CARDIOLOGY     PROGRESS  NOTE   ________________________________________________    CHIEF COMPLAINT:Patient is a 83y old  Male who presents with a chief complaint of hip fx (19 Feb 2023 14:17)  no complain  	  REVIEW OF SYSTEMS:  CONSTITUTIONAL: No fever, weight loss, or fatigue  EYES: No eye pain, visual disturbances, or discharge  ENT:  No difficulty hearing, tinnitus, vertigo; No sinus or throat pain  NECK: No pain or stiffness  RESPIRATORY: No cough, wheezing, chills or hemoptysis; + Shortness of Breath  CARDIOVASCULAR: No chest pain, palpitations, passing out, dizziness, or leg swelling  GASTROINTESTINAL: No abdominal or epigastric pain. No nausea, vomiting, or hematemesis; No diarrhea or constipation. No melena or hematochezia.  GENITOURINARY: No dysuria, frequency, hematuria, or incontinence  NEUROLOGICAL: No headaches, memory loss, loss of strength, numbness, or tremors  SKIN: No itching, burning, rashes, or lesions   LYMPH Nodes: No enlarged glands  ENDOCRINE: No heat or cold intolerance; No hair loss  MUSCULOSKELETAL: No joint pain or swelling; No muscle, back, or extremity pain  PSYCHIATRIC: No depression, anxiety, mood swings, or difficulty sleeping  HEME/LYMPH: No easy bruising, or bleeding gums  ALLERGY AND IMMUNOLOGIC: No hives or eczema	    [x ] All others negative	  [ ] Unable to obtain    PHYSICAL EXAM:  T(C): 36.5 (02-23-23 @ 05:00), Max: 36.8 (02-22-23 @ 14:04)  HR: 94 (02-23-23 @ 05:00) (54 - 94)  BP: 143/84 (02-23-23 @ 05:00) (130/76 - 181/79)  RR: 18 (02-23-23 @ 05:00) (18 - 18)  SpO2: 97% (02-23-23 @ 05:00) (95% - 98%)  Wt(kg): --  I&O's Summary    22 Feb 2023 07:01  -  23 Feb 2023 07:00  --------------------------------------------------------  IN: 2117 mL / OUT: 990 mL / NET: 1127 mL    23 Feb 2023 07:01  -  23 Feb 2023 09:13  --------------------------------------------------------  IN: 0 mL / OUT: 225 mL / NET: -225 mL        Appearance: Normal	  HEENT:   Normal oral mucosa, PERRL, EOMI	  Lymphatic: No lymphadenopathy  Cardiovascular: Normal S1 S2, No JVD, + murmurs, No edema  Respiratory: rhonchi  Psychiatry: A & O x 3, Mood & affect appropriate  Gastrointestinal:  Soft, Non-tender, + BS	  Skin: No rashes, No ecchymoses, No cyanosis	  Extremities: Normal range of motion, No clubbing, cyanosis or edema  Vascular: Peripheral pulses palpable 2+ bilaterally    MEDICATIONS  (STANDING):  acetaminophen     Tablet .. 975 milliGRAM(s) Oral every 8 hours  aMIOdarone    Tablet 200 milliGRAM(s) Oral two times a day  atorvastatin 40 milliGRAM(s) Oral at bedtime  brimonidine 0.2% Ophthalmic Solution 1 Drop(s) Both EYES two times a day  chlorhexidine 2% Cloths 1 Application(s) Topical daily  dextrose 5%. 1000 milliLiter(s) (100 mL/Hr) IV Continuous <Continuous>  dextrose 5%. 1000 milliLiter(s) (50 mL/Hr) IV Continuous <Continuous>  dextrose 50% Injectable 25 Gram(s) IV Push once  dextrose 50% Injectable 12.5 Gram(s) IV Push once  dextrose 50% Injectable 25 Gram(s) IV Push once  dorzolamide 2%/timolol 0.5% Ophthalmic Solution 1 Drop(s) Both EYES two times a day  glucagon  Injectable 1 milliGRAM(s) IntraMuscular once  heparin  Infusion.  Unit(s)/Hr (9.5 mL/Hr) IV Continuous <Continuous>  hydrALAZINE 50 milliGRAM(s) Oral three times a day  insulin lispro (ADMELOG) corrective regimen sliding scale   SubCutaneous three times a day before meals  insulin lispro (ADMELOG) corrective regimen sliding scale   SubCutaneous at bedtime  isosorbide   mononitrate ER Tablet (IMDUR) 30 milliGRAM(s) Oral daily  multivitamin 1 Tablet(s) Oral daily  pantoprazole    Tablet 40 milliGRAM(s) Oral before breakfast  polyethylene glycol 3350 17 Gram(s) Oral at bedtime  prednisoLONE acetate 1% Suspension 1 Drop(s) Left EYE three times a day  senna 2 Tablet(s) Oral at bedtime  sodium bicarbonate  Infusion 0.049 mEq/kG/Hr (50 mL/Hr) IV Continuous <Continuous>      TELEMETRY: 	    ECG:  	  RADIOLOGY:  OTHER: 	  	  LABS:	 	    CARDIAC MARKERS:                                9.1    10.70 )-----------( 186      ( 23 Feb 2023 07:13 )             29.9     02-23    139  |  106  |  87<H>  ----------------------------<  150<H>  4.4   |  18<L>  |  4.87<H>    Ca    8.7      23 Feb 2023 07:15  Phos  3.2     02-23  Mg     2.6     02-23    TPro  5.8<L>  /  Alb  2.8<L>  /  TBili  0.3  /  DBili  x   /  AST  25  /  ALT  <5<L>  /  AlkPhos  53  02-22    proBNP: Serum Pro-Brain Natriuretic Peptide: 6136 pg/mL (02-17 @ 02:45)    Lipid Profile:   HgA1c:   TSH: Thyroid Stimulating Hormone, Serum: 0.71 uIU/mL (02-21 @ 16:38)  Thyroid Stimulating Hormone, Serum: 1.55 uIU/mL (02-21 @ 07:17)    PTT - ( 23 Feb 2023 07:15 )  PTT:45.7 sec      Assessment and plan  ---------------------------  82M PMHx of DM presenting with right hip pain s/p mechanical fall today. Describes slipping in the kitchen and falling on his right side w/ right sided hip pain mild. Denies any LOC. Denies any antiplatelet or AC.   pt with hx of htn, ckd, no known hx of cad, who is very active with no cardiac complain ,s/p tripped and fall, pt was admitted to [facility] had mild elevation of trop with no sig jump , no cpk and MB was drawn. pt with no chest pain.  had a long discussion with son health care proxy the results of blood test, echo discussed with length with him the possibility of sig CAD and risk of MI and death  in addition to other medical co morbidity  renal disease explained to the health care proxy son  and son claims father  can not live with hip fx and not  being able to walk and understands the risk.  continue beta blocker and hydralazine , keep hgb at least >8  increase beta blocker to 25 mg po bid  PAF/ flutter in flutter last night  start on amio try to keep in nsr  AC if no contraindication from ortho  asa daily  lipitor  dc ivf  renal may need HD in near future  tele nsr in 60 to 70 first degree av block  decrease bp, decrease hydralazine dose  events has noted, bradycardia , pauses, metoprolol hads dcd , decrease amio   hr is better start on lopressor 12.5 mg bid  ac for PAF  physical therapy

## 2023-02-23 NOTE — PROGRESS NOTE ADULT - ASSESSMENT
83y Male s/p R hemiarthroplasty on 2/19/23.    - Pain control  - WBAT  - Posterior dislocation precautions  - PT/OT/OOB  - FU AM labs  - DVT ppx: SQH--ok for lovenox if indicated, as discussed with team; would defer heparin drip if possible  - Dispo: [facility]I  - Orthopaedically stable for dc when medically stable  - no further acute orthopaedic surgical intervention indicated

## 2023-02-24 LAB
ANION GAP SERPL CALC-SCNC: 12 MMOL/L — SIGNIFICANT CHANGE UP (ref 5–17)
APTT BLD: 51.2 SEC — HIGH (ref 27.5–35.5)
APTT BLD: 65.9 SEC — HIGH (ref 27.5–35.5)
APTT BLD: 68.2 SEC — HIGH (ref 27.5–35.5)
APTT BLD: 81.6 SEC — HIGH (ref 27.5–35.5)
BUN SERPL-MCNC: 77 MG/DL — HIGH (ref 7–23)
CALCIUM SERPL-MCNC: 8.5 MG/DL — SIGNIFICANT CHANGE UP (ref 8.4–10.5)
CHLORIDE SERPL-SCNC: 108 MMOL/L — SIGNIFICANT CHANGE UP (ref 96–108)
CO2 SERPL-SCNC: 19 MMOL/L — LOW (ref 22–31)
CREAT SERPL-MCNC: 4.65 MG/DL — HIGH (ref 0.5–1.3)
EGFR: 12 ML/MIN/1.73M2 — LOW
GLUCOSE BLDC GLUCOMTR-MCNC: 178 MG/DL — HIGH (ref 70–99)
GLUCOSE BLDC GLUCOMTR-MCNC: 194 MG/DL — HIGH (ref 70–99)
GLUCOSE BLDC GLUCOMTR-MCNC: 211 MG/DL — HIGH (ref 70–99)
GLUCOSE BLDC GLUCOMTR-MCNC: 234 MG/DL — HIGH (ref 70–99)
GLUCOSE SERPL-MCNC: 210 MG/DL — HIGH (ref 70–99)
HCT VFR BLD CALC: 27.3 % — LOW (ref 39–50)
HGB BLD-MCNC: 8.2 G/DL — LOW (ref 13–17)
MAGNESIUM SERPL-MCNC: 2.4 MG/DL — SIGNIFICANT CHANGE UP (ref 1.6–2.6)
MCHC RBC-ENTMCNC: 27.3 PG — SIGNIFICANT CHANGE UP (ref 27–34)
MCHC RBC-ENTMCNC: 30 GM/DL — LOW (ref 32–36)
MCV RBC AUTO: 91 FL — SIGNIFICANT CHANGE UP (ref 80–100)
NRBC # BLD: 0 /100 WBCS — SIGNIFICANT CHANGE UP (ref 0–0)
PHOSPHATE SERPL-MCNC: 3.2 MG/DL — SIGNIFICANT CHANGE UP (ref 2.5–4.5)
PLATELET # BLD AUTO: 214 K/UL — SIGNIFICANT CHANGE UP (ref 150–400)
POTASSIUM SERPL-MCNC: 4.5 MMOL/L — SIGNIFICANT CHANGE UP (ref 3.5–5.3)
POTASSIUM SERPL-SCNC: 4.5 MMOL/L — SIGNIFICANT CHANGE UP (ref 3.5–5.3)
RBC # BLD: 3 M/UL — LOW (ref 4.2–5.8)
RBC # FLD: 14.6 % — HIGH (ref 10.3–14.5)
SODIUM SERPL-SCNC: 139 MMOL/L — SIGNIFICANT CHANGE UP (ref 135–145)
WBC # BLD: 9.09 K/UL — SIGNIFICANT CHANGE UP (ref 3.8–10.5)
WBC # FLD AUTO: 9.09 K/UL — SIGNIFICANT CHANGE UP (ref 3.8–10.5)

## 2023-02-24 RX ORDER — HEPARIN SODIUM 5000 [USP'U]/ML
INJECTION INTRAVENOUS; SUBCUTANEOUS
Qty: 25000 | Refills: 0 | Status: DISCONTINUED | OUTPATIENT
Start: 2023-02-24 | End: 2023-02-24

## 2023-02-24 RX ORDER — APIXABAN 2.5 MG/1
2.5 TABLET, FILM COATED ORAL
Refills: 0 | Status: DISCONTINUED | OUTPATIENT
Start: 2023-02-24 | End: 2023-02-24

## 2023-02-24 RX ORDER — ISOSORBIDE MONONITRATE 60 MG/1
60 TABLET, EXTENDED RELEASE ORAL DAILY
Refills: 0 | Status: DISCONTINUED | OUTPATIENT
Start: 2023-02-24 | End: 2023-03-01

## 2023-02-24 RX ORDER — HEPARIN SODIUM 5000 [USP'U]/ML
1050 INJECTION INTRAVENOUS; SUBCUTANEOUS
Qty: 25000 | Refills: 0 | Status: DISCONTINUED | OUTPATIENT
Start: 2023-02-24 | End: 2023-02-28

## 2023-02-24 RX ORDER — HEPARIN SODIUM 5000 [USP'U]/ML
4700 INJECTION INTRAVENOUS; SUBCUTANEOUS EVERY 6 HOURS
Refills: 0 | Status: DISCONTINUED | OUTPATIENT
Start: 2023-02-24 | End: 2023-02-28

## 2023-02-24 RX ORDER — HEPARIN SODIUM 5000 [USP'U]/ML
4700 INJECTION INTRAVENOUS; SUBCUTANEOUS EVERY 6 HOURS
Refills: 0 | Status: DISCONTINUED | OUTPATIENT
Start: 2023-02-24 | End: 2023-02-24

## 2023-02-24 RX ADMIN — TRAMADOL HYDROCHLORIDE 25 MILLIGRAM(S): 50 TABLET ORAL at 11:47

## 2023-02-24 RX ADMIN — CHLORHEXIDINE GLUCONATE 1 APPLICATION(S): 213 SOLUTION TOPICAL at 12:00

## 2023-02-24 RX ADMIN — BRIMONIDINE TARTRATE 1 DROP(S): 2 SOLUTION/ DROPS OPHTHALMIC at 05:23

## 2023-02-24 RX ADMIN — Medication 50 MILLIGRAM(S): at 13:37

## 2023-02-24 RX ADMIN — Medication 2: at 11:49

## 2023-02-24 RX ADMIN — Medication 975 MILLIGRAM(S): at 05:24

## 2023-02-24 RX ADMIN — HEPARIN SODIUM 1050 UNIT(S)/HR: 5000 INJECTION INTRAVENOUS; SUBCUTANEOUS at 13:37

## 2023-02-24 RX ADMIN — Medication 1 TABLET(S): at 11:47

## 2023-02-24 RX ADMIN — Medication 1: at 09:21

## 2023-02-24 RX ADMIN — ISOSORBIDE MONONITRATE 60 MILLIGRAM(S): 60 TABLET, EXTENDED RELEASE ORAL at 11:47

## 2023-02-24 RX ADMIN — Medication 12.5 MILLIGRAM(S): at 17:06

## 2023-02-24 RX ADMIN — HEPARIN SODIUM 1050 UNIT(S)/HR: 5000 INJECTION INTRAVENOUS; SUBCUTANEOUS at 17:37

## 2023-02-24 RX ADMIN — TRAMADOL HYDROCHLORIDE 25 MILLIGRAM(S): 50 TABLET ORAL at 12:47

## 2023-02-24 RX ADMIN — Medication 1300 MILLIGRAM(S): at 05:26

## 2023-02-24 RX ADMIN — DORZOLAMIDE HYDROCHLORIDE TIMOLOL MALEATE 1 DROP(S): 20; 5 SOLUTION/ DROPS OPHTHALMIC at 05:23

## 2023-02-24 RX ADMIN — DORZOLAMIDE HYDROCHLORIDE TIMOLOL MALEATE 1 DROP(S): 20; 5 SOLUTION/ DROPS OPHTHALMIC at 17:15

## 2023-02-24 RX ADMIN — Medication 975 MILLIGRAM(S): at 23:09

## 2023-02-24 RX ADMIN — SENNA PLUS 2 TABLET(S): 8.6 TABLET ORAL at 23:07

## 2023-02-24 RX ADMIN — Medication 1 DROP(S): at 14:51

## 2023-02-24 RX ADMIN — BRIMONIDINE TARTRATE 1 DROP(S): 2 SOLUTION/ DROPS OPHTHALMIC at 17:16

## 2023-02-24 RX ADMIN — Medication 1 DROP(S): at 05:24

## 2023-02-24 RX ADMIN — AMIODARONE HYDROCHLORIDE 200 MILLIGRAM(S): 400 TABLET ORAL at 17:06

## 2023-02-24 RX ADMIN — PANTOPRAZOLE SODIUM 40 MILLIGRAM(S): 20 TABLET, DELAYED RELEASE ORAL at 05:26

## 2023-02-24 RX ADMIN — Medication 2: at 17:07

## 2023-02-24 RX ADMIN — Medication 12.5 MILLIGRAM(S): at 05:25

## 2023-02-24 RX ADMIN — POLYETHYLENE GLYCOL 3350 17 GRAM(S): 17 POWDER, FOR SOLUTION ORAL at 23:07

## 2023-02-24 RX ADMIN — AMIODARONE HYDROCHLORIDE 200 MILLIGRAM(S): 400 TABLET ORAL at 05:26

## 2023-02-24 RX ADMIN — Medication 1 DROP(S): at 23:10

## 2023-02-24 RX ADMIN — Medication 50 MILLIGRAM(S): at 23:08

## 2023-02-24 RX ADMIN — Medication 1300 MILLIGRAM(S): at 17:06

## 2023-02-24 RX ADMIN — HEPARIN SODIUM 1050 UNIT(S)/HR: 5000 INJECTION INTRAVENOUS; SUBCUTANEOUS at 05:26

## 2023-02-24 RX ADMIN — ATORVASTATIN CALCIUM 40 MILLIGRAM(S): 80 TABLET, FILM COATED ORAL at 23:07

## 2023-02-24 RX ADMIN — Medication 975 MILLIGRAM(S): at 14:46

## 2023-02-24 RX ADMIN — HEPARIN SODIUM 1050 UNIT(S)/HR: 5000 INJECTION INTRAVENOUS; SUBCUTANEOUS at 21:01

## 2023-02-24 RX ADMIN — Medication 975 MILLIGRAM(S): at 13:36

## 2023-02-24 RX ADMIN — Medication 50 MILLIGRAM(S): at 05:26

## 2023-02-24 NOTE — PROGRESS NOTE ADULT - SUBJECTIVE AND OBJECTIVE BOX
INTERVAL HPI/OVERNIGHT EVENTS:  Pt seen and examined at bedside.     Allergies/Intolerance: No Known Allergies      MEDICATIONS  (STANDING):  acetaminophen     Tablet .. 975 milliGRAM(s) Oral every 8 hours  aMIOdarone    Tablet 200 milliGRAM(s) Oral two times a day  atorvastatin 40 milliGRAM(s) Oral at bedtime  brimonidine 0.2% Ophthalmic Solution 1 Drop(s) Both EYES two times a day  chlorhexidine 2% Cloths 1 Application(s) Topical daily  dextrose 5%. 1000 milliLiter(s) (50 mL/Hr) IV Continuous <Continuous>  dextrose 5%. 1000 milliLiter(s) (100 mL/Hr) IV Continuous <Continuous>  dextrose 50% Injectable 25 Gram(s) IV Push once  dextrose 50% Injectable 12.5 Gram(s) IV Push once  dextrose 50% Injectable 25 Gram(s) IV Push once  dorzolamide 2%/timolol 0.5% Ophthalmic Solution 1 Drop(s) Both EYES two times a day  glucagon  Injectable 1 milliGRAM(s) IntraMuscular once  heparin  Infusion.  Unit(s)/Hr (9.5 mL/Hr) IV Continuous <Continuous>  hydrALAZINE 50 milliGRAM(s) Oral three times a day  insulin lispro (ADMELOG) corrective regimen sliding scale   SubCutaneous three times a day before meals  insulin lispro (ADMELOG) corrective regimen sliding scale   SubCutaneous at bedtime  isosorbide   mononitrate ER Tablet (IMDUR) 30 milliGRAM(s) Oral daily  metoprolol tartrate 12.5 milliGRAM(s) Oral two times a day  multivitamin 1 Tablet(s) Oral daily  pantoprazole    Tablet 40 milliGRAM(s) Oral before breakfast  polyethylene glycol 3350 17 Gram(s) Oral at bedtime  prednisoLONE acetate 1% Suspension 1 Drop(s) Left EYE three times a day  senna 2 Tablet(s) Oral at bedtime  sodium bicarbonate 1300 milliGRAM(s) Oral two times a day    MEDICATIONS  (PRN):  dextrose Oral Gel 15 Gram(s) Oral once PRN Blood Glucose LESS THAN 70 milliGRAM(s)/deciliter  heparin   Injectable 4700 Unit(s) IV Push every 6 hours PRN For aPTT less than 40  traMADol 25 milliGRAM(s) Oral every 6 hours PRN Mild Pain (1 - 3)        ROS: all systems reviewed and wnl      PHYSICAL EXAMINATION:  Vital Signs Last 24 Hrs  T(C): 36.4 (24 Feb 2023 08:09), Max: 36.6 (23 Feb 2023 16:39)  T(F): 97.6 (24 Feb 2023 08:09), Max: 97.8 (23 Feb 2023 16:39)  HR: 64 (24 Feb 2023 08:09) (63 - 74)  BP: 156/67 (24 Feb 2023 08:09) (156/67 - 181/84)  BP(mean): --  RR: 18 (24 Feb 2023 08:09) (18 - 18)  SpO2: 97% (24 Feb 2023 08:09) (95% - 99%)    Parameters below as of 24 Feb 2023 08:09  Patient On (Oxygen Delivery Method): room air      CAPILLARY BLOOD GLUCOSE      POCT Blood Glucose.: 234 mg/dL (23 Feb 2023 21:42)  POCT Blood Glucose.: 160 mg/dL (23 Feb 2023 17:05)  POCT Blood Glucose.: 141 mg/dL (23 Feb 2023 13:03)  POCT Blood Glucose.: 212 mg/dL (23 Feb 2023 08:54)      02-23 @ 07:01  -  02-24 @ 07:00  --------------------------------------------------------  IN: 1772 mL / OUT: 1375 mL / NET: 397 mL        GENERAL: stable, in bed, no fevers.   NECK: supple, No JVD  CHEST/LUNG: clear to auscultation bilaterally; no rales, rhonchi, or wheezing b/l  HEART: normal S1, S2  ABDOMEN: BS+, soft, ND, NT   EXTREMITIES:  pulses palpable; no clubbing, cyanosis, or edema b/l LEs      LABS:                        9.1    10.70 )-----------( 186      ( 23 Feb 2023 07:13 )             29.9     02-24    139  |  108  |  77<H>  ----------------------------<  210<H>  4.5   |  19<L>  |  4.65<H>    Ca    8.5      24 Feb 2023 04:45  Phos  3.2     02-23  Mg     2.6     02-23      PTT - ( 24 Feb 2023 04:45 )  PTT:81.6 sec           INTERVAL HPI/OVERNIGHT EVENTS:  Pt seen and examined at bedside.     Allergies/Intolerance: No Known Allergies      MEDICATIONS  (STANDING):  acetaminophen     Tablet .. 975 milliGRAM(s) Oral every 8 hours  aMIOdarone    Tablet 200 milliGRAM(s) Oral two times a day  atorvastatin 40 milliGRAM(s) Oral at bedtime  brimonidine 0.2% Ophthalmic Solution 1 Drop(s) Both EYES two times a day  chlorhexidine 2% Cloths 1 Application(s) Topical daily  dextrose 5%. 1000 milliLiter(s) (50 mL/Hr) IV Continuous <Continuous>  dextrose 5%. 1000 milliLiter(s) (100 mL/Hr) IV Continuous <Continuous>  dextrose 50% Injectable 25 Gram(s) IV Push once  dextrose 50% Injectable 12.5 Gram(s) IV Push once  dextrose 50% Injectable 25 Gram(s) IV Push once  dorzolamide 2%/timolol 0.5% Ophthalmic Solution 1 Drop(s) Both EYES two times a day  glucagon  Injectable 1 milliGRAM(s) IntraMuscular once  heparin  Infusion.  Unit(s)/Hr (9.5 mL/Hr) IV Continuous <Continuous>  hydrALAZINE 50 milliGRAM(s) Oral three times a day  insulin lispro (ADMELOG) corrective regimen sliding scale   SubCutaneous three times a day before meals  insulin lispro (ADMELOG) corrective regimen sliding scale   SubCutaneous at bedtime  isosorbide   mononitrate ER Tablet (IMDUR) 30 milliGRAM(s) Oral daily  metoprolol tartrate 12.5 milliGRAM(s) Oral two times a day  multivitamin 1 Tablet(s) Oral daily  pantoprazole    Tablet 40 milliGRAM(s) Oral before breakfast  polyethylene glycol 3350 17 Gram(s) Oral at bedtime  prednisoLONE acetate 1% Suspension 1 Drop(s) Left EYE three times a day  senna 2 Tablet(s) Oral at bedtime  sodium bicarbonate 1300 milliGRAM(s) Oral two times a day    MEDICATIONS  (PRN):  dextrose Oral Gel 15 Gram(s) Oral once PRN Blood Glucose LESS THAN 70 milliGRAM(s)/deciliter  heparin   Injectable 4700 Unit(s) IV Push every 6 hours PRN For aPTT less than 40  traMADol 25 milliGRAM(s) Oral every 6 hours PRN Mild Pain (1 - 3)        ROS: all systems reviewed and wnl      PHYSICAL EXAMINATION:  Vital Signs Last 24 Hrs  T(C): 36.4 (24 Feb 2023 08:09), Max: 36.6 (23 Feb 2023 16:39)  T(F): 97.6 (24 Feb 2023 08:09), Max: 97.8 (23 Feb 2023 16:39)  HR: 64 (24 Feb 2023 08:09) (63 - 74)  BP: 156/67 (24 Feb 2023 08:09) (156/67 - 181/84)  BP(mean): --  RR: 18 (24 Feb 2023 08:09) (18 - 18)  SpO2: 97% (24 Feb 2023 08:09) (95% - 99%)    Parameters below as of 24 Feb 2023 08:09  Patient On (Oxygen Delivery Method): room air      CAPILLARY BLOOD GLUCOSE      POCT Blood Glucose.: 234 mg/dL (23 Feb 2023 21:42)  POCT Blood Glucose.: 160 mg/dL (23 Feb 2023 17:05)  POCT Blood Glucose.: 141 mg/dL (23 Feb 2023 13:03)  POCT Blood Glucose.: 212 mg/dL (23 Feb 2023 08:54)      02-23 @ 07:01  -  02-24 @ 07:00  --------------------------------------------------------  IN: 1772 mL / OUT: 1375 mL / NET: 397 mL        GENERAL: stable, in bed, no fevers.  no SOB or cough.  Some deleria yesterday.   NECK: supple, No JVD  CHEST/LUNG: clear to auscultation bilaterally; no rales, rhonchi, or wheezing b/l  HEART: normal S1, S2  ABDOMEN: BS+, soft, ND, NT   EXTREMITIES:  pulses palpable; no clubbing, cyanosis, or edema b/l LEs      LABS:                        9.1    10.70 )-----------( 186      ( 23 Feb 2023 07:13 )             29.9     02-24    139  |  108  |  77<H>  ----------------------------<  210<H>  4.5   |  19<L>  |  4.65<H>    Ca    8.5      24 Feb 2023 04:45  Phos  3.2     02-23  Mg     2.6     02-23      PTT - ( 24 Feb 2023 04:45 )  PTT:81.6 sec

## 2023-02-24 NOTE — PROGRESS NOTE ADULT - ASSESSMENT
83y Male s/p R hemiarthroplasty on 2/19/23.    - Pain control  - WBAT  - Posterior dislocation precautions  - PT/OT/OOB  - DVT ppx: SQH--ok for lovenox if indicated, as discussed with team; would defer heparin drip if possible  - Dispo: [facility]  - Orthopaedically stable for dc when medically stable  - No further acute orthopaedic surgical intervention indicated  83y Male s/p R hemiarthroplasty on 2/19/23.    - Pain control  - WBAT  - Posterior dislocation precautions  - PT/OT/OOB  - DVT ppx: SQH--ok for lovenox if indicated, as discussed with team; would defer heparin drip if possible  - Dispo: [facility]I  - Orthopaedically stable for dc when medically stable; no further acute orthopaedic surgical intervention indicated at this time  - Please f/u outpt w/ Dr. Callejas in 1 week, call office for appt

## 2023-02-24 NOTE — CHART NOTE - NSCHARTNOTEFT_GEN_A_CORE
MEDICINE PA NOTE    Notified by RN patient with ventricular standstill x10 seconds on tele. Patient denies any chest pain or associated symptoms during time of event, and two sons in the room also did not notice any changes. Tele strip, EKG and medications reviewed with cardiology- discontinued Lopressor 25mg bid and decreased amiodarone to 200mg bid. Also sent stat labs (TSH, K+ and Mg).    ELIZABETH Johnson  [id number]
Notified by RN that patient had an episode of Wenckebach on tele   Patient asymptomatic; vitals stable  Discussed case with cardiologist Dr. Salmeron    ICU Vital Signs Last 24 Hrs  T(C): 36.4 (24 Feb 2023 08:09), Max: 36.6 (23 Feb 2023 16:39)  T(F): 97.6 (24 Feb 2023 08:09), Max: 97.8 (23 Feb 2023 16:39)  HR: 76 (24 Feb 2023 11:37) (63 - 76)  BP: 165/86 (24 Feb 2023 11:37) (156/67 - 181/84)  BP(mean): --  ABP: --  ABP(mean): --  RR: 18 (24 Feb 2023 11:37) (18 - 18)  SpO2: 98% (24 Feb 2023 10:00) (95% - 99%)    O2 Parameters below as of 24 Feb 2023 10:00  Patient On (Oxygen Delivery Method): room air    >Patient to remain on tele per Dr. Salmeron as he may require a pacemaker   >Eliquis discontinued; patient put back on heparin drip   >STAT mag and phos ordered   >Will continue to monitor on tele    Rhonda Freed PA-C  Dept of Medicine   [id number]
ORTHO POST OP CHECK    S: Pt seen and examined. RRT called for AMS and new afib on tele. See associated note. Doing well now. Pain well controlled. Denies N/V/CP/SOB.       O:   PE:  Gen: NAD, laying comfortably in bed  Resp: Unlabored breathing  MSK: Dressing c/d/i          +EHL/FHL/TA/Gas/SOl          +DP/SP/Angeline/Saph           2+DP                          9.8    11.22 )-----------( 141      ( 19 Feb 2023 17:19 )             32.2     02-19    141  |  112<H>  |  76<H>  ----------------------------<  190<H>  4.5   |  16<L>  |  4.43<H>    Ca    8.2<L>      19 Feb 2023 17:19  Phos  4.2     02-18  Mg     2.4     02-18        Vital Signs Last 24 Hrs  T(C): 36.3 (19 Feb 2023 21:21), Max: 36.7 (19 Feb 2023 06:10)  T(F): 97.3 (19 Feb 2023 21:21), Max: 98.1 (19 Feb 2023 06:10)  HR: 96 (19 Feb 2023 21:21) (63 - 110)  BP: 166/93 (19 Feb 2023 21:21) (127/66 - 171/89)  BP(mean): 103 (19 Feb 2023 18:30) (101 - 126)  RR: 18 (19 Feb 2023 21:21) (16 - 18)  SpO2: 96% (19 Feb 2023 21:21) (92% - 100%)    Parameters below as of 19 Feb 2023 21:21  Patient On (Oxygen Delivery Method): room air      I&O's Summary    19 Feb 2023 07:01  -  19 Feb 2023 21:52  --------------------------------------------------------  IN: 525 mL / OUT: 245 mL / NET: 280 mL        A/P: 83M POD #0 s/p right hip hemiarthroplasty    - Neuro: Multimodal pain control  - Resp: IS  - GI: reg diet  - MSK: OOB, WBAT, PT/OT  - Heme: DVT PPx: SQH  - Telemtry  - FU AM labs  - Dispo planning
PATRICIO OLIVARES    Notified by RN patient with AMS postoperatively last night. Pt's son Carlos at bedside and aware of pt's condition. Found pt Aflutter/SR at HR 90's on cardiac monitor, BP stable. No c/o cp, leg pain, or sob. D-dimmer also elevated >1000, but can be post Sx or COVID19 +. New Aflutter since yesterday morning, but not on any AC's      Interventions taken     LE doppler to r/o DVT  V/Q scan to r/o PE ( Not able to order CTA of chest due to Cr >4)  C/W Heparin for DVT PPX  CCU consult called at [phone] and Dr. Gonzalez Major   Ortho consult called at [phone] and Dr Perry suggested if start AC as treatment, Lovenox can be started after 6Am today.   need cardio Dr. Salmeron consult for none sustained Aflutter.   Attending Dr. Elliott called at [phone] and still waiting call back.  cont assess and monitor  f/u with am team.                    Maverick Singh ANP-BC  Spectralink #[id number]

## 2023-02-24 NOTE — PROGRESS NOTE ADULT - ASSESSMENT
81 yo male w/ pmhx of CKD, DM, HTN presented to the ED [facility] 2/2 due to fall.        Plan: Right hip femoral fracture due to fall. Had right hip surgery. Stable in bed.  CT chest no pneumonia.  Stop all ABX.       COVID- 19 :  No evidence of Hypoxia, no treatment needed.     Elevated Troponin; continue to trend, unable to assess if any chest pain, patient is currently confused. TTE decreased EF 40 %. EKG notes inferior and septal MI. CHF, euvolemic.      DM: stable, hold all meds. SSC.  Consider low dose Lantus.  Acceptable today around 195 off Lantus.      Essential HTN: resume home medication lopressor, lipitor and Imdur.  Hydralazine resumed 50 mg tid.         Renal: Creatinine 4.87. Renal consult was called, Dr. Cook will see. Agree bicarb infusion for a while.   Creatinine one year ago was 4.10.     CKD V appears chronic. Has outside nephrologist. Creatinine here is 4.47.      Stable on tele, PT eval.  No infection.         83 yo male w/ pmhx of CKD, DM, HTN presented to the ED [facility] 2/2 due to fall.        Plan: Right hip femoral fracture due to fall. Had right hip surgery. Stable in bed.  CT chest no pneumonia.  Stop all ABX.       COVID- 19 :  No evidence of Hypoxia, no treatment needed.     Elevated Troponin; continue to trend, unable to assess if any chest pain, patient is currently confused. TTE decreased EF 40 %. EKG notes inferior and septal MI. CHF, euvolemic.      DM: stable, hold all meds. SSC.  Consider low dose Lantus.  Acceptable today around 195 off Lantus.      Essential HTN: resume home medication lopressor, lipitor and Imdur.  Hydralazine resumed 50 mg tid.         Renal: Creatinine 4.87. Renal consult was called, Dr. Cook will see. Agree bicarb infusion for a while.   Creatinine one year ago was 4.10.     CKD V appears chronic. Has outside nephrologist. Creatinine here today is better 4.65.       Stable on tele, PT eval.  No infection.   Change to PO bicarb.     If OK with Cardio, change to NOAC later today,     Rehab Monday,

## 2023-02-24 NOTE — PROGRESS NOTE ADULT - SUBJECTIVE AND OBJECTIVE BOX
[facility]   [phone]  RENAL FOLLOW UP NOTE  --------------------------------------------------------------------------------  Chief Complaint:    24 hour events/subjective:    seen earlier   more interactive today       PAST HISTORY  --------------------------------------------------------------------------------  No significant changes to PMH, PSH, FHx, SHx, unless otherwise noted    ALLERGIES & MEDICATIONS  --------------------------------------------------------------------------------  Allergies    No Known Allergies    Intolerances      Standing Inpatient Medications  acetaminophen     Tablet .. 975 milliGRAM(s) Oral every 8 hours  aMIOdarone    Tablet 200 milliGRAM(s) Oral two times a day  atorvastatin 40 milliGRAM(s) Oral at bedtime  brimonidine 0.2% Ophthalmic Solution 1 Drop(s) Both EYES two times a day  chlorhexidine 2% Cloths 1 Application(s) Topical daily  dextrose 5%. 1000 milliLiter(s) IV Continuous <Continuous>  dextrose 5%. 1000 milliLiter(s) IV Continuous <Continuous>  dextrose 50% Injectable 25 Gram(s) IV Push once  dextrose 50% Injectable 12.5 Gram(s) IV Push once  dextrose 50% Injectable 25 Gram(s) IV Push once  dorzolamide 2%/timolol 0.5% Ophthalmic Solution 1 Drop(s) Both EYES two times a day  glucagon  Injectable 1 milliGRAM(s) IntraMuscular once  heparin  Infusion. 1050 Unit(s)/Hr IV Continuous <Continuous>  hydrALAZINE 50 milliGRAM(s) Oral three times a day  insulin lispro (ADMELOG) corrective regimen sliding scale   SubCutaneous three times a day before meals  insulin lispro (ADMELOG) corrective regimen sliding scale   SubCutaneous at bedtime  isosorbide   mononitrate ER Tablet (IMDUR) 60 milliGRAM(s) Oral daily  metoprolol tartrate 12.5 milliGRAM(s) Oral two times a day  multivitamin 1 Tablet(s) Oral daily  pantoprazole    Tablet 40 milliGRAM(s) Oral before breakfast  polyethylene glycol 3350 17 Gram(s) Oral at bedtime  prednisoLONE acetate 1% Suspension 1 Drop(s) Left EYE three times a day  senna 2 Tablet(s) Oral at bedtime  sodium bicarbonate 1300 milliGRAM(s) Oral two times a day    PRN Inpatient Medications  dextrose Oral Gel 15 Gram(s) Oral once PRN  heparin   Injectable 4700 Unit(s) IV Push every 6 hours PRN  traMADol 25 milliGRAM(s) Oral every 6 hours PRN      REVIEW OF SYSTEMS  --------------------------------------------------------------------------------    Gen: denies chills,  CVS: denies chest pain/palpitations  Resp: denies SOB/Cough  GI: Denies N/V/Abd pain  : Denies dysuria    VITALS/PHYSICAL EXAM  --------------------------------------------------------------------------------  T(C): 36.4 (02-24-23 @ 19:50), Max: 36.4 (02-24-23 @ 08:09)  HR: 60 (02-24-23 @ 19:50) (60 - 76)  BP: 140/62 (02-24-23 @ 19:50) (140/62 - 181/84)  RR: 16 (02-24-23 @ 19:50) (16 - 18)  SpO2: 100% (02-24-23 @ 19:50) (97% - 100%)  Wt(kg): --        02-23-23 @ 07:01  -  02-24-23 @ 07:00  --------------------------------------------------------  IN: 1772 mL / OUT: 1375 mL / NET: 397 mL    02-24-23 @ 07:01  -  02-24-23 @ 21:52  --------------------------------------------------------  IN: 720 mL / OUT: 750 mL / NET: -30 mL      Physical Exam:  	  Gen:  responsive communicative  	Pulm: Decreased breath sounds b/l bases. no rales or ronchi or wheezing  	CV: RRR, S1/S2. no rub  	Abd: +BS, soft, nontender/nondistended  	: No bladder distention               Extremity: No cyanosis, no edema    LABS/STUDIES  --------------------------------------------------------------------------------              8.2    9.09  >-----------<  214      [02-24-23 @ 20:04]              27.3     139  |  108  |  77  ----------------------------<  210      [02-24-23 @ 04:45]  4.5   |  19  |  4.65        Ca     8.5     [02-24-23 @ 04:45]      Mg     2.4     [02-24-23 @ 04:45]      Phos  3.2     [02-24-23 @ 04:45]        PTT: 68.2       [02-24-23 @ 20:04]      Creatinine Trend:  SCr 4.65 [02-24 @ 04:45]  SCr 4.87 [02-23 @ 07:15]  SCr 5.16 [02-22 @ 06:16]  SCr 5.04 [02-21 @ 16:38]  SCr 4.47 [02-20 @ 05:39]              Urinalysis - [02-16-23 @ 22:15]      Color Yellow / Appearance Clear / SG 1.015 / pH 6.0      Gluc 1000 / Ketone Trace  / Bili Negative / Urobili Negative       Blood Small / Protein 500 / Leuk Est Negative / Nitrite Negative      RBC 0-2 / WBC Negative / Hyaline  / Gran  / Sq Epi  / Non Sq Epi Occasional / Bacteria Occasional      PTH -- (Ca 8.6)      [02-20-23 @ 05:39]   265  TSH 0.71      [02-21-23 @ 16:38]

## 2023-02-24 NOTE — PROGRESS NOTE ADULT - SUBJECTIVE AND OBJECTIVE BOX
Orthopaedic Surgery Progress Note    Subjective:   Patient seen and examined this AM. No acute orthopaedic events overnight. Pain controlled.      Physical Exam:    Gen: awake, alert, NAD  Resp: no increased work of breathing  RLE:  Aquacel dressing c/d/i   +EHL/FHL/TA/GS  SILT S/S/SP/DP  +DP Pulses  Compartments soft  No calf TTP                LABS:                                   9.1    10.70 )-----------( 186      ( 23 Feb 2023 07:13 )             29.9   02-24    139  |  108  |  77<H>  ----------------------------<  210<H>  4.5   |  19<L>  |  4.65<H>    Ca    8.5      24 Feb 2023 04:45  Phos  3.2     02-23  Mg     2.6     02-23    TPro  5.8<L>  /  Alb  2.8<L>  /  TBili  0.3  /  DBili  x   /  AST  25  /  ALT  <5<L>  /  AlkPhos  53  02-22      VITAL SIGNS:                        9.1    10.70 )-----------( 186      ( 23 Feb 2023 07:13 )             29.9   02-24    139  |  108  |  77<H>  ----------------------------<  210<H>  4.5   |  19<L>  |  4.65<H>    Ca    8.5      24 Feb 2023 04:45  Phos  3.2     02-23  Mg     2.6     02-23    TPro  5.8<L>  /  Alb  2.8<L>  /  TBili  0.3  /  DBili  x   /  AST  25  /  ALT  <5<L>  /  AlkPhos  53  02-22

## 2023-02-24 NOTE — PROGRESS NOTE ADULT - SUBJECTIVE AND OBJECTIVE BOX
CARDIOLOGY     PROGRESS  NOTE   ________________________________________________    CHIEF COMPLAINT:Patient is a 83y old  Male who presents with a chief complaint of hip fx (19 Feb 2023 14:17)  no complain  	  REVIEW OF SYSTEMS:  CONSTITUTIONAL: No fever, weight loss, or fatigue  EYES: No eye pain, visual disturbances, or discharge  ENT:  No difficulty hearing, tinnitus, vertigo; No sinus or throat pain  NECK: No pain or stiffness  RESPIRATORY: No cough, wheezing, chills or hemoptysis; No Shortness of Breath  CARDIOVASCULAR: No chest pain, palpitations, passing out, dizziness, or leg swelling  GASTROINTESTINAL: No abdominal or epigastric pain. No nausea, vomiting, or hematemesis; No diarrhea or constipation. No melena or hematochezia.  GENITOURINARY: No dysuria, frequency, hematuria, or incontinence  NEUROLOGICAL: No headaches, memory loss, loss of strength, numbness, or tremors  SKIN: No itching, burning, rashes, or lesions   LYMPH Nodes: No enlarged glands  ENDOCRINE: No heat or cold intolerance; No hair loss  MUSCULOSKELETAL: No joint pain or swelling; No muscle, back, or extremity pain  PSYCHIATRIC: No depression, anxiety, mood swings, or difficulty sleeping  HEME/LYMPH: No easy bruising, or bleeding gums  ALLERGY AND IMMUNOLOGIC: No hives or eczema	    [x ] All others negative	  [ ] Unable to obtain    PHYSICAL EXAM:  T(C): 36.4 (02-24-23 @ 08:09), Max: 36.6 (02-23-23 @ 16:39)  HR: 64 (02-24-23 @ 08:09) (63 - 74)  BP: 156/67 (02-24-23 @ 08:09) (156/67 - 181/84)  RR: 18 (02-24-23 @ 08:09) (18 - 18)  SpO2: 97% (02-24-23 @ 08:09) (95% - 99%)  Wt(kg): --  I&O's Summary    23 Feb 2023 07:01  -  24 Feb 2023 07:00  --------------------------------------------------------  IN: 1772 mL / OUT: 1375 mL / NET: 397 mL        Appearance: Normal	  HEENT:   Normal oral mucosa, PERRL, EOMI	  Lymphatic: No lymphadenopathy  Cardiovascular: Normal S1 S2, No JVD, + murmurs, No edema  Respiratory: rhonchi  Psychiatry: A & O x 3, Mood & affect appropriate  Gastrointestinal:  Soft, Non-tender, + BS	  Skin: No rashes, No ecchymoses, No cyanosis	  Neurologic: Non-focal  Extremities: Normal range of motion, No clubbing, cyanosis or edema  Vascular: Peripheral pulses palpable 2+ bilaterally    MEDICATIONS  (STANDING):  acetaminophen     Tablet .. 975 milliGRAM(s) Oral every 8 hours  aMIOdarone    Tablet 200 milliGRAM(s) Oral two times a day  atorvastatin 40 milliGRAM(s) Oral at bedtime  brimonidine 0.2% Ophthalmic Solution 1 Drop(s) Both EYES two times a day  chlorhexidine 2% Cloths 1 Application(s) Topical daily  dextrose 5%. 1000 milliLiter(s) (50 mL/Hr) IV Continuous <Continuous>  dextrose 5%. 1000 milliLiter(s) (100 mL/Hr) IV Continuous <Continuous>  dextrose 50% Injectable 25 Gram(s) IV Push once  dextrose 50% Injectable 12.5 Gram(s) IV Push once  dextrose 50% Injectable 25 Gram(s) IV Push once  dorzolamide 2%/timolol 0.5% Ophthalmic Solution 1 Drop(s) Both EYES two times a day  glucagon  Injectable 1 milliGRAM(s) IntraMuscular once  heparin  Infusion.  Unit(s)/Hr (9.5 mL/Hr) IV Continuous <Continuous>  hydrALAZINE 50 milliGRAM(s) Oral three times a day  insulin lispro (ADMELOG) corrective regimen sliding scale   SubCutaneous three times a day before meals  insulin lispro (ADMELOG) corrective regimen sliding scale   SubCutaneous at bedtime  isosorbide   mononitrate ER Tablet (IMDUR) 30 milliGRAM(s) Oral daily  metoprolol tartrate 12.5 milliGRAM(s) Oral two times a day  multivitamin 1 Tablet(s) Oral daily  pantoprazole    Tablet 40 milliGRAM(s) Oral before breakfast  polyethylene glycol 3350 17 Gram(s) Oral at bedtime  prednisoLONE acetate 1% Suspension 1 Drop(s) Left EYE three times a day  senna 2 Tablet(s) Oral at bedtime  sodium bicarbonate 1300 milliGRAM(s) Oral two times a day      TELEMETRY: 	    ECG:  	  RADIOLOGY:  OTHER: 	  	  LABS:	 	    CARDIAC MARKERS:                                9.1    10.70 )-----------( 186      ( 23 Feb 2023 07:13 )             29.9     02-24    139  |  108  |  77<H>  ----------------------------<  210<H>  4.5   |  19<L>  |  4.65<H>    Ca    8.5      24 Feb 2023 04:45  Phos  3.2     02-23  Mg     2.6     02-23      proBNP: Serum Pro-Brain Natriuretic Peptide: 6136 pg/mL (02-17 @ 02:45)    Lipid Profile:   HgA1c:   TSH: Thyroid Stimulating Hormone, Serum: 0.71 uIU/mL (02-21 @ 16:38)  Thyroid Stimulating Hormone, Serum: 1.55 uIU/mL (02-21 @ 07:17)    PTT - ( 24 Feb 2023 04:45 )  PTT:81.6 sec      Assessment and plan  ---------------------------  82M PMHx of DM presenting with right hip pain s/p mechanical fall today. Describes slipping in the kitchen and falling on his right side w/ right sided hip pain mild. Denies any LOC. Denies any antiplatelet or AC.   pt with hx of htn, ckd, no known hx of cad, who is very active with no cardiac complain ,s/p tripped and fall, pt was admitted to [facility] had mild elevation of trop with no sig jump , no cpk and MB was drawn. pt with no chest pain.  had a long discussion with son health care proxy the results of blood test, echo discussed with length with him the possibility of sig CAD and risk of MI and death  in addition to other medical co morbidity  renal disease explained to the health care proxy son  and son claims father  can not live with hip fx and not  being able to walk and understands the risk.  continue beta blocker and hydralazine , keep hgb at least >8  increase beta blocker to 25 mg po bid  PAF/ flutter in flutter last night  start on amio try to keep in nsr  AC if no contraindication from ortho  asa daily  lipitor  dc ivf  renal may need HD in near future  tele nsr in 60 to 70 first degree av block  decrease bp, decrease hydralazine dose  events has noted, bradycardia , pauses, metoprolol hads dcd , decrease amio   hr is better start on lopressor 12.5 mg bid  ac for PAF may switch to eliquis 2.5 mhg bid  physical therapy

## 2023-02-24 NOTE — PROGRESS NOTE ADULT - ASSESSMENT
81 yo male w/ pmhx of CKD, DM, HTN presented to the ED [facility] 2/2 due to fall. Right hip femoral fracture;  2/2 due to fall and NPO On 2/19/2023 for OR for hip surgery. noticed with abn creatinine 4.9 now at 4.4 pt and son state pt does not have ckd however, [place] [facility] [place]. was given a medication does not recall what med was given.      1- RASHAAD on ckd suspected  2- HTN   3- DM   4- hyperlipidemia   5- proteinuria  6- pneumonia  7- acidosis     as per Dr. Elliott d/w primary renal outpt team discussion cr last year of 4.   s/p hip surgery   hold losartan for now  monitor closely for uremia  cr seems to be improving slowly. he is also non oliguric therefore holding off on hd for now in light of improving creatinine   sodium bicarbonate 1300 mg po bid  abx per primary team  cont with lipitor   hydralazine to cont   cards follow up for arrhythmias   trend uo   tele monitor    strict I/O   trend lytes

## 2023-02-25 LAB
ANION GAP SERPL CALC-SCNC: 14 MMOL/L — SIGNIFICANT CHANGE UP (ref 5–17)
APTT BLD: 49.3 SEC — HIGH (ref 27.5–35.5)
APTT BLD: 68.2 SEC — HIGH (ref 27.5–35.5)
APTT BLD: 68.6 SEC — HIGH (ref 27.5–35.5)
BUN SERPL-MCNC: 75 MG/DL — HIGH (ref 7–23)
CALCIUM SERPL-MCNC: 8.8 MG/DL — SIGNIFICANT CHANGE UP (ref 8.4–10.5)
CHLORIDE SERPL-SCNC: 107 MMOL/L — SIGNIFICANT CHANGE UP (ref 96–108)
CO2 SERPL-SCNC: 18 MMOL/L — LOW (ref 22–31)
CREAT SERPL-MCNC: 4.68 MG/DL — HIGH (ref 0.5–1.3)
EGFR: 12 ML/MIN/1.73M2 — LOW
GLUCOSE BLDC GLUCOMTR-MCNC: 187 MG/DL — HIGH (ref 70–99)
GLUCOSE BLDC GLUCOMTR-MCNC: 193 MG/DL — HIGH (ref 70–99)
GLUCOSE BLDC GLUCOMTR-MCNC: 218 MG/DL — HIGH (ref 70–99)
GLUCOSE BLDC GLUCOMTR-MCNC: 241 MG/DL — HIGH (ref 70–99)
GLUCOSE SERPL-MCNC: 216 MG/DL — HIGH (ref 70–99)
HCT VFR BLD CALC: 30.6 % — LOW (ref 39–50)
HGB BLD-MCNC: 9 G/DL — LOW (ref 13–17)
MCHC RBC-ENTMCNC: 27.4 PG — SIGNIFICANT CHANGE UP (ref 27–34)
MCHC RBC-ENTMCNC: 29.4 GM/DL — LOW (ref 32–36)
MCV RBC AUTO: 93 FL — SIGNIFICANT CHANGE UP (ref 80–100)
NRBC # BLD: 0 /100 WBCS — SIGNIFICANT CHANGE UP (ref 0–0)
PLATELET # BLD AUTO: 232 K/UL — SIGNIFICANT CHANGE UP (ref 150–400)
POTASSIUM SERPL-MCNC: 4.8 MMOL/L — SIGNIFICANT CHANGE UP (ref 3.5–5.3)
POTASSIUM SERPL-SCNC: 4.8 MMOL/L — SIGNIFICANT CHANGE UP (ref 3.5–5.3)
RBC # BLD: 3.29 M/UL — LOW (ref 4.2–5.8)
RBC # FLD: 14.6 % — HIGH (ref 10.3–14.5)
SODIUM SERPL-SCNC: 139 MMOL/L — SIGNIFICANT CHANGE UP (ref 135–145)
WBC # BLD: 12.86 K/UL — HIGH (ref 3.8–10.5)
WBC # FLD AUTO: 12.86 K/UL — HIGH (ref 3.8–10.5)

## 2023-02-25 RX ORDER — AMIODARONE HYDROCHLORIDE 400 MG/1
200 TABLET ORAL DAILY
Refills: 0 | Status: DISCONTINUED | OUTPATIENT
Start: 2023-02-25 | End: 2023-03-01

## 2023-02-25 RX ORDER — HALOPERIDOL DECANOATE 100 MG/ML
0.5 INJECTION INTRAMUSCULAR ONCE
Refills: 0 | Status: COMPLETED | OUTPATIENT
Start: 2023-02-25 | End: 2023-02-26

## 2023-02-25 RX ORDER — HALOPERIDOL DECANOATE 100 MG/ML
0.5 INJECTION INTRAMUSCULAR ONCE
Refills: 0 | Status: COMPLETED | OUTPATIENT
Start: 2023-02-25 | End: 2023-02-25

## 2023-02-25 RX ORDER — HALOPERIDOL DECANOATE 100 MG/ML
0.5 INJECTION INTRAMUSCULAR ONCE
Refills: 0 | Status: DISCONTINUED | OUTPATIENT
Start: 2023-02-25 | End: 2023-02-25

## 2023-02-25 RX ORDER — AMLODIPINE BESYLATE 2.5 MG/1
2.5 TABLET ORAL DAILY
Refills: 0 | Status: DISCONTINUED | OUTPATIENT
Start: 2023-02-25 | End: 2023-02-28

## 2023-02-25 RX ADMIN — Medication 2: at 18:06

## 2023-02-25 RX ADMIN — AMIODARONE HYDROCHLORIDE 200 MILLIGRAM(S): 400 TABLET ORAL at 05:19

## 2023-02-25 RX ADMIN — Medication 50 MILLIGRAM(S): at 22:14

## 2023-02-25 RX ADMIN — HALOPERIDOL DECANOATE 0.5 MILLIGRAM(S): 100 INJECTION INTRAMUSCULAR at 04:40

## 2023-02-25 RX ADMIN — Medication 975 MILLIGRAM(S): at 12:20

## 2023-02-25 RX ADMIN — Medication 1 TABLET(S): at 12:20

## 2023-02-25 RX ADMIN — DORZOLAMIDE HYDROCHLORIDE TIMOLOL MALEATE 1 DROP(S): 20; 5 SOLUTION/ DROPS OPHTHALMIC at 06:08

## 2023-02-25 RX ADMIN — DORZOLAMIDE HYDROCHLORIDE TIMOLOL MALEATE 1 DROP(S): 20; 5 SOLUTION/ DROPS OPHTHALMIC at 20:04

## 2023-02-25 RX ADMIN — Medication 12.5 MILLIGRAM(S): at 05:17

## 2023-02-25 RX ADMIN — BRIMONIDINE TARTRATE 1 DROP(S): 2 SOLUTION/ DROPS OPHTHALMIC at 05:20

## 2023-02-25 RX ADMIN — HEPARIN SODIUM 1200 UNIT(S)/HR: 5000 INJECTION INTRAVENOUS; SUBCUTANEOUS at 16:36

## 2023-02-25 RX ADMIN — PANTOPRAZOLE SODIUM 40 MILLIGRAM(S): 20 TABLET, DELAYED RELEASE ORAL at 05:18

## 2023-02-25 RX ADMIN — Medication 1 DROP(S): at 13:29

## 2023-02-25 RX ADMIN — Medication 1300 MILLIGRAM(S): at 05:18

## 2023-02-25 RX ADMIN — SENNA PLUS 2 TABLET(S): 8.6 TABLET ORAL at 22:00

## 2023-02-25 RX ADMIN — ATORVASTATIN CALCIUM 40 MILLIGRAM(S): 80 TABLET, FILM COATED ORAL at 22:13

## 2023-02-25 RX ADMIN — Medication 50 MILLIGRAM(S): at 05:18

## 2023-02-25 RX ADMIN — BRIMONIDINE TARTRATE 1 DROP(S): 2 SOLUTION/ DROPS OPHTHALMIC at 18:24

## 2023-02-25 RX ADMIN — Medication 1 DROP(S): at 05:20

## 2023-02-25 RX ADMIN — HEPARIN SODIUM 1200 UNIT(S)/HR: 5000 INJECTION INTRAVENOUS; SUBCUTANEOUS at 08:07

## 2023-02-25 RX ADMIN — Medication 1 DROP(S): at 21:59

## 2023-02-25 RX ADMIN — Medication 50 MILLIGRAM(S): at 12:19

## 2023-02-25 RX ADMIN — Medication 975 MILLIGRAM(S): at 05:21

## 2023-02-25 RX ADMIN — HALOPERIDOL DECANOATE 0.5 MILLIGRAM(S): 100 INJECTION INTRAMUSCULAR at 06:03

## 2023-02-25 RX ADMIN — Medication 975 MILLIGRAM(S): at 21:59

## 2023-02-25 RX ADMIN — ISOSORBIDE MONONITRATE 60 MILLIGRAM(S): 60 TABLET, EXTENDED RELEASE ORAL at 12:22

## 2023-02-25 RX ADMIN — Medication 1: at 12:22

## 2023-02-25 RX ADMIN — CHLORHEXIDINE GLUCONATE 1 APPLICATION(S): 213 SOLUTION TOPICAL at 11:14

## 2023-02-25 RX ADMIN — Medication 1300 MILLIGRAM(S): at 18:22

## 2023-02-25 RX ADMIN — POLYETHYLENE GLYCOL 3350 17 GRAM(S): 17 POWDER, FOR SOLUTION ORAL at 22:14

## 2023-02-25 RX ADMIN — Medication 2: at 09:58

## 2023-02-25 NOTE — PROGRESS NOTE ADULT - ASSESSMENT
81 yo male w/ pmhx of CKD, DM, HTN presented to the ED [facility] 2/2 due to fall.        Plan: Right hip femoral fracture due to fall. Had right hip surgery. Stable in bed.  CT chest no pneumonia.  Stop all ABX.       COVID- 19 :  No evidence of Hypoxia, no treatment needed.     Elevated Troponin; continue to trend, unable to assess if any chest pain, patient is currently confused. TTE decreased EF 40 %. EKG notes inferior and septal MI. CHF, euvolemic.      DM: stable, hold all meds. SSC.  Consider low dose Lantus.  Acceptable today around 195 off Lantus.      Essential HTN: resume home medication lopressor, lipitor and Imdur.  Hydralazine resumed 50 mg tid.         Renal: Creatinine 4.87. Renal consult was called, Dr. Cook will see. Agree bicarb infusion for a while.   Creatinine one year ago was 4.10.     CKD V appears chronic. Has outside nephrologist. Creatinine here today is better 4.65.       Stable on tele, PT eval.  No infection.   Change to PO bicarb.     If OK with Cardio, change to NOAC later today,     Rehab Monday,       83 yo male w/ pmhx of CKD, DM, HTN presented to the ED [facility] 2/2 due to fall.        Plan: Right hip femoral fracture due to fall. Had right hip surgery. Stable in bed.  CT chest no pneumonia.  Stop all ABX.       COVID- 19 :  No evidence of Hypoxia, no treatment needed.     Elevated Troponin; continue to trend, unable to assess if any chest pain, patient is currently confused. TTE decreased EF 40 %. EKG notes inferior and septal MI. CHF, euvolemic.      DM: stable, hold all meds. SSC.   Acceptable today around 195 off Lantus.      Essential HTN: resume home medication lopressor, lipitor and Imdur.  Hydralazine on hold.          Renal: Creatinine 4.87. Renal consult was called, Dr. Cook will see. Agree bicarb infusion for a while.   Creatinine one year ago was 4.10.     CKD V appears chronic. Has outside nephrologist. Creatinine here today is better 4.65.       Stable on tele, some bradycardia.  PT eval.  Changed to PO bicarb.     If OK with Cardio, change to NOAC after weekend and discharge to rehab.      Family members states patient does not want a PPM.

## 2023-02-25 NOTE — PROGRESS NOTE ADULT - SUBJECTIVE AND OBJECTIVE BOX
CARDIOLOGY     PROGRESS  NOTE   ________________________________________________    CHIEF COMPLAINT:Patient is a 83y old  Male who presents with a chief complaint of hip fx (19 Feb 2023 14:17)  no complain  	  REVIEW OF SYSTEMS:  CONSTITUTIONAL: No fever, weight loss, or fatigue  EYES: No eye pain, visual disturbances, or discharge  ENT:  No difficulty hearing, tinnitus, vertigo; No sinus or throat pain  NECK: No pain or stiffness  RESPIRATORY: No cough, wheezing, chills or hemoptysis; No Shortness of Breath  CARDIOVASCULAR: No chest pain, palpitations, passing out, dizziness, or leg swelling  GASTROINTESTINAL: No abdominal or epigastric pain. No nausea, vomiting, or hematemesis; No diarrhea or constipation. No melena or hematochezia.  GENITOURINARY: No dysuria, frequency, hematuria, or incontinence  NEUROLOGICAL: No headaches, memory loss, loss of strength, numbness, or tremors  SKIN: No itching, burning, rashes, or lesions   LYMPH Nodes: No enlarged glands  ENDOCRINE: No heat or cold intolerance; No hair loss  MUSCULOSKELETAL: No joint pain or swelling; No muscle, back, or extremity pain  PSYCHIATRIC: No depression, anxiety, mood swings, or difficulty sleeping  HEME/LYMPH: No easy bruising, or bleeding gums  ALLERGY AND IMMUNOLOGIC: No hives or eczema	    [ x] All others negative	  [ ] Unable to obtain    PHYSICAL EXAM:  T(C): 36.3 (02-25-23 @ 05:02), Max: 36.4 (02-24-23 @ 19:50)  HR: 67 (02-25-23 @ 05:02) (58 - 76)  BP: 168/77 (02-25-23 @ 05:02) (140/62 - 169/80)  RR: 18 (02-25-23 @ 05:02) (16 - 18)  SpO2: 98% (02-25-23 @ 05:02) (98% - 100%)  Wt(kg): --  I&O's Summary    24 Feb 2023 07:01  -  25 Feb 2023 07:00  --------------------------------------------------------  IN: 720 mL / OUT: 1650 mL / NET: -930 mL        Appearance: Normal	  HEENT:   Normal oral mucosa, PERRL, EOMI	  Lymphatic: No lymphadenopathy  Cardiovascular: Normal S1 S2, No JVD, + murmurs, No edema  Respiratory: rhonchi  Psychiatry: A & O x 3, Mood & affect appropriate  Gastrointestinal:  Soft, Non-tender, + BS	  Skin: No rashes, No ecchymoses, No cyanosis	  Neurologic: Non-focal  Extremities: Normal range of motion, No clubbing, cyanosis or edema  Vascular: Peripheral pulses palpable 2+ bilaterally    MEDICATIONS  (STANDING):  acetaminophen     Tablet .. 975 milliGRAM(s) Oral every 8 hours  aMIOdarone    Tablet 200 milliGRAM(s) Oral two times a day  atorvastatin 40 milliGRAM(s) Oral at bedtime  brimonidine 0.2% Ophthalmic Solution 1 Drop(s) Both EYES two times a day  chlorhexidine 2% Cloths 1 Application(s) Topical daily  dextrose 5%. 1000 milliLiter(s) (100 mL/Hr) IV Continuous <Continuous>  dextrose 5%. 1000 milliLiter(s) (50 mL/Hr) IV Continuous <Continuous>  dextrose 50% Injectable 25 Gram(s) IV Push once  dextrose 50% Injectable 12.5 Gram(s) IV Push once  dextrose 50% Injectable 25 Gram(s) IV Push once  dorzolamide 2%/timolol 0.5% Ophthalmic Solution 1 Drop(s) Both EYES two times a day  glucagon  Injectable 1 milliGRAM(s) IntraMuscular once  heparin  Infusion. 1050 Unit(s)/Hr (10.5 mL/Hr) IV Continuous <Continuous>  hydrALAZINE 50 milliGRAM(s) Oral three times a day  insulin lispro (ADMELOG) corrective regimen sliding scale   SubCutaneous three times a day before meals  insulin lispro (ADMELOG) corrective regimen sliding scale   SubCutaneous at bedtime  isosorbide   mononitrate ER Tablet (IMDUR) 60 milliGRAM(s) Oral daily  metoprolol tartrate 12.5 milliGRAM(s) Oral two times a day  multivitamin 1 Tablet(s) Oral daily  pantoprazole    Tablet 40 milliGRAM(s) Oral before breakfast  polyethylene glycol 3350 17 Gram(s) Oral at bedtime  prednisoLONE acetate 1% Suspension 1 Drop(s) Left EYE three times a day  senna 2 Tablet(s) Oral at bedtime  sodium bicarbonate 1300 milliGRAM(s) Oral two times a day      TELEMETRY: 	    ECG:  	  RADIOLOGY:  OTHER: 	  	  LABS:	 	    CARDIAC MARKERS:                                9.0    12.86 )-----------( 232      ( 25 Feb 2023 07:14 )             30.6     02-25    139  |  107  |  75<H>  ----------------------------<  216<H>  4.8   |  18<L>  |  4.68<H>    Ca    8.8      25 Feb 2023 07:14  Phos  3.2     02-24  Mg     2.4     02-24      proBNP: Serum Pro-Brain Natriuretic Peptide: 6136 pg/mL (02-17 @ 02:45)    Lipid Profile:   HgA1c:   TSH: Thyroid Stimulating Hormone, Serum: 0.71 uIU/mL (02-21 @ 16:38)  Thyroid Stimulating Hormone, Serum: 1.55 uIU/mL (02-21 @ 07:17)    PTT - ( 25 Feb 2023 07:14 )  PTT:49.3 sec      Assessment and plan  ---------------------------  82M PMHx of DM presenting with right hip pain s/p mechanical fall today. Describes slipping in the kitchen and falling on his right side w/ right sided hip pain mild. Denies any LOC. Denies any antiplatelet or AC.   pt with hx of htn, ckd, no known hx of cad, who is very active with no cardiac complain ,s/p tripped and fall, pt was admitted to [facility] had mild elevation of trop with no sig jump , no cpk and MB was drawn. pt with no chest pain.  had a long discussion with son health care proxy the results of blood test, echo discussed with length with him the possibility of sig CAD and risk of MI and death  in addition to other medical co morbidity  renal disease explained to the health care proxy son  and son claims father  can not live with hip fx and not  being able to walk and understands the risk.  continue beta blocker and hydralazine , keep hgb at least >8  increase beta blocker to 25 mg po bid  PAF/ flutter in flutter last night  start on amio try to keep in nsr  AC if no contraindication from ortho  asa daily  lipitor  dc ivf  renal may need HD in near future  tele nsr in 60 to 70 first degree av block  decrease bp, decrease hydralazine dose, will adjust bp meds  hr is better start on lopressor 12.5 mg bid , change amio to 200 mg daily  discussed with pt and son possibilty of ppm  physical therapy  pt needs to fu closely reg chf/ cardiomyopathy

## 2023-02-25 NOTE — PROGRESS NOTE ADULT - SUBJECTIVE AND OBJECTIVE BOX
[facility]   [phone]  RENAL FOLLOW UP NOTE  --------------------------------------------------------------------------------  Chief Complaint:    24 hour events/subjective:    seen earlier   states feels "ok"     PAST HISTORY  --------------------------------------------------------------------------------  No significant changes to PMH, PSH, FHx, SHx, unless otherwise noted    ALLERGIES & MEDICATIONS  --------------------------------------------------------------------------------  Allergies    No Known Allergies    Intolerances      Standing Inpatient Medications  acetaminophen     Tablet .. 975 milliGRAM(s) Oral every 8 hours  aMIOdarone    Tablet 200 milliGRAM(s) Oral daily  amLODIPine   Tablet 2.5 milliGRAM(s) Oral daily  atorvastatin 40 milliGRAM(s) Oral at bedtime  brimonidine 0.2% Ophthalmic Solution 1 Drop(s) Both EYES two times a day  chlorhexidine 2% Cloths 1 Application(s) Topical daily  dextrose 5%. 1000 milliLiter(s) IV Continuous <Continuous>  dextrose 5%. 1000 milliLiter(s) IV Continuous <Continuous>  dextrose 50% Injectable 25 Gram(s) IV Push once  dextrose 50% Injectable 12.5 Gram(s) IV Push once  dextrose 50% Injectable 25 Gram(s) IV Push once  dorzolamide 2%/timolol 0.5% Ophthalmic Solution 1 Drop(s) Both EYES two times a day  glucagon  Injectable 1 milliGRAM(s) IntraMuscular once  heparin  Infusion. 1050 Unit(s)/Hr IV Continuous <Continuous>  hydrALAZINE 50 milliGRAM(s) Oral three times a day  insulin lispro (ADMELOG) corrective regimen sliding scale   SubCutaneous three times a day before meals  insulin lispro (ADMELOG) corrective regimen sliding scale   SubCutaneous at bedtime  isosorbide   mononitrate ER Tablet (IMDUR) 60 milliGRAM(s) Oral daily  multivitamin 1 Tablet(s) Oral daily  pantoprazole    Tablet 40 milliGRAM(s) Oral before breakfast  polyethylene glycol 3350 17 Gram(s) Oral at bedtime  prednisoLONE acetate 1% Suspension 1 Drop(s) Left EYE three times a day  senna 2 Tablet(s) Oral at bedtime  sodium bicarbonate 1300 milliGRAM(s) Oral two times a day    PRN Inpatient Medications  dextrose Oral Gel 15 Gram(s) Oral once PRN  heparin   Injectable 4700 Unit(s) IV Push every 6 hours PRN  traMADol 25 milliGRAM(s) Oral every 6 hours PRN      REVIEW OF SYSTEMS  --------------------------------------------------------------------------------    Gen: denies  chills   CVS: denies chest pain/palpitations  Resp: denies SOB/Cough  GI: Denies N/V/Abd pain  : Denies dysuria      VITALS/PHYSICAL EXAM  --------------------------------------------------------------------------------  T(C): 36.6 (02-25-23 @ 10:55), Max: 36.6 (02-25-23 @ 10:55)  HR: 62 (02-25-23 @ 10:55) (58 - 67)  BP: 165/69 (02-25-23 @ 10:55) (140/62 - 169/80)  RR: 18 (02-25-23 @ 10:55) (16 - 18)  SpO2: 97% (02-25-23 @ 10:55) (97% - 100%)  Wt(kg): --        02-24-23 @ 07:01  -  02-25-23 @ 07:00  --------------------------------------------------------  IN: 720 mL / OUT: 1650 mL / NET: -930 mL      Physical Exam:  	    	  Gen:  responsive communicative  	Pulm: Decreased breath sounds b/l bases. no rales or ronchi or wheezing  	CV: RRR, S1/S2. no rub  	Abd: +BS, soft, nontender/nondistended  	: No bladder distention               Extremity: No cyanosis, no edema      LABS/STUDIES  --------------------------------------------------------------------------------              9.0    12.86 >-----------<  232      [02-25-23 @ 07:14]              30.6     139  |  107  |  75  ----------------------------<  216      [02-25-23 @ 07:14]  4.8   |  18  |  4.68        Ca     8.8     [02-25-23 @ 07:14]      Mg     2.4     [02-24-23 @ 04:45]      Phos  3.2     [02-24-23 @ 04:45]        PTT: 49.3       [02-25-23 @ 07:14]      Creatinine Trend:  SCr 4.68 [02-25 @ 07:14]  SCr 4.65 [02-24 @ 04:45]  SCr 4.87 [02-23 @ 07:15]  SCr 5.16 [02-22 @ 06:16]  SCr 5.04 [02-21 @ 16:38]              Urinalysis - [02-16-23 @ 22:15]      Color Yellow / Appearance Clear / SG 1.015 / pH 6.0      Gluc 1000 / Ketone Trace  / Bili Negative / Urobili Negative       Blood Small / Protein 500 / Leuk Est Negative / Nitrite Negative      RBC 0-2 / WBC Negative / Hyaline  / Gran  / Sq Epi  / Non Sq Epi Occasional / Bacteria Occasional      PTH -- (Ca 8.6)      [02-20-23 @ 05:39]   265  TSH 0.71      [02-21-23 @ 16:38]

## 2023-02-25 NOTE — PROGRESS NOTE ADULT - ASSESSMENT
83 yo male w/ pmhx of CKD, DM, HTN presented to the ED BIBEMS 2/2 due to fall. Right hip femoral fracture;  2/2 due to fall and NPO On 2/19/2023 for OR for hip surgery. noticed with abn creatinine 4.9 now at 4.4 pt and son state pt does not have ckd however, saw a nephrologist in [facility] [place] last year once only. was given a medication does not recall what med was given.      1- RASHAAD on ckd suspected  2- HTN   3- DM   4- hyperlipidemia   5- proteinuria  6- pneumonia  7- acidosis     as per Dr. Elliott d/w primary renal outpt team discussion cr last year of 4.   s/p hip surgery   hold losartan for now  cr steady and pt MS slowly improving as well    sodium bicarbonate 1300 mg po bid  abx per primary team  cont with lipitor   hydralazine to cont   cards follow up for arrhythmias   trend uo   PT  tele monitor    strict I/O

## 2023-02-25 NOTE — PROGRESS NOTE ADULT - SUBJECTIVE AND OBJECTIVE BOX
INTERVAL HPI/OVERNIGHT EVENTS:  Pt seen and examined at bedside.     Allergies/Intolerance: No Known Allergies      MEDICATIONS  (STANDING):  acetaminophen     Tablet .. 975 milliGRAM(s) Oral every 8 hours  aMIOdarone    Tablet 200 milliGRAM(s) Oral two times a day  atorvastatin 40 milliGRAM(s) Oral at bedtime  brimonidine 0.2% Ophthalmic Solution 1 Drop(s) Both EYES two times a day  chlorhexidine 2% Cloths 1 Application(s) Topical daily  dextrose 5%. 1000 milliLiter(s) (50 mL/Hr) IV Continuous <Continuous>  dextrose 5%. 1000 milliLiter(s) (100 mL/Hr) IV Continuous <Continuous>  dextrose 50% Injectable 25 Gram(s) IV Push once  dextrose 50% Injectable 12.5 Gram(s) IV Push once  dextrose 50% Injectable 25 Gram(s) IV Push once  dorzolamide 2%/timolol 0.5% Ophthalmic Solution 1 Drop(s) Both EYES two times a day  glucagon  Injectable 1 milliGRAM(s) IntraMuscular once  heparin  Infusion. 1050 Unit(s)/Hr (10.5 mL/Hr) IV Continuous <Continuous>  hydrALAZINE 50 milliGRAM(s) Oral three times a day  insulin lispro (ADMELOG) corrective regimen sliding scale   SubCutaneous three times a day before meals  insulin lispro (ADMELOG) corrective regimen sliding scale   SubCutaneous at bedtime  isosorbide   mononitrate ER Tablet (IMDUR) 60 milliGRAM(s) Oral daily  metoprolol tartrate 12.5 milliGRAM(s) Oral two times a day  multivitamin 1 Tablet(s) Oral daily  pantoprazole    Tablet 40 milliGRAM(s) Oral before breakfast  polyethylene glycol 3350 17 Gram(s) Oral at bedtime  prednisoLONE acetate 1% Suspension 1 Drop(s) Left EYE three times a day  senna 2 Tablet(s) Oral at bedtime  sodium bicarbonate 1300 milliGRAM(s) Oral two times a day    MEDICATIONS  (PRN):  dextrose Oral Gel 15 Gram(s) Oral once PRN Blood Glucose LESS THAN 70 milliGRAM(s)/deciliter  heparin   Injectable 4700 Unit(s) IV Push every 6 hours PRN For aPTT less than 40  traMADol 25 milliGRAM(s) Oral every 6 hours PRN Mild Pain (1 - 3)        ROS: all systems reviewed and wnl      PHYSICAL EXAMINATION:  Vital Signs Last 24 Hrs  T(C): 36.3 (25 Feb 2023 05:02), Max: 36.4 (24 Feb 2023 19:50)  T(F): 97.4 (25 Feb 2023 05:02), Max: 97.6 (25 Feb 2023 01:00)  HR: 67 (25 Feb 2023 05:02) (58 - 76)  BP: 168/77 (25 Feb 2023 05:02) (140/62 - 173/77)  BP(mean): --  RR: 18 (25 Feb 2023 05:02) (16 - 18)  SpO2: 98% (25 Feb 2023 05:02) (98% - 100%)    Parameters below as of 25 Feb 2023 05:02  Patient On (Oxygen Delivery Method): room air      CAPILLARY BLOOD GLUCOSE      POCT Blood Glucose.: 178 mg/dL (24 Feb 2023 21:45)  POCT Blood Glucose.: 234 mg/dL (24 Feb 2023 17:02)  POCT Blood Glucose.: 211 mg/dL (24 Feb 2023 11:46)  POCT Blood Glucose.: 194 mg/dL (24 Feb 2023 09:19)      02-24 @ 07:01  -  02-25 @ 07:00  --------------------------------------------------------  IN: 720 mL / OUT: 1400 mL / NET: -680 mL        GENERAL: stable, in bed, on RA, comfortable.   NECK: supple, No JVD  CHEST/LUNG: clear to auscultation bilaterally; no rales, rhonchi, or wheezing b/l  HEART: normal S1, S2  ABDOMEN: BS+, soft, ND, NT   EXTREMITIES:  pulses palpable; no clubbing, cyanosis, or edema b/l LEs      LABS:                        9.0    12.86 )-----------( 232      ( 25 Feb 2023 07:14 )             30.6     02-25    139  |  107  |  75<H>  ----------------------------<  216<H>  4.8   |  18<L>  |  4.68<H>    Ca    8.8      25 Feb 2023 07:14  Phos  3.2     02-24  Mg     2.4     02-24      PTT - ( 25 Feb 2023 07:14 )  PTT:49.3 sec           INTERVAL HPI/OVERNIGHT EVENTS:  Pt seen and examined at bedside.     Allergies/Intolerance: No Known Allergies      MEDICATIONS  (STANDING):  acetaminophen     Tablet .. 975 milliGRAM(s) Oral every 8 hours  aMIOdarone    Tablet 200 milliGRAM(s) Oral two times a day  atorvastatin 40 milliGRAM(s) Oral at bedtime  brimonidine 0.2% Ophthalmic Solution 1 Drop(s) Both EYES two times a day  chlorhexidine 2% Cloths 1 Application(s) Topical daily  dextrose 5%. 1000 milliLiter(s) (50 mL/Hr) IV Continuous <Continuous>  dextrose 5%. 1000 milliLiter(s) (100 mL/Hr) IV Continuous <Continuous>  dextrose 50% Injectable 25 Gram(s) IV Push once  dextrose 50% Injectable 12.5 Gram(s) IV Push once  dextrose 50% Injectable 25 Gram(s) IV Push once  dorzolamide 2%/timolol 0.5% Ophthalmic Solution 1 Drop(s) Both EYES two times a day  glucagon  Injectable 1 milliGRAM(s) IntraMuscular once  heparin  Infusion. 1050 Unit(s)/Hr (10.5 mL/Hr) IV Continuous <Continuous>  hydrALAZINE 50 milliGRAM(s) Oral three times a day  insulin lispro (ADMELOG) corrective regimen sliding scale   SubCutaneous three times a day before meals  insulin lispro (ADMELOG) corrective regimen sliding scale   SubCutaneous at bedtime  isosorbide   mononitrate ER Tablet (IMDUR) 60 milliGRAM(s) Oral daily  metoprolol tartrate 12.5 milliGRAM(s) Oral two times a day  multivitamin 1 Tablet(s) Oral daily  pantoprazole    Tablet 40 milliGRAM(s) Oral before breakfast  polyethylene glycol 3350 17 Gram(s) Oral at bedtime  prednisoLONE acetate 1% Suspension 1 Drop(s) Left EYE three times a day  senna 2 Tablet(s) Oral at bedtime  sodium bicarbonate 1300 milliGRAM(s) Oral two times a day    MEDICATIONS  (PRN):  dextrose Oral Gel 15 Gram(s) Oral once PRN Blood Glucose LESS THAN 70 milliGRAM(s)/deciliter  heparin   Injectable 4700 Unit(s) IV Push every 6 hours PRN For aPTT less than 40  traMADol 25 milliGRAM(s) Oral every 6 hours PRN Mild Pain (1 - 3)        ROS: all systems reviewed and wnl      PHYSICAL EXAMINATION:  Vital Signs Last 24 Hrs  T(C): 36.3 (25 Feb 2023 05:02), Max: 36.4 (24 Feb 2023 19:50)  T(F): 97.4 (25 Feb 2023 05:02), Max: 97.6 (25 Feb 2023 01:00)  HR: 67 (25 Feb 2023 05:02) (58 - 76)  BP: 168/77 (25 Feb 2023 05:02) (140/62 - 173/77)  BP(mean): --  RR: 18 (25 Feb 2023 05:02) (16 - 18)  SpO2: 98% (25 Feb 2023 05:02) (98% - 100%)    Parameters below as of 25 Feb 2023 05:02  Patient On (Oxygen Delivery Method): room air      CAPILLARY BLOOD GLUCOSE      POCT Blood Glucose.: 178 mg/dL (24 Feb 2023 21:45)  POCT Blood Glucose.: 234 mg/dL (24 Feb 2023 17:02)  POCT Blood Glucose.: 211 mg/dL (24 Feb 2023 11:46)  POCT Blood Glucose.: 194 mg/dL (24 Feb 2023 09:19)      02-24 @ 07:01  -  02-25 @ 07:00  --------------------------------------------------------  IN: 720 mL / OUT: 1400 mL / NET: -680 mL        GENERAL: stable, in chair, on RA, comfortable, son at bedside.     NECK: supple, No JVD  CHEST/LUNG: clear to auscultation bilaterally; no rales, rhonchi, or wheezing b/l  HEART: normal S1, S2  ABDOMEN: BS+, soft, ND, NT   EXTREMITIES:  pulses palpable; no clubbing, cyanosis, or edema b/l LEs      LABS:                        9.0    12.86 )-----------( 232      ( 25 Feb 2023 07:14 )             30.6     02-25    139  |  107  |  75<H>  ----------------------------<  216<H>  4.8   |  18<L>  |  4.68<H>    Ca    8.8      25 Feb 2023 07:14  Phos  3.2     02-24  Mg     2.4     02-24      PTT - ( 25 Feb 2023 07:14 )  PTT:49.3 sec

## 2023-02-26 LAB
ANION GAP SERPL CALC-SCNC: 10 MMOL/L — SIGNIFICANT CHANGE UP (ref 5–17)
ANION GAP SERPL CALC-SCNC: 26 MMOL/L — HIGH (ref 5–17)
APTT BLD: 50.8 SEC — HIGH (ref 27.5–35.5)
APTT BLD: 66.3 SEC — HIGH (ref 27.5–35.5)
APTT BLD: 82.2 SEC — HIGH (ref 27.5–35.5)
BUN SERPL-MCNC: 66 MG/DL — HIGH (ref 7–23)
BUN SERPL-MCNC: 66 MG/DL — HIGH (ref 7–23)
CALCIUM SERPL-MCNC: 8.7 MG/DL — SIGNIFICANT CHANGE UP (ref 8.4–10.5)
CALCIUM SERPL-MCNC: <3 MG/DL — CRITICAL LOW (ref 8.4–10.5)
CHLORIDE SERPL-SCNC: 108 MMOL/L — SIGNIFICANT CHANGE UP (ref 96–108)
CHLORIDE SERPL-SCNC: 97 MMOL/L — SIGNIFICANT CHANGE UP (ref 96–108)
CO2 SERPL-SCNC: 15 MMOL/L — LOW (ref 22–31)
CO2 SERPL-SCNC: 22 MMOL/L — SIGNIFICANT CHANGE UP (ref 22–31)
CREAT SERPL-MCNC: 4.59 MG/DL — HIGH (ref 0.5–1.3)
CREAT SERPL-MCNC: 4.6 MG/DL — HIGH (ref 0.5–1.3)
EGFR: 12 ML/MIN/1.73M2 — LOW
EGFR: 12 ML/MIN/1.73M2 — LOW
GLUCOSE BLDC GLUCOMTR-MCNC: 210 MG/DL — HIGH (ref 70–99)
GLUCOSE BLDC GLUCOMTR-MCNC: 217 MG/DL — HIGH (ref 70–99)
GLUCOSE BLDC GLUCOMTR-MCNC: 224 MG/DL — HIGH (ref 70–99)
GLUCOSE BLDC GLUCOMTR-MCNC: 241 MG/DL — HIGH (ref 70–99)
GLUCOSE SERPL-MCNC: 212 MG/DL — HIGH (ref 70–99)
GLUCOSE SERPL-MCNC: 214 MG/DL — HIGH (ref 70–99)
HCT VFR BLD CALC: 27.9 % — LOW (ref 39–50)
HCT VFR BLD CALC: 28 % — LOW (ref 39–50)
HGB BLD-MCNC: 8.2 G/DL — LOW (ref 13–17)
HGB BLD-MCNC: 8.6 G/DL — LOW (ref 13–17)
MCHC RBC-ENTMCNC: 27.8 PG — SIGNIFICANT CHANGE UP (ref 27–34)
MCHC RBC-ENTMCNC: 27.9 PG — SIGNIFICANT CHANGE UP (ref 27–34)
MCHC RBC-ENTMCNC: 29.3 GM/DL — LOW (ref 32–36)
MCHC RBC-ENTMCNC: 30.8 GM/DL — LOW (ref 32–36)
MCV RBC AUTO: 90.6 FL — SIGNIFICANT CHANGE UP (ref 80–100)
MCV RBC AUTO: 94.9 FL — SIGNIFICANT CHANGE UP (ref 80–100)
NRBC # BLD: 0 /100 WBCS — SIGNIFICANT CHANGE UP (ref 0–0)
NRBC # BLD: 0 /100 WBCS — SIGNIFICANT CHANGE UP (ref 0–0)
PLATELET # BLD AUTO: 224 K/UL — SIGNIFICANT CHANGE UP (ref 150–400)
PLATELET # BLD AUTO: 237 K/UL — SIGNIFICANT CHANGE UP (ref 150–400)
POTASSIUM SERPL-MCNC: 4.6 MMOL/L — SIGNIFICANT CHANGE UP (ref 3.5–5.3)
POTASSIUM SERPL-MCNC: >9 MMOL/L — CRITICAL HIGH (ref 3.5–5.3)
POTASSIUM SERPL-SCNC: 4.6 MMOL/L — SIGNIFICANT CHANGE UP (ref 3.5–5.3)
POTASSIUM SERPL-SCNC: >9 MMOL/L — CRITICAL HIGH (ref 3.5–5.3)
RBC # BLD: 2.95 M/UL — LOW (ref 4.2–5.8)
RBC # BLD: 3.08 M/UL — LOW (ref 4.2–5.8)
RBC # FLD: 14.6 % — HIGH (ref 10.3–14.5)
RBC # FLD: 14.6 % — HIGH (ref 10.3–14.5)
SODIUM SERPL-SCNC: 138 MMOL/L — SIGNIFICANT CHANGE UP (ref 135–145)
SODIUM SERPL-SCNC: 140 MMOL/L — SIGNIFICANT CHANGE UP (ref 135–145)
WBC # BLD: 9.5 K/UL — SIGNIFICANT CHANGE UP (ref 3.8–10.5)
WBC # BLD: 9.99 K/UL — SIGNIFICANT CHANGE UP (ref 3.8–10.5)
WBC # FLD AUTO: 9.5 K/UL — SIGNIFICANT CHANGE UP (ref 3.8–10.5)
WBC # FLD AUTO: 9.99 K/UL — SIGNIFICANT CHANGE UP (ref 3.8–10.5)

## 2023-02-26 RX ORDER — HALOPERIDOL DECANOATE 100 MG/ML
0.5 INJECTION INTRAMUSCULAR ONCE
Refills: 0 | Status: COMPLETED | OUTPATIENT
Start: 2023-02-26 | End: 2023-02-26

## 2023-02-26 RX ORDER — TRAMADOL HYDROCHLORIDE 50 MG/1
25 TABLET ORAL EVERY 6 HOURS
Refills: 0 | Status: DISCONTINUED | OUTPATIENT
Start: 2023-02-27 | End: 2023-03-01

## 2023-02-26 RX ADMIN — HEPARIN SODIUM 1200 UNIT(S)/HR: 5000 INJECTION INTRAVENOUS; SUBCUTANEOUS at 07:55

## 2023-02-26 RX ADMIN — HEPARIN SODIUM 1100 UNIT(S)/HR: 5000 INJECTION INTRAVENOUS; SUBCUTANEOUS at 09:17

## 2023-02-26 RX ADMIN — BRIMONIDINE TARTRATE 1 DROP(S): 2 SOLUTION/ DROPS OPHTHALMIC at 06:04

## 2023-02-26 RX ADMIN — Medication 975 MILLIGRAM(S): at 23:21

## 2023-02-26 RX ADMIN — ATORVASTATIN CALCIUM 40 MILLIGRAM(S): 80 TABLET, FILM COATED ORAL at 22:21

## 2023-02-26 RX ADMIN — Medication 1300 MILLIGRAM(S): at 18:32

## 2023-02-26 RX ADMIN — DORZOLAMIDE HYDROCHLORIDE TIMOLOL MALEATE 1 DROP(S): 20; 5 SOLUTION/ DROPS OPHTHALMIC at 06:04

## 2023-02-26 RX ADMIN — AMIODARONE HYDROCHLORIDE 200 MILLIGRAM(S): 400 TABLET ORAL at 06:04

## 2023-02-26 RX ADMIN — Medication 1 DROP(S): at 22:22

## 2023-02-26 RX ADMIN — HEPARIN SODIUM 1100 UNIT(S)/HR: 5000 INJECTION INTRAVENOUS; SUBCUTANEOUS at 15:52

## 2023-02-26 RX ADMIN — Medication 2: at 18:32

## 2023-02-26 RX ADMIN — Medication 50 MILLIGRAM(S): at 22:21

## 2023-02-26 RX ADMIN — TRAMADOL HYDROCHLORIDE 25 MILLIGRAM(S): 50 TABLET ORAL at 08:54

## 2023-02-26 RX ADMIN — TRAMADOL HYDROCHLORIDE 25 MILLIGRAM(S): 50 TABLET ORAL at 03:10

## 2023-02-26 RX ADMIN — HEPARIN SODIUM 1100 UNIT(S)/HR: 5000 INJECTION INTRAVENOUS; SUBCUTANEOUS at 19:49

## 2023-02-26 RX ADMIN — ISOSORBIDE MONONITRATE 60 MILLIGRAM(S): 60 TABLET, EXTENDED RELEASE ORAL at 12:45

## 2023-02-26 RX ADMIN — Medication 1300 MILLIGRAM(S): at 06:02

## 2023-02-26 RX ADMIN — TRAMADOL HYDROCHLORIDE 25 MILLIGRAM(S): 50 TABLET ORAL at 02:10

## 2023-02-26 RX ADMIN — HALOPERIDOL DECANOATE 0.5 MILLIGRAM(S): 100 INJECTION INTRAMUSCULAR at 00:33

## 2023-02-26 RX ADMIN — Medication 975 MILLIGRAM(S): at 06:01

## 2023-02-26 RX ADMIN — TRAMADOL HYDROCHLORIDE 25 MILLIGRAM(S): 50 TABLET ORAL at 09:24

## 2023-02-26 RX ADMIN — HEPARIN SODIUM 1250 UNIT(S)/HR: 5000 INJECTION INTRAVENOUS; SUBCUTANEOUS at 23:17

## 2023-02-26 RX ADMIN — Medication 1 TABLET(S): at 12:44

## 2023-02-26 RX ADMIN — HEPARIN SODIUM 1200 UNIT(S)/HR: 5000 INJECTION INTRAVENOUS; SUBCUTANEOUS at 00:25

## 2023-02-26 RX ADMIN — Medication 975 MILLIGRAM(S): at 22:21

## 2023-02-26 RX ADMIN — Medication 2: at 12:40

## 2023-02-26 RX ADMIN — AMLODIPINE BESYLATE 2.5 MILLIGRAM(S): 2.5 TABLET ORAL at 06:02

## 2023-02-26 RX ADMIN — Medication 2: at 07:56

## 2023-02-26 RX ADMIN — Medication 50 MILLIGRAM(S): at 06:02

## 2023-02-26 RX ADMIN — PANTOPRAZOLE SODIUM 40 MILLIGRAM(S): 20 TABLET, DELAYED RELEASE ORAL at 06:03

## 2023-02-26 RX ADMIN — CHLORHEXIDINE GLUCONATE 1 APPLICATION(S): 213 SOLUTION TOPICAL at 08:45

## 2023-02-26 RX ADMIN — Medication 975 MILLIGRAM(S): at 12:44

## 2023-02-26 RX ADMIN — Medication 1 DROP(S): at 06:03

## 2023-02-26 RX ADMIN — Medication 50 MILLIGRAM(S): at 12:45

## 2023-02-26 NOTE — PROGRESS NOTE ADULT - SUBJECTIVE AND OBJECTIVE BOX
[facility]    REVIEW OF SYSTEMS:  CONSTITUTIONAL: No fever,  no  weight loss  ENT:  No  tinnitus,   no   vertigo  NECK: No pain or stiffness  RESPIRATORY: No cough, wheezing, chills or hemoptysis;    No Shortness of Breath  CARDIOVASCULAR: No chest pain, palpitations, dizziness  GASTROINTESTINAL: No abdominal or epigastric pain. No nausea, vomiting, or hematemesis; No diarrhea  No melena or hematochezia.  GENITOURINARY: No dysuria, frequency, hematuria, or incontinence  NEUROLOGICAL: No headaches  SKIN: No itching,  no   rash  LYMPH Nodes: No enlarged glands  ENDOCRINE: No heat or cold intolerance  MUSCULOSKELETAL: No joint pain or swelling  PSYCHIATRIC: No depression, anxiety  HEME/LYMPH: No easy bruising, or bleeding gums  ALLERGY AND IMMUNOLOGIC: No hives or eczema	    MEDICATIONS  (STANDING):  acetaminophen     Tablet .. 975 milliGRAM(s) Oral every 8 hours  aMIOdarone    Tablet 200 milliGRAM(s) Oral daily  amLODIPine   Tablet 2.5 milliGRAM(s) Oral daily  atorvastatin 40 milliGRAM(s) Oral at bedtime  brimonidine 0.2% Ophthalmic Solution 1 Drop(s) Both EYES two times a day  chlorhexidine 2% Cloths 1 Application(s) Topical daily  dextrose 5%. 1000 milliLiter(s) (100 mL/Hr) IV Continuous <Continuous>  dextrose 5%. 1000 milliLiter(s) (50 mL/Hr) IV Continuous <Continuous>  dextrose 50% Injectable 25 Gram(s) IV Push once  dextrose 50% Injectable 12.5 Gram(s) IV Push once  dextrose 50% Injectable 25 Gram(s) IV Push once  dorzolamide 2%/timolol 0.5% Ophthalmic Solution 1 Drop(s) Both EYES two times a day  glucagon  Injectable 1 milliGRAM(s) IntraMuscular once  heparin  Infusion. 1050 Unit(s)/Hr (10.5 mL/Hr) IV Continuous <Continuous>  hydrALAZINE 50 milliGRAM(s) Oral three times a day  insulin lispro (ADMELOG) corrective regimen sliding scale   SubCutaneous three times a day before meals  insulin lispro (ADMELOG) corrective regimen sliding scale   SubCutaneous at bedtime  isosorbide   mononitrate ER Tablet (IMDUR) 60 milliGRAM(s) Oral daily  multivitamin 1 Tablet(s) Oral daily  pantoprazole    Tablet 40 milliGRAM(s) Oral before breakfast  polyethylene glycol 3350 17 Gram(s) Oral at bedtime  prednisoLONE acetate 1% Suspension 1 Drop(s) Left EYE three times a day  senna 2 Tablet(s) Oral at bedtime  sodium bicarbonate 1300 milliGRAM(s) Oral two times a day    MEDICATIONS  (PRN):  dextrose Oral Gel 15 Gram(s) Oral once PRN Blood Glucose LESS THAN 70 milliGRAM(s)/deciliter  heparin   Injectable 4700 Unit(s) IV Push every 6 hours PRN For aPTT less than 40  traMADol 25 milliGRAM(s) Oral every 6 hours PRN Mild Pain (1 - 3)      Vital Signs Last 24 Hrs  T(C): 36.4 (26 Feb 2023 05:23), Max: 36.7 (25 Feb 2023 13:52)  T(F): 97.6 (26 Feb 2023 05:23), Max: 98.1 (25 Feb 2023 16:47)  HR: 62 (26 Feb 2023 05:23) (60 - 75)  BP: 166/69 (26 Feb 2023 01:02) (165/69 - 171/76)  BP(mean): --  RR: 18 (26 Feb 2023 05:23) (18 - 18)  SpO2: 100% (26 Feb 2023 05:23) (96% - 100%)    Parameters below as of 26 Feb 2023 05:23  Patient On (Oxygen Delivery Method): room air      CAPILLARY BLOOD GLUCOSE      POCT Blood Glucose.: 187 mg/dL (25 Feb 2023 21:47)  POCT Blood Glucose.: 241 mg/dL (25 Feb 2023 18:03)  POCT Blood Glucose.: 193 mg/dL (25 Feb 2023 12:01)  POCT Blood Glucose.: 218 mg/dL (25 Feb 2023 09:24)    I&O's Summary    25 Feb 2023 07:01  -  26 Feb 2023 07:00  --------------------------------------------------------  IN: 837 mL / OUT: 950 mL / NET: -113 mL          Appearance: Normal	  HEENT:   Normal oral mucosa, PERRL, EOMI	  Lymphatic: No lymphadenopathy  Cardiovascular: Normal S1 S2, No JVD  Respiratory: Lungs clear to auscultation	  Psychiatry: A & O x 3, Mood & affect appropriate  Gastrointestinal:  Soft, Non-tender, + BS	  Skin: No rash, No ecchymoses	  Extremities: Normal range of motion  Vascular: Peripheral pulses palpable bilaterally    LABS:                        9.0    12.86 )-----------( 232      ( 25 Feb 2023 07:14 )             30.6     02-25    139  |  107  |  75<H>  ----------------------------<  216<H>  4.8   |  18<L>  |  4.68<H>    Ca    8.8      25 Feb 2023 07:14      PTT - ( 25 Feb 2023 22:27 )  PTT:68.2 sec                Thyroid Stimulating Hormone, Serum: 0.71 uIU/mL (02-21 @ 16:38)          Consultant(s) Notes Reviewed:      Care Discussed with Consultants/Other Providers:

## 2023-02-26 NOTE — PROGRESS NOTE ADULT - ASSESSMENT
83 yo male w/ pmhx of CKD, DM, HTN presented to the ED BIBEMS 2/2 due to fall. Right hip femoral fracture;  2/2 due to fall and NPO On 2/19/2023 for OR for hip surgery. noticed with abn creatinine 4.9 now at 4.4 pt and son state pt does not have ckd however, saw a nephrologist in [facility] [place] last year once only. was given a medication does not recall what med was given.      1- RASHAAD on ckd suspected  2- HTN   3- DM   4- hyperlipidemia   5- proteinuria  6- pneumonia  7- acidosis     as per Dr. Elliott d/w primary renal outpt team discussion cr last year of 4.   s/p hip surgery   hold losartan for now  cr steady and does not appear uremic at present   sodium bicarbonate decrease to 650 mg bid for now   abx per primary team  cont with lipitor   hydralazine to cont   cards follow up for arrhythmias   holding GELA due to recent covid infection and likely hypercoagulable state   trend uo   PT  tele monitor    strict I/O

## 2023-02-26 NOTE — DIETITIAN INITIAL EVALUATION ADULT - ADD RECOMMEND
1) Continue current diet order; consistent carbohydrate, low sodium diet.   2) Continue Glucerna 2x/day to optimize PO intake.  3) Encourage adequate intake of meals and supplements to optimize PO intake.   4) Monitor PO intake/tolerance, weights, labs, hydration status, bowels, and skin integrity.

## 2023-02-26 NOTE — DIETITIAN INITIAL EVALUATION ADULT - ENERGY INTAKE
Intake: Pt is currently ordered for a consistent carbohydrate, low sodium diet. Per RN, pt with variable PO intake, states pt's son brings food from home. During RD visit, pt's son had just stepped out to purchase lunch for pt. RN reports pt is drinking Glucerna 2x/day.

## 2023-02-26 NOTE — DIETITIAN INITIAL EVALUATION ADULT - REASON
Nutrition Focused Physical Exam deferred at this time, pt unable to provide consent. Pt appears thin, muscle/fat depletion noted, likely sarcopenia.

## 2023-02-26 NOTE — PROVIDER CONTACT NOTE (OTHER) - ACTION/TREATMENT ORDERED:
pt + for COVID on 2/6/23.  Isolated >10days, no symptoms of COVID.  Isolation discontinued as per guideline. pt + for COVID on 2/16/23.  Isolated >10days, no symptoms of COVID.  Isolation discontinued as per guideline.

## 2023-02-26 NOTE — PROGRESS NOTE ADULT - SUBJECTIVE AND OBJECTIVE BOX
[facility]   [phone]  RENAL FOLLOW UP NOTE  --------------------------------------------------------------------------------  Chief Complaint:    24 hour events/subjective:    seen earlier   responsive interactive forgetful     PAST HISTORY  --------------------------------------------------------------------------------  No significant changes to PMH, PSH, FHx, SHx, unless otherwise noted    ALLERGIES & MEDICATIONS  --------------------------------------------------------------------------------  Allergies    No Known Allergies    Intolerances      Standing Inpatient Medications  acetaminophen     Tablet .. 975 milliGRAM(s) Oral every 8 hours  aMIOdarone    Tablet 200 milliGRAM(s) Oral daily  amLODIPine   Tablet 2.5 milliGRAM(s) Oral daily  atorvastatin 40 milliGRAM(s) Oral at bedtime  brimonidine 0.2% Ophthalmic Solution 1 Drop(s) Both EYES two times a day  chlorhexidine 2% Cloths 1 Application(s) Topical daily  dextrose 5%. 1000 milliLiter(s) IV Continuous <Continuous>  dextrose 5%. 1000 milliLiter(s) IV Continuous <Continuous>  dextrose 50% Injectable 25 Gram(s) IV Push once  dextrose 50% Injectable 12.5 Gram(s) IV Push once  dextrose 50% Injectable 25 Gram(s) IV Push once  dorzolamide 2%/timolol 0.5% Ophthalmic Solution 1 Drop(s) Both EYES two times a day  glucagon  Injectable 1 milliGRAM(s) IntraMuscular once  heparin  Infusion. 1050 Unit(s)/Hr IV Continuous <Continuous>  hydrALAZINE 50 milliGRAM(s) Oral three times a day  insulin lispro (ADMELOG) corrective regimen sliding scale   SubCutaneous three times a day before meals  insulin lispro (ADMELOG) corrective regimen sliding scale   SubCutaneous at bedtime  isosorbide   mononitrate ER Tablet (IMDUR) 60 milliGRAM(s) Oral daily  multivitamin 1 Tablet(s) Oral daily  pantoprazole    Tablet 40 milliGRAM(s) Oral before breakfast  polyethylene glycol 3350 17 Gram(s) Oral at bedtime  prednisoLONE acetate 1% Suspension 1 Drop(s) Left EYE three times a day  senna 2 Tablet(s) Oral at bedtime  sodium bicarbonate 1300 milliGRAM(s) Oral two times a day    PRN Inpatient Medications  dextrose Oral Gel 15 Gram(s) Oral once PRN  heparin   Injectable 4700 Unit(s) IV Push every 6 hours PRN  traMADol 25 milliGRAM(s) Oral every 6 hours PRN      REVIEW OF SYSTEMS  --------------------------------------------------------------------------------    Gen: denies  fevers/chills,  CVS: denies chest pain/palpitations  Resp: denies SOB/Cough  GI: Denies N/V/Abd pain  : Denies dysuria    VITALS/PHYSICAL EXAM  --------------------------------------------------------------------------------  T(C): 36.3 (02-26-23 @ 17:52), Max: 36.6 (02-26-23 @ 13:57)  HR: 88 (02-26-23 @ 17:52) (62 - 88)  BP: 134/60 (02-26-23 @ 17:52) (134/60 - 171/73)  RR: 17 (02-26-23 @ 17:52) (17 - 18)  SpO2: 93% (02-26-23 @ 17:52) (93% - 100%)  Wt(kg): --        02-25-23 @ 07:01  -  02-26-23 @ 07:00  --------------------------------------------------------  IN: 837 mL / OUT: 950 mL / NET: -113 mL    02-26-23 @ 07:01  -  02-26-23 @ 18:33  --------------------------------------------------------  IN: 831 mL / OUT: 100 mL / NET: 731 mL      Physical Exam:  	  Gen:  responsive communicative  	Pulm: Decreased breath sounds b/l bases. no rales or ronchi or wheezing  	CV: RRR, S1/S2. no rub  	Abd: +BS, soft, nontender/nondistended  	: No bladder distention               Extremity: No cyanosis, no edema no myoclonus/asterixis     LABS/STUDIES  --------------------------------------------------------------------------------              8.6    9.99  >-----------<  237      [02-26-23 @ 08:32]              27.9     140  |  108  |  66  ----------------------------<  214      [02-26-23 @ 08:32]  4.6   |  22  |  4.59        Ca     8.7     [02-26-23 @ 08:32]        PTT: 66.3       [02-26-23 @ 15:16]      Creatinine Trend:  SCr 4.59 [02-26 @ 08:32]  SCr 4.60 [02-26 @ 07:15]  SCr 4.68 [02-25 @ 07:14]  SCr 4.65 [02-24 @ 04:45]  SCr 4.87 [02-23 @ 07:15]              Urinalysis - [02-16-23 @ 22:15]      Color Yellow / Appearance Clear / SG 1.015 / pH 6.0      Gluc 1000 / Ketone Trace  / Bili Negative / Urobili Negative       Blood Small / Protein 500 / Leuk Est Negative / Nitrite Negative      RBC 0-2 / WBC Negative / Hyaline  / Gran  / Sq Epi  / Non Sq Epi Occasional / Bacteria Occasional      PTH -- (Ca 8.6)      [02-20-23 @ 05:39]   265  TSH 0.71      [02-21-23 @ 16:38]

## 2023-02-26 NOTE — DIETITIAN INITIAL EVALUATION ADULT - PERTINENT LABORATORY DATA
02-26    140  |  108  |  66<H>  ----------------------------<  214<H>  4.6   |  22  |  4.59<H>    Ca    8.7      26 Feb 2023 08:32    POCT Blood Glucose.: 241 mg/dL (02-26-23 @ 12:29)  A1C with Estimated Average Glucose Result: 6.8 % (02-18-23 @ 07:05)

## 2023-02-26 NOTE — PROGRESS NOTE ADULT - ASSESSMENT
83 yo male      w/ pmhx of CKD, DM, HTN        presented to the ED BIBEMS 2/2 due to fall.      s/p   Right hip femoral fracture due to fall./  s/p  surg        TTE ,   EF 40 %. EKG notes inferior and septal M       DM/   HTN/  HL;D          on   lipitor and Imdur.             CKD V ,  stable         AFib,  on  iv  heparin     pt  with  mlple pauses. /  tele    on  amio, norvasc,  lipitor, , hydralazine 83 yo male      w/ pmhx of CKD, DM, HTN        presented to the ED BIBEMS 2/2 due to fall.      s/p   Right hip femoral fracture due to fall./  s/p  surg        TTE ,   EF 40 %. EKG notes inferior and septal M       DM/   HTN/  HL;D          on   lipitor and Imdur.             CKD  4,  ,  stable       AFib,  on  iv  heparin     pt  with  mlple pauses./   [place] and pause  of  3/8  sec    tele    on  amio, norvasc,  lipitor, , hydralazine    may  nee ppm

## 2023-02-26 NOTE — PROVIDER CONTACT NOTE (CRITICAL VALUE NOTIFICATION) - ACTION/TREATMENT ORDERED:
ACS nomogram followed, heparin gtt stopped for 1 hour, continue to monitor for signs of bleeding
Redraw BMP
no new orders rec'd at this time

## 2023-02-26 NOTE — DIETITIAN INITIAL EVALUATION ADULT - PERTINENT MEDS FT
MEDICATIONS  (STANDING):  acetaminophen     Tablet .. 975 milliGRAM(s) Oral every 8 hours  aMIOdarone    Tablet 200 milliGRAM(s) Oral daily  amLODIPine   Tablet 2.5 milliGRAM(s) Oral daily  atorvastatin 40 milliGRAM(s) Oral at bedtime  brimonidine 0.2% Ophthalmic Solution 1 Drop(s) Both EYES two times a day  chlorhexidine 2% Cloths 1 Application(s) Topical daily  dextrose 5%. 1000 milliLiter(s) (100 mL/Hr) IV Continuous <Continuous>  dextrose 5%. 1000 milliLiter(s) (50 mL/Hr) IV Continuous <Continuous>  dextrose 50% Injectable 25 Gram(s) IV Push once  dextrose 50% Injectable 12.5 Gram(s) IV Push once  dextrose 50% Injectable 25 Gram(s) IV Push once  dorzolamide 2%/timolol 0.5% Ophthalmic Solution 1 Drop(s) Both EYES two times a day  glucagon  Injectable 1 milliGRAM(s) IntraMuscular once  heparin  Infusion. 1050 Unit(s)/Hr (10.5 mL/Hr) IV Continuous <Continuous>  hydrALAZINE 50 milliGRAM(s) Oral three times a day  insulin lispro (ADMELOG) corrective regimen sliding scale   SubCutaneous three times a day before meals  insulin lispro (ADMELOG) corrective regimen sliding scale   SubCutaneous at bedtime  isosorbide   mononitrate ER Tablet (IMDUR) 60 milliGRAM(s) Oral daily  multivitamin 1 Tablet(s) Oral daily  pantoprazole    Tablet 40 milliGRAM(s) Oral before breakfast  polyethylene glycol 3350 17 Gram(s) Oral at bedtime  prednisoLONE acetate 1% Suspension 1 Drop(s) Left EYE three times a day  senna 2 Tablet(s) Oral at bedtime  sodium bicarbonate 1300 milliGRAM(s) Oral two times a day    MEDICATIONS  (PRN):  dextrose Oral Gel 15 Gram(s) Oral once PRN Blood Glucose LESS THAN 70 milliGRAM(s)/deciliter  heparin   Injectable 4700 Unit(s) IV Push every 6 hours PRN For aPTT less than 40  traMADol 25 milliGRAM(s) Oral every 6 hours PRN Mild Pain (1 - 3)

## 2023-02-26 NOTE — DIETITIAN INITIAL EVALUATION ADULT - NS FNS DIET ORDER
Diet, Consistent Carbohydrate/No Snacks:   No Concentrated Potassium  Supplement Feeding Modality:  Oral  Glucerna Shake Cans or Servings Per Day:  1       Frequency:  Two Times a day (02-25-23 @ 10:19) [Active]

## 2023-02-26 NOTE — PROGRESS NOTE ADULT - SUBJECTIVE AND OBJECTIVE BOX
CARDIOLOGY     PROGRESS  NOTE   ________________________________________________    CHIEF COMPLAINT:Patient is a 83y old  Male who presents with a chief complaint of Per chart, "81 yo male w/ pmhx of CKD, DM, HTN presented to the ED BIBEMS 2/2 due to fall. Right hip femoral fracture;  2/2 due to fall and NPO On 2/19/2023 for OR for hip surgery."  no complain    	  REVIEW OF SYSTEMS:  CONSTITUTIONAL: No fever, weight loss, or fatigue  EYES: No eye pain, visual disturbances, or discharge  ENT:  No difficulty hearing, tinnitus, vertigo; No sinus or throat pain  NECK: No pain or stiffness  RESPIRATORY: No cough, wheezing, chills or hemoptysis; No Shortness of Breath  CARDIOVASCULAR: No chest pain, palpitations, passing out, dizziness, or leg swelling  GASTROINTESTINAL: No abdominal or epigastric pain. No nausea, vomiting, or hematemesis; No diarrhea or constipation. No melena or hematochezia.  GENITOURINARY: No dysuria, frequency, hematuria, or incontinence  NEUROLOGICAL: No headaches, memory loss, loss of strength, numbness, or tremors  SKIN: No itching, burning, rashes, or lesions   LYMPH Nodes: No enlarged glands  ENDOCRINE: No heat or cold intolerance; No hair loss  MUSCULOSKELETAL: No joint pain or swelling; No muscle, back, or extremity pain  PSYCHIATRIC: No depression, anxiety, mood swings, or difficulty sleeping  HEME/LYMPH: No easy bruising, or bleeding gums  ALLERGY AND IMMUNOLOGIC: No hives or eczema	    [ ] All others negative	  [ ] Unable to obtain    PHYSICAL EXAM:  T(C): 36.6 (02-26-23 @ 13:57), Max: 36.7 (02-25-23 @ 16:47)  HR: 79 (02-26-23 @ 13:57) (60 - 79)  BP: 154/83 (02-26-23 @ 13:57) (154/83 - 171/73)  RR: 18 (02-26-23 @ 13:57) (18 - 18)  SpO2: 97% (02-26-23 @ 13:57) (96% - 100%)  Wt(kg): --  I&O's Summary    25 Feb 2023 07:01  -  26 Feb 2023 07:00  --------------------------------------------------------  IN: 837 mL / OUT: 950 mL / NET: -113 mL    26 Feb 2023 07:01  -  26 Feb 2023 16:10  --------------------------------------------------------  IN: 480 mL / OUT: 0 mL / NET: 480 mL        Appearance: Normal	  HEENT:   Normal oral mucosa, PERRL, EOMI	  Lymphatic: No lymphadenopathy  Cardiovascular: Normal S1 S2, No JVD, + murmurs, No edema  Respiratory: rhonchi  Gastrointestinal:  Soft, Non-tender, + BS	  Skin: No rashes, No ecchymoses, No cyanosis	  Neurologic: Non-focal  Extremities: Normal range of motion, No clubbing, cyanosis or edema  Vascular: Peripheral pulses palpable 2+ bilaterally    MEDICATIONS  (STANDING):  acetaminophen     Tablet .. 975 milliGRAM(s) Oral every 8 hours  aMIOdarone    Tablet 200 milliGRAM(s) Oral daily  amLODIPine   Tablet 2.5 milliGRAM(s) Oral daily  atorvastatin 40 milliGRAM(s) Oral at bedtime  brimonidine 0.2% Ophthalmic Solution 1 Drop(s) Both EYES two times a day  chlorhexidine 2% Cloths 1 Application(s) Topical daily  dextrose 5%. 1000 milliLiter(s) (100 mL/Hr) IV Continuous <Continuous>  dextrose 5%. 1000 milliLiter(s) (50 mL/Hr) IV Continuous <Continuous>  dextrose 50% Injectable 25 Gram(s) IV Push once  dextrose 50% Injectable 12.5 Gram(s) IV Push once  dextrose 50% Injectable 25 Gram(s) IV Push once  dorzolamide 2%/timolol 0.5% Ophthalmic Solution 1 Drop(s) Both EYES two times a day  glucagon  Injectable 1 milliGRAM(s) IntraMuscular once  heparin  Infusion. 1050 Unit(s)/Hr (10.5 mL/Hr) IV Continuous <Continuous>  hydrALAZINE 50 milliGRAM(s) Oral three times a day  insulin lispro (ADMELOG) corrective regimen sliding scale   SubCutaneous three times a day before meals  insulin lispro (ADMELOG) corrective regimen sliding scale   SubCutaneous at bedtime  isosorbide   mononitrate ER Tablet (IMDUR) 60 milliGRAM(s) Oral daily  multivitamin 1 Tablet(s) Oral daily  pantoprazole    Tablet 40 milliGRAM(s) Oral before breakfast  polyethylene glycol 3350 17 Gram(s) Oral at bedtime  prednisoLONE acetate 1% Suspension 1 Drop(s) Left EYE three times a day  senna 2 Tablet(s) Oral at bedtime  sodium bicarbonate 1300 milliGRAM(s) Oral two times a day      TELEMETRY: 	    ECG:  	  RADIOLOGY:  OTHER: 	  	  LABS:	 	    CARDIAC MARKERS:                                8.6    9.99  )-----------( 237      ( 26 Feb 2023 08:32 )             27.9     02-26    140  |  108  |  66<H>  ----------------------------<  214<H>  4.6   |  22  |  4.59<H>    Ca    8.7      26 Feb 2023 08:32      proBNP: Serum Pro-Brain Natriuretic Peptide: 6136 pg/mL (02-17 @ 02:45)    Lipid Profile:   HgA1c:   TSH: Thyroid Stimulating Hormone, Serum: 0.71 uIU/mL (02-21 @ 16:38)  Thyroid Stimulating Hormone, Serum: 1.55 uIU/mL (02-21 @ 07:17)    PTT - ( 26 Feb 2023 15:16 )  PTT:66.3 sec      Assessment and plan  ---------------------------  82M PMHx of DM presenting with right hip pain s/p mechanical fall today. Describes slipping in the kitchen and falling on his right side w/ right sided hip pain mild. Denies any LOC. Denies any antiplatelet or AC.   pt with hx of htn, ckd, no known hx of cad, who is very active with no cardiac complain ,s/p tripped and fall, pt was admitted to [facility] had mild elevation of trop with no sig jump , no cpk and MB was drawn. pt with no chest pain.  had a long discussion with son health care proxy the results of blood test, echo discussed with length with him the possibility of sig CAD and risk of MI and death  in addition to other medical co morbidity  renal disease explained to the health care proxy son  and son claims father  can not live with hip fx and not  being able to walk and understands the risk.  continue beta blocker and hydralazine , keep hgb at least >8  increase beta blocker to 25 mg po bid  PAF/ flutter in [place] last night  start on amio try to keep in nsr  AC if no contraindication from ortho  asa daily  lipitor  dc ivf  renal may need HD in near future  tele nsr in 60 to 70 first degree av block  decrease bp, decrease hydralazine dose, will adjust bp meds  hr is better start on lopressor 12.5 mg bid , change amio to 200 mg daily  discussed with pt and son possibilty of ppm  physical therapy  pt needs to fu closely reg chf/ cardiomyopathy  pt with 6 second pause yesterday with ventricular stand still  pt with a.fib /RVR//bradycardia and hx of syncope and also needs beta blocker not only to control PAF as well cad and chf pt has sig conduction disease  I highly recommend PPM  will discuss with pt and son and they can decide  pt is at risk of recurrent syncope specially on ac worrisome discussed with son and family and will let me know

## 2023-02-26 NOTE — DIETITIAN INITIAL EVALUATION ADULT - REASON FOR ADMISSION
Per chart, "83 yo male w/ pmhx of CKD, DM, HTN presented to the ED BIBEMS 2/2 due to fall. Right hip femoral fracture;  2/2 due to fall and NPO On 2/19/2023 for OR for hip surgery."

## 2023-02-26 NOTE — DIETITIAN INITIAL EVALUATION ADULT - ORAL INTAKE PTA/DIET HISTORY
No specific diet hx noted. Baseline tolerance to chewing/swallowing unknown; baseline provision of energy/ nutrient intake unclear. NKFA/intolerances noted. Pt with hx of DM, HbA1c 6.8% (2/18/2023), suggests good glycemic control.

## 2023-02-26 NOTE — DIETITIAN INITIAL EVALUATION ADULT - REASON INDICATOR FOR ASSESSMENT
Nutrition consult warranted for: nutrition services assessment and education   Information obtained from: electronic medical record, RN. Of note, pt is disorientated and agitated, attempted to interview however pt dismissed RD.   Chart reviewed, events noted.

## 2023-02-26 NOTE — DIETITIAN INITIAL EVALUATION ADULT - PHYSCIAL ASSESSMENT
Weight Hx Per:  - Source: patient  - UBW: unknown   - Reported weight changes: None     [facility]  [facility] (12/24/2018)  - 77.1 kg (2/19/2023)    Current Admission Weights:  - Dosing weight: 170 pounds (77.1 kg) (2/19)  - Daily weight: 76.5 kg (2/22)    Weight Change:  - Weight downtrending likely in the setting fluid shifts, pt is being diuresed in house.     **  Will continue to monitor weight trends as available/able.     IBW: 154 pounds   %IBW: 110 %

## 2023-02-27 LAB
ANION GAP SERPL CALC-SCNC: 15 MMOL/L — SIGNIFICANT CHANGE UP (ref 5–17)
APTT BLD: 59.7 SEC — HIGH (ref 27.5–35.5)
APTT BLD: 73.8 SEC — HIGH (ref 27.5–35.5)
APTT BLD: 81 SEC — HIGH (ref 27.5–35.5)
BUN SERPL-MCNC: 66 MG/DL — HIGH (ref 7–23)
CALCIUM SERPL-MCNC: 8.9 MG/DL — SIGNIFICANT CHANGE UP (ref 8.4–10.5)
CHLORIDE SERPL-SCNC: 106 MMOL/L — SIGNIFICANT CHANGE UP (ref 96–108)
CO2 SERPL-SCNC: 19 MMOL/L — LOW (ref 22–31)
CREAT SERPL-MCNC: 4.6 MG/DL — HIGH (ref 0.5–1.3)
EGFR: 12 ML/MIN/1.73M2 — LOW
GLUCOSE BLDC GLUCOMTR-MCNC: 174 MG/DL — HIGH (ref 70–99)
GLUCOSE BLDC GLUCOMTR-MCNC: 181 MG/DL — HIGH (ref 70–99)
GLUCOSE BLDC GLUCOMTR-MCNC: 197 MG/DL — HIGH (ref 70–99)
GLUCOSE BLDC GLUCOMTR-MCNC: 238 MG/DL — HIGH (ref 70–99)
GLUCOSE SERPL-MCNC: 216 MG/DL — HIGH (ref 70–99)
POTASSIUM SERPL-MCNC: 4.9 MMOL/L — SIGNIFICANT CHANGE UP (ref 3.5–5.3)
POTASSIUM SERPL-SCNC: 4.9 MMOL/L — SIGNIFICANT CHANGE UP (ref 3.5–5.3)
SODIUM SERPL-SCNC: 140 MMOL/L — SIGNIFICANT CHANGE UP (ref 135–145)

## 2023-02-27 PROCEDURE — 93010 ELECTROCARDIOGRAM REPORT: CPT

## 2023-02-27 RX ORDER — ACETAMINOPHEN 500 MG
1000 TABLET ORAL ONCE
Refills: 0 | Status: COMPLETED | OUTPATIENT
Start: 2023-02-27 | End: 2023-02-27

## 2023-02-27 RX ORDER — LANOLIN ALCOHOL/MO/W.PET/CERES
5 CREAM (GRAM) TOPICAL ONCE
Refills: 0 | Status: COMPLETED | OUTPATIENT
Start: 2023-02-27 | End: 2023-02-27

## 2023-02-27 RX ADMIN — ISOSORBIDE MONONITRATE 60 MILLIGRAM(S): 60 TABLET, EXTENDED RELEASE ORAL at 18:15

## 2023-02-27 RX ADMIN — Medication 50 MILLIGRAM(S): at 14:33

## 2023-02-27 RX ADMIN — Medication 50 MILLIGRAM(S): at 05:20

## 2023-02-27 RX ADMIN — Medication 400 MILLIGRAM(S): at 02:56

## 2023-02-27 RX ADMIN — TRAMADOL HYDROCHLORIDE 25 MILLIGRAM(S): 50 TABLET ORAL at 00:45

## 2023-02-27 RX ADMIN — Medication 1 DROP(S): at 22:15

## 2023-02-27 RX ADMIN — BRIMONIDINE TARTRATE 1 DROP(S): 2 SOLUTION/ DROPS OPHTHALMIC at 05:21

## 2023-02-27 RX ADMIN — HEPARIN SODIUM 1050 UNIT(S)/HR: 5000 INJECTION INTRAVENOUS; SUBCUTANEOUS at 21:11

## 2023-02-27 RX ADMIN — Medication 1: at 18:10

## 2023-02-27 RX ADMIN — HEPARIN SODIUM 1250 UNIT(S)/HR: 5000 INJECTION INTRAVENOUS; SUBCUTANEOUS at 07:02

## 2023-02-27 RX ADMIN — PANTOPRAZOLE SODIUM 40 MILLIGRAM(S): 20 TABLET, DELAYED RELEASE ORAL at 05:20

## 2023-02-27 RX ADMIN — Medication 1 DROP(S): at 05:21

## 2023-02-27 RX ADMIN — Medication 1300 MILLIGRAM(S): at 05:20

## 2023-02-27 RX ADMIN — TRAMADOL HYDROCHLORIDE 25 MILLIGRAM(S): 50 TABLET ORAL at 01:45

## 2023-02-27 RX ADMIN — DORZOLAMIDE HYDROCHLORIDE TIMOLOL MALEATE 1 DROP(S): 20; 5 SOLUTION/ DROPS OPHTHALMIC at 18:11

## 2023-02-27 RX ADMIN — Medication 975 MILLIGRAM(S): at 23:14

## 2023-02-27 RX ADMIN — ATORVASTATIN CALCIUM 40 MILLIGRAM(S): 80 TABLET, FILM COATED ORAL at 22:14

## 2023-02-27 RX ADMIN — Medication 1300 MILLIGRAM(S): at 18:09

## 2023-02-27 RX ADMIN — CHLORHEXIDINE GLUCONATE 1 APPLICATION(S): 213 SOLUTION TOPICAL at 14:16

## 2023-02-27 RX ADMIN — Medication 1000 MILLIGRAM(S): at 03:26

## 2023-02-27 RX ADMIN — HEPARIN SODIUM 1150 UNIT(S)/HR: 5000 INJECTION INTRAVENOUS; SUBCUTANEOUS at 14:16

## 2023-02-27 RX ADMIN — DORZOLAMIDE HYDROCHLORIDE TIMOLOL MALEATE 1 DROP(S): 20; 5 SOLUTION/ DROPS OPHTHALMIC at 05:22

## 2023-02-27 RX ADMIN — AMIODARONE HYDROCHLORIDE 200 MILLIGRAM(S): 400 TABLET ORAL at 05:20

## 2023-02-27 RX ADMIN — Medication 5 MILLIGRAM(S): at 00:45

## 2023-02-27 RX ADMIN — Medication 2: at 08:36

## 2023-02-27 RX ADMIN — Medication 975 MILLIGRAM(S): at 22:14

## 2023-02-27 RX ADMIN — BRIMONIDINE TARTRATE 1 DROP(S): 2 SOLUTION/ DROPS OPHTHALMIC at 18:12

## 2023-02-27 RX ADMIN — Medication 1: at 13:04

## 2023-02-27 RX ADMIN — HEPARIN SODIUM 1250 UNIT(S)/HR: 5000 INJECTION INTRAVENOUS; SUBCUTANEOUS at 06:13

## 2023-02-27 RX ADMIN — AMLODIPINE BESYLATE 2.5 MILLIGRAM(S): 2.5 TABLET ORAL at 05:20

## 2023-02-27 RX ADMIN — Medication 1 TABLET(S): at 14:33

## 2023-02-27 NOTE — PROGRESS NOTE ADULT - ASSESSMENT
81 yo male w/ pmhx of CKD, DM, HTN presented to the ED BIBEMS 2/2 due to fall. Right hip femoral fracture;  2/2 due to fall and NPO On 2/19/2023 for OR for hip surgery. noticed with abn creatinine 4.9 now at 4.4 pt and son state pt does not have ckd however, saw a nephrologist in [facility] last year once only. was given a medication does not recall what med was given.      1- RASHAAD on ckd suspected  2- HTN   3- DM   4- hyperlipidemia   5- proteinuria  6- pneumonia  7- acidosis       as per Dr. Elliott d/w primary renal outpt team discussion creatinine last year of 4 g/dL.   s/p hip surgery   cr steady and does not appear uremic at present   hold losartan for now  continue sodium bicarbonate 1300 mg BID  cont with lipitor   hydralazine 50 mg TID  cards follow up for arrhythmias   holding GELA due to recent covid infection and likely hypercoagulable state   strict I/O  trend creatinine and electrolytes daily  PT

## 2023-02-27 NOTE — PROGRESS NOTE ADULT - SUBJECTIVE AND OBJECTIVE BOX
[facility]   [phone]  RENAL FOLLOW UP NOTE  --------------------------------------------------------------------------------  Chief Complaint:    24 hour events/subjective:    patient seen and examined.   denies sob    PAST HISTORY  --------------------------------------------------------------------------------  No significant changes to PMH, PSH, FHx, SHx, unless otherwise noted    ALLERGIES & MEDICATIONS  --------------------------------------------------------------------------------  Allergies    No Known Allergies    Intolerances      Standing Inpatient Medications  acetaminophen     Tablet .. 975 milliGRAM(s) Oral every 8 hours  aMIOdarone    Tablet 200 milliGRAM(s) Oral daily  amLODIPine   Tablet 2.5 milliGRAM(s) Oral daily  atorvastatin 40 milliGRAM(s) Oral at bedtime  brimonidine 0.2% Ophthalmic Solution 1 Drop(s) Both EYES two times a day  chlorhexidine 2% Cloths 1 Application(s) Topical daily  dextrose 5%. 1000 milliLiter(s) IV Continuous <Continuous>  dextrose 5%. 1000 milliLiter(s) IV Continuous <Continuous>  dextrose 50% Injectable 25 Gram(s) IV Push once  dextrose 50% Injectable 12.5 Gram(s) IV Push once  dextrose 50% Injectable 25 Gram(s) IV Push once  dorzolamide 2%/timolol 0.5% Ophthalmic Solution 1 Drop(s) Both EYES two times a day  glucagon  Injectable 1 milliGRAM(s) IntraMuscular once  heparin  Infusion. 1050 Unit(s)/Hr IV Continuous <Continuous>  hydrALAZINE 50 milliGRAM(s) Oral three times a day  insulin lispro (ADMELOG) corrective regimen sliding scale   SubCutaneous three times a day before meals  insulin lispro (ADMELOG) corrective regimen sliding scale   SubCutaneous at bedtime  isosorbide   mononitrate ER Tablet (IMDUR) 60 milliGRAM(s) Oral daily  multivitamin 1 Tablet(s) Oral daily  pantoprazole    Tablet 40 milliGRAM(s) Oral before breakfast  polyethylene glycol 3350 17 Gram(s) Oral at bedtime  prednisoLONE acetate 1% Suspension 1 Drop(s) Left EYE three times a day  senna 2 Tablet(s) Oral at bedtime  sodium bicarbonate 1300 milliGRAM(s) Oral two times a day    PRN Inpatient Medications  dextrose Oral Gel 15 Gram(s) Oral once PRN  heparin   Injectable 4700 Unit(s) IV Push every 6 hours PRN  traMADol 25 milliGRAM(s) Oral every 6 hours PRN      REVIEW OF SYSTEMS  --------------------------------------------------------------------------------    Gen: denies fevers/chills,  CVS: denies chest pain/palpitations  Resp: denies SOB/Cough  GI: Denies N/V/Abd pain  : Denies dysuria    VITALS/PHYSICAL EXAM  --------------------------------------------------------------------------------  T(C): 36.6 (02-27-23 @ 13:29), Max: 37.1 (02-27-23 @ 08:44)  HR: 78 (02-27-23 @ 13:29) (71 - 93)  BP: 171/79 (02-27-23 @ 13:29) (134/60 - 171/79)  RR: 18 (02-27-23 @ 13:29) (17 - 18)  SpO2: 99% (02-27-23 @ 13:29) (93% - 99%)  Wt(kg): --        02-26-23 @ 07:01  -  02-27-23 @ 07:00  --------------------------------------------------------  IN: 1336.5 mL / OUT: 400 mL / NET: 936.5 mL    02-27-23 @ 07:01  -  02-27-23 @ 17:07  --------------------------------------------------------  IN: 500 mL / OUT: 300 mL / NET: 200 mL      Physical Exam:  	              Gen:  responsive communicative  	Pulm: Decreased breath sounds b/l bases. no rales or ronchi or wheezing  	CV: RRR, S1/S2. no rub  	Abd: +BS, soft, nontender/nondistended  	: No bladder distention               Extremity: No cyanosis, trace edema, no myoclonus/asterixis     LABS/STUDIES  --------------------------------------------------------------------------------              8.6    9.99  >-----------<  237      [02-26-23 @ 08:32]              27.9     140  |  106  |  66  ----------------------------<  216      [02-27-23 @ 05:06]  4.9   |  19  |  4.60        Ca     8.9     [02-27-23 @ 05:06]        PTT: 73.8       [02-27-23 @ 12:54]      Creatinine Trend:  SCr 4.60 [02-27 @ 05:06]  SCr 4.59 [02-26 @ 08:32]  SCr 4.60 [02-26 @ 07:15]  SCr 4.68 [02-25 @ 07:14]  SCr 4.65 [02-24 @ 04:45]              Urinalysis - [02-16-23 @ 22:15]      Color Yellow / Appearance Clear / SG 1.015 / pH 6.0      Gluc 1000 / Ketone Trace  / Bili Negative / Urobili Negative       Blood Small / Protein 500 / Leuk Est Negative / Nitrite Negative      RBC 0-2 / WBC Negative / Hyaline  / Gran  / Sq Epi  / Non Sq Epi Occasional / Bacteria Occasional      PTH -- (Ca 8.6)      [02-20-23 @ 05:39]   265  TSH 0.71      [02-21-23 @ 16:38]

## 2023-02-27 NOTE — PROGRESS NOTE ADULT - SUBJECTIVE AND OBJECTIVE BOX
afebrile    REVIEW OF SYSTEMS:  CONSTITUTIONAL: No fever,  no  weight loss  ENT:  No  tinnitus,   no   vertigo  NECK: No pain or stiffness  RESPIRATORY: No cough, wheezing, chills or hemoptysis;    No Shortness of Breath  CARDIOVASCULAR: No chest pain, palpitations, dizziness  GASTROINTESTINAL: No abdominal or epigastric pain. No nausea, vomiting, or hematemesis; No diarrhea  No melena or hematochezia.  GENITOURINARY: No dysuria, frequency, hematuria, or incontinence  NEUROLOGICAL: No headaches  SKIN: No itching,  no   rash  LYMPH Nodes: No enlarged glands  ENDOCRINE: No heat or cold intolerance  MUSCULOSKELETAL: No joint pain or swelling  PSYCHIATRIC: No depression, anxiety  HEME/LYMPH: No easy bruising, or bleeding gums  ALLERGY AND IMMUNOLOGIC: No hives or eczema	    MEDICATIONS  (STANDING):  acetaminophen     Tablet .. 975 milliGRAM(s) Oral every 8 hours  aMIOdarone    Tablet 200 milliGRAM(s) Oral daily  amLODIPine   Tablet 2.5 milliGRAM(s) Oral daily  atorvastatin 40 milliGRAM(s) Oral at bedtime  brimonidine 0.2% Ophthalmic Solution 1 Drop(s) Both EYES two times a day  chlorhexidine 2% Cloths 1 Application(s) Topical daily  dextrose 5%. 1000 milliLiter(s) (100 mL/Hr) IV Continuous <Continuous>  dextrose 5%. 1000 milliLiter(s) (50 mL/Hr) IV Continuous <Continuous>  dextrose 50% Injectable 25 Gram(s) IV Push once  dextrose 50% Injectable 12.5 Gram(s) IV Push once  dextrose 50% Injectable 25 Gram(s) IV Push once  dorzolamide 2%/timolol 0.5% Ophthalmic Solution 1 Drop(s) Both EYES two times a day  glucagon  Injectable 1 milliGRAM(s) IntraMuscular once  heparin  Infusion. 1050 Unit(s)/Hr (10.5 mL/Hr) IV Continuous <Continuous>  hydrALAZINE 50 milliGRAM(s) Oral three times a day  insulin lispro (ADMELOG) corrective regimen sliding scale   SubCutaneous three times a day before meals  insulin lispro (ADMELOG) corrective regimen sliding scale   SubCutaneous at bedtime  isosorbide   mononitrate ER Tablet (IMDUR) 60 milliGRAM(s) Oral daily  multivitamin 1 Tablet(s) Oral daily  pantoprazole    Tablet 40 milliGRAM(s) Oral before breakfast  polyethylene glycol 3350 17 Gram(s) Oral at bedtime  prednisoLONE acetate 1% Suspension 1 Drop(s) Left EYE three times a day  senna 2 Tablet(s) Oral at bedtime  sodium bicarbonate 1300 milliGRAM(s) Oral two times a day    MEDICATIONS  (PRN):  dextrose Oral Gel 15 Gram(s) Oral once PRN Blood Glucose LESS THAN 70 milliGRAM(s)/deciliter  heparin   Injectable 4700 Unit(s) IV Push every 6 hours PRN For aPTT less than 40  traMADol 25 milliGRAM(s) Oral every 6 hours PRN Mild Pain (1 - 3)      Vital Signs Last 24 Hrs  T(C): 36.3 (27 Feb 2023 05:00), Max: 36.7 (26 Feb 2023 21:37)  T(F): 97.3 (27 Feb 2023 05:00), Max: 98 (26 Feb 2023 21:37)  HR: 71 (27 Feb 2023 05:00) (71 - 93)  BP: 170/82 (27 Feb 2023 05:00) (134/60 - 171/73)  BP(mean): --  RR: 18 (27 Feb 2023 05:00) (17 - 18)  SpO2: 99% (27 Feb 2023 05:00) (93% - 99%)    Parameters below as of 27 Feb 2023 05:00  Patient On (Oxygen Delivery Method): room air      CAPILLARY BLOOD GLUCOSE      POCT Blood Glucose.: 210 mg/dL (26 Feb 2023 21:45)  POCT Blood Glucose.: 217 mg/dL (26 Feb 2023 17:52)  POCT Blood Glucose.: 241 mg/dL (26 Feb 2023 12:29)    I&O's Summary    26 Feb 2023 07:01  -  27 Feb 2023 07:00  --------------------------------------------------------  IN: 1336.5 mL / OUT: 400 mL / NET: 936.5 mL          Appearance: Normal	  HEENT:   Normal oral mucosa, PERRL, EOMI	  Lymphatic: No lymphadenopathy  Cardiovascular: Normal S1 S2, No JVD  Respiratory: Lungs clear to auscultation	  Psychiatry: A & O x 3, Mood & affect appropriate  Gastrointestinal:  Soft, Non-tender, + BS	  Skin: No rash, No ecchymoses	  Extremities: Normal range of motion  Vascular: Peripheral pulses palpable bilaterally    LABS:                        8.6    9.99  )-----------( 237      ( 26 Feb 2023 08:32 )             27.9     02-27    140  |  106  |  66<H>  ----------------------------<  216<H>  4.9   |  19<L>  |  4.60<H>    Ca    8.9      27 Feb 2023 05:06      PTT - ( 27 Feb 2023 05:06 )  PTT:59.7 sec                Thyroid Stimulating Hormone, Serum: 0.71 uIU/mL (02-21 @ 16:38)          Consultant(s) Notes Reviewed:      Care Discussed with Consultants/Other Providers:

## 2023-02-27 NOTE — PROGRESS NOTE ADULT - ASSESSMENT
81 yo male      w/ pmhx of CKD, DM, HTN        presented to the ED BIBEMS 2/2 due to fall.      s/p   Right hip femoral fracture due to fall./  s/p  surg        TTE ,   EF 40 %. EKG notes inferior and septal M       DM/   HTN/  HL;D         on   lipitor and Imdur.           CKD  4,  ,  stable       AFib,  on  iv  heparin     pt  with  mlple pauses./   [place] and pause  of  3/8  sec    tele    on  amio, norvasc,  lipitor, , hydralazine    may  need   ppm/  sons   are aware   and  will  get  back  to  team

## 2023-02-27 NOTE — PROGRESS NOTE ADULT - SUBJECTIVE AND OBJECTIVE BOX
CARDIOLOGY     PROGRESS  NOTE   ________________________________________________    CHIEF COMPLAINT:Patient is a 83y old  Male who presents with a chief complaint of Per chart, "83 yo male w/ pmhx of CKD, DM, HTN presented to the ED BIBEMS 2/2 due to fall. Right hip femoral fracture;  2/2 due to fall and NPO On 2/19/2023 for OR for hip surgery."  comfortable    	  REVIEW OF SYSTEMS:  CONSTITUTIONAL: No fever, weight loss, or fatigue  EYES: No eye pain, visual disturbances, or discharge  ENT:  No difficulty hearing, tinnitus, vertigo; No sinus or throat pain  NECK: No pain or stiffness  RESPIRATORY: No cough, wheezing, chills or hemoptysis; No Shortness of Breath  CARDIOVASCULAR: No chest pain, palpitations, passing out, dizziness, or leg swelling  GASTROINTESTINAL: No abdominal or epigastric pain. No nausea, vomiting, or hematemesis; No diarrhea or constipation. No melena or hematochezia.  GENITOURINARY: No dysuria, frequency, hematuria, or incontinence  NEUROLOGICAL: No headaches, memory loss, loss of strength, numbness, or tremors  SKIN: No itching, burning, rashes, or lesions   LYMPH Nodes: No enlarged glands  ENDOCRINE: No heat or cold intolerance; No hair loss  MUSCULOSKELETAL: No joint pain or swelling; No muscle, back, or extremity pain  PSYCHIATRIC: No depression, anxiety, mood swings, or difficulty sleeping  HEME/LYMPH: No easy bruising, or bleeding gums  ALLERGY AND IMMUNOLOGIC: No hives or eczema	    x[ ] All others negative	  [ ] Unable to obtain    PHYSICAL EXAM:  T(C): 37.1 (02-27-23 @ 08:44), Max: 37.1 (02-27-23 @ 08:44)  HR: 71 (02-27-23 @ 08:44) (71 - 93)  BP: 166/69 (02-27-23 @ 08:44) (134/60 - 170/82)  RR: 18 (02-27-23 @ 08:44) (17 - 18)  SpO2: 99% (02-27-23 @ 08:44) (93% - 99%)  Wt(kg): --  I&O's Summary    26 Feb 2023 07:01  -  27 Feb 2023 07:00  --------------------------------------------------------  IN: 1336.5 mL / OUT: 400 mL / NET: 936.5 mL        Appearance: Normal	  HEENT:   Normal oral mucosa, PERRL, EOMI	  Lymphatic: No lymphadenopathy  Cardiovascular: Normal S1 S2, No JVD, +murmurs, No edema  Respiratory: rhonchi	  Psychiatry: A & O x 3, Mood & affect appropriate  Gastrointestinal:  Soft, Non-tender, + BS	  Skin: No rashes, No ecchymoses, No cyanosis	  Neurologic: Non-focal  Extremities: Normal range of motion, No clubbing, cyanosis or edema  Vascular: Peripheral pulses palpable 2+ bilaterally    MEDICATIONS  (STANDING):  acetaminophen     Tablet .. 975 milliGRAM(s) Oral every 8 hours  aMIOdarone    Tablet 200 milliGRAM(s) Oral daily  amLODIPine   Tablet 2.5 milliGRAM(s) Oral daily  atorvastatin 40 milliGRAM(s) Oral at bedtime  brimonidine 0.2% Ophthalmic Solution 1 Drop(s) Both EYES two times a day  chlorhexidine 2% Cloths 1 Application(s) Topical daily  dextrose 5%. 1000 milliLiter(s) (100 mL/Hr) IV Continuous <Continuous>  dextrose 5%. 1000 milliLiter(s) (50 mL/Hr) IV Continuous <Continuous>  dextrose 50% Injectable 25 Gram(s) IV Push once  dextrose 50% Injectable 12.5 Gram(s) IV Push once  dextrose 50% Injectable 25 Gram(s) IV Push once  dorzolamide 2%/timolol 0.5% Ophthalmic Solution 1 Drop(s) Both EYES two times a day  glucagon  Injectable 1 milliGRAM(s) IntraMuscular once  heparin  Infusion. 1050 Unit(s)/Hr (10.5 mL/Hr) IV Continuous <Continuous>  hydrALAZINE 50 milliGRAM(s) Oral three times a day  insulin lispro (ADMELOG) corrective regimen sliding scale   SubCutaneous three times a day before meals  insulin lispro (ADMELOG) corrective regimen sliding scale   SubCutaneous at bedtime  isosorbide   mononitrate ER Tablet (IMDUR) 60 milliGRAM(s) Oral daily  multivitamin 1 Tablet(s) Oral daily  pantoprazole    Tablet 40 milliGRAM(s) Oral before breakfast  polyethylene glycol 3350 17 Gram(s) Oral at bedtime  prednisoLONE acetate 1% Suspension 1 Drop(s) Left EYE three times a day  senna 2 Tablet(s) Oral at bedtime  sodium bicarbonate 1300 milliGRAM(s) Oral two times a day      TELEMETRY: 	    ECG:  	  RADIOLOGY:  OTHER: 	  	  LABS:	 	    CARDIAC MARKERS:                                8.6    9.99  )-----------( 237      ( 26 Feb 2023 08:32 )             27.9     02-27    140  |  106  |  66<H>  ----------------------------<  216<H>  4.9   |  19<L>  |  4.60<H>    Ca    8.9      27 Feb 2023 05:06      proBNP: Serum Pro-Brain Natriuretic Peptide: 6136 pg/mL (02-17 @ 02:45)    Lipid Profile:   HgA1c:   TSH: Thyroid Stimulating Hormone, Serum: 0.71 uIU/mL (02-21 @ 16:38)  Thyroid Stimulating Hormone, Serum: 1.55 uIU/mL (02-21 @ 07:17)    PTT - ( 27 Feb 2023 05:06 )  PTT:59.7 sec      Assessment and plan  ---------------------------  82M PMHx of DM presenting with right hip pain s/p mechanical fall today. Describes slipping in the kitchen and falling on his right side w/ right sided hip pain mild. Denies any LOC. Denies any antiplatelet or AC.   pt with hx of htn, ckd, no known hx of cad, who is very active with no cardiac complain ,s/p tripped and fall, pt was admitted to [facility] had mild elevation of trop with no sig jump , no cpk and MB was drawn. pt with no chest pain.  had a long discussion with son health care proxy the results of blood test, echo discussed with length with him the possibility of sig CAD and risk of MI and death  in addition to other medical co morbidity  renal disease explained to the health care proxy son  and son claims father  can not live with hip fx and not  being able to walk and understands the risk.  continue beta blocker and hydralazine , keep hgb at least >8  increase beta blocker to 25 mg po bid  PAF/ flutter in flutter last night  start on amio try to keep in nsr  AC if no contraindication from ortho  asa daily  lipitor  dc ivf  renal may need HD in near future  tele nsr in 60 to 70 first degree av block  decrease bp, decrease hydralazine dose, will adjust bp meds  hr is better start on lopressor 12.5 mg bid , change amio to 200 mg daily  discussed with pt and son possibilty of ppm  physical therapy  pt needs to fu closely reg chf/ cardiomyopathy  pt with 6 second pause yesterday with ventricular stand still  pt with a.fib /RVR//bradycardia and hx of syncope and also needs beta blocker not only to control PAF as well cad and chf pt has sig conduction disease  I highly recommend PPM  discussed with pt and son about ppm they like to think about it  pt is at risk of recurrent syncope specially on ac worrisome discussed with son and family and will let me know  will review tele

## 2023-02-27 NOTE — PROGRESS NOTE ADULT - NS ATTEND AMEND GEN_ALL_CORE FT
Seen, examined with, formulated plan with and  agree with above as scribed by NP Fei [Magda]     lungs decrease bs no rales   heart RRR   ext no edema   no asterixis     RASHAAD on CKD  acidosis     cr is reaching a plateau   does not appear uremic at present  sodium bicarbonate 1300 mg bid

## 2023-02-27 NOTE — PROGRESS NOTE ADULT - SUBJECTIVE AND OBJECTIVE BOX
INTERVAL HPI/OVERNIGHT EVENTS:  Pt seen and examined at bedside.     Allergies/Intolerance: No Known Allergies      MEDICATIONS  (STANDING):  acetaminophen     Tablet .. 975 milliGRAM(s) Oral every 8 hours  aMIOdarone    Tablet 200 milliGRAM(s) Oral daily  amLODIPine   Tablet 2.5 milliGRAM(s) Oral daily  atorvastatin 40 milliGRAM(s) Oral at bedtime  brimonidine 0.2% Ophthalmic Solution 1 Drop(s) Both EYES two times a day  chlorhexidine 2% Cloths 1 Application(s) Topical daily  dextrose 5%. 1000 milliLiter(s) (100 mL/Hr) IV Continuous <Continuous>  dextrose 5%. 1000 milliLiter(s) (50 mL/Hr) IV Continuous <Continuous>  dextrose 50% Injectable 25 Gram(s) IV Push once  dextrose 50% Injectable 12.5 Gram(s) IV Push once  dextrose 50% Injectable 25 Gram(s) IV Push once  dorzolamide 2%/timolol 0.5% Ophthalmic Solution 1 Drop(s) Both EYES two times a day  glucagon  Injectable 1 milliGRAM(s) IntraMuscular once  heparin  Infusion. 1050 Unit(s)/Hr (10.5 mL/Hr) IV Continuous <Continuous>  hydrALAZINE 50 milliGRAM(s) Oral three times a day  insulin lispro (ADMELOG) corrective regimen sliding scale   SubCutaneous three times a day before meals  insulin lispro (ADMELOG) corrective regimen sliding scale   SubCutaneous at bedtime  isosorbide   mononitrate ER Tablet (IMDUR) 60 milliGRAM(s) Oral daily  multivitamin 1 Tablet(s) Oral daily  pantoprazole    Tablet 40 milliGRAM(s) Oral before breakfast  polyethylene glycol 3350 17 Gram(s) Oral at bedtime  prednisoLONE acetate 1% Suspension 1 Drop(s) Left EYE three times a day  senna 2 Tablet(s) Oral at bedtime  sodium bicarbonate 1300 milliGRAM(s) Oral two times a day    MEDICATIONS  (PRN):  dextrose Oral Gel 15 Gram(s) Oral once PRN Blood Glucose LESS THAN 70 milliGRAM(s)/deciliter  heparin   Injectable 4700 Unit(s) IV Push every 6 hours PRN For aPTT less than 40  traMADol 25 milliGRAM(s) Oral every 6 hours PRN Mild Pain (1 - 3)        ROS: all systems reviewed and wnl      PHYSICAL EXAMINATION:  Vital Signs Last 24 Hrs  T(C): 37.1 (27 Feb 2023 08:44), Max: 37.1 (27 Feb 2023 08:44)  T(F): 98.7 (27 Feb 2023 08:44), Max: 98.7 (27 Feb 2023 08:44)  HR: 71 (27 Feb 2023 08:44) (71 - 93)  BP: 166/69 (27 Feb 2023 08:44) (134/60 - 170/82)  BP(mean): --  RR: 18 (27 Feb 2023 08:44) (17 - 18)  SpO2: 99% (27 Feb 2023 08:44) (93% - 99%)    Parameters below as of 27 Feb 2023 08:44  Patient On (Oxygen Delivery Method): room air      CAPILLARY BLOOD GLUCOSE      POCT Blood Glucose.: 174 mg/dL (27 Feb 2023 12:09)  POCT Blood Glucose.: 238 mg/dL (27 Feb 2023 08:15)  POCT Blood Glucose.: 210 mg/dL (26 Feb 2023 21:45)  POCT Blood Glucose.: 217 mg/dL (26 Feb 2023 17:52)      02-26 @ 07:01  -  02-27 @ 07:00  --------------------------------------------------------  IN: 1336.5 mL / OUT: 400 mL / NET: 936.5 mL        GENERAL: stable, in chair, comfortable, no CP, fevers or SOB. No peraza.   NECK: supple, No JVD  CHEST/LUNG: clear to auscultation bilaterally; no rales, rhonchi, or wheezing b/l  HEART: normal S1, S2  ABDOMEN: BS+, soft, ND, NT   EXTREMITIES:  pulses palpable; no clubbing, cyanosis, or edema b/l LEs      LABS:                        8.6    9.99  )-----------( 237      ( 26 Feb 2023 08:32 )             27.9     02-27    140  |  106  |  66<H>  ----------------------------<  216<H>  4.9   |  19<L>  |  4.60<H>    Ca    8.9      27 Feb 2023 05:06      PTT - ( 27 Feb 2023 12:54 )  PTT:73.8 sec

## 2023-02-27 NOTE — PROGRESS NOTE ADULT - ASSESSMENT
83 yo male w/ pmhx of CKD, DM, HTN presented to the ED [facility] 2/2 due to fall.        Plan: Right hip femoral fracture due to fall. Had right hip surgery. Stable in bed.  CT chest no pneumonia.  Stop all ABX.   WBAT.     COVID- 19 :  No evidence of Hypoxia, no treatment needed.     Elevated Troponin; continue to trend, unable to assess if any chest pain, patient is currently confused. TTE decreased EF 40 %. EKG notes inferior and septal MI. CHF, euvolemic.      DM: stable, hold all meds. SSC.   Acceptable today around 195 off Lantus.      Essential HTN: resume home medication lopressor, lipitor and Imdur.  Hydralazine, and Norvasc.    Only on Amiodarone now.         Renal: Creatinine 4.60. Renal consult was called, Dr. Cook will see.  Creatinine one year ago was 4.10. Renal has cleared for discharge.        Stable on tele, some bradycardia.  PT eval.  Changed to PO bicarb.     IV Heparin for now. Tele shows second degree HB. Patient not willing to have PPM here, wants to speak to outside Cardiologist. EKG in AM.        Family members states patient does not want a PPM.

## 2023-02-28 ENCOUNTER — TRANSCRIPTION ENCOUNTER (OUTPATIENT)
Age: 83
End: 2023-02-28

## 2023-02-28 LAB
ANION GAP SERPL CALC-SCNC: 14 MMOL/L — SIGNIFICANT CHANGE UP (ref 5–17)
APTT BLD: 58.8 SEC — HIGH (ref 27.5–35.5)
APTT BLD: 64 SEC — HIGH (ref 27.5–35.5)
BUN SERPL-MCNC: 60 MG/DL — HIGH (ref 7–23)
CALCIUM SERPL-MCNC: 8.7 MG/DL — SIGNIFICANT CHANGE UP (ref 8.4–10.5)
CHLORIDE SERPL-SCNC: 108 MMOL/L — SIGNIFICANT CHANGE UP (ref 96–108)
CO2 SERPL-SCNC: 20 MMOL/L — LOW (ref 22–31)
CREAT SERPL-MCNC: 4.47 MG/DL — HIGH (ref 0.5–1.3)
EGFR: 12 ML/MIN/1.73M2 — LOW
GLUCOSE BLDC GLUCOMTR-MCNC: 190 MG/DL — HIGH (ref 70–99)
GLUCOSE BLDC GLUCOMTR-MCNC: 210 MG/DL — HIGH (ref 70–99)
GLUCOSE BLDC GLUCOMTR-MCNC: 254 MG/DL — HIGH (ref 70–99)
GLUCOSE BLDC GLUCOMTR-MCNC: 257 MG/DL — HIGH (ref 70–99)
GLUCOSE SERPL-MCNC: 186 MG/DL — HIGH (ref 70–99)
HCT VFR BLD CALC: 28.1 % — LOW (ref 39–50)
HGB BLD-MCNC: 8.2 G/DL — LOW (ref 13–17)
MCHC RBC-ENTMCNC: 27.8 PG — SIGNIFICANT CHANGE UP (ref 27–34)
MCHC RBC-ENTMCNC: 29.2 GM/DL — LOW (ref 32–36)
MCV RBC AUTO: 95.3 FL — SIGNIFICANT CHANGE UP (ref 80–100)
NRBC # BLD: 0 /100 WBCS — SIGNIFICANT CHANGE UP (ref 0–0)
PLATELET # BLD AUTO: 255 K/UL — SIGNIFICANT CHANGE UP (ref 150–400)
POTASSIUM SERPL-MCNC: 4.8 MMOL/L — SIGNIFICANT CHANGE UP (ref 3.5–5.3)
POTASSIUM SERPL-SCNC: 4.8 MMOL/L — SIGNIFICANT CHANGE UP (ref 3.5–5.3)
RBC # BLD: 2.95 M/UL — LOW (ref 4.2–5.8)
RBC # FLD: 15.1 % — HIGH (ref 10.3–14.5)
SARS-COV-2 RNA SPEC QL NAA+PROBE: SIGNIFICANT CHANGE UP
SODIUM SERPL-SCNC: 142 MMOL/L — SIGNIFICANT CHANGE UP (ref 135–145)
WBC # BLD: 10.29 K/UL — SIGNIFICANT CHANGE UP (ref 3.8–10.5)
WBC # FLD AUTO: 10.29 K/UL — SIGNIFICANT CHANGE UP (ref 3.8–10.5)

## 2023-02-28 RX ORDER — PANTOPRAZOLE SODIUM 20 MG/1
1 TABLET, DELAYED RELEASE ORAL
Qty: 0 | Refills: 0 | DISCHARGE
Start: 2023-02-28

## 2023-02-28 RX ORDER — APIXABAN 2.5 MG/1
1 TABLET, FILM COATED ORAL
Qty: 0 | Refills: 0 | DISCHARGE
Start: 2023-02-28

## 2023-02-28 RX ORDER — ISOSORBIDE MONONITRATE 60 MG/1
1 TABLET, EXTENDED RELEASE ORAL
Qty: 0 | Refills: 0 | DISCHARGE
Start: 2023-02-28

## 2023-02-28 RX ORDER — HYDRALAZINE HCL 50 MG
75 TABLET ORAL THREE TIMES A DAY
Refills: 0 | Status: DISCONTINUED | OUTPATIENT
Start: 2023-02-28 | End: 2023-03-01

## 2023-02-28 RX ORDER — LOSARTAN POTASSIUM 100 MG/1
1 TABLET, FILM COATED ORAL
Qty: 0 | Refills: 0 | DISCHARGE

## 2023-02-28 RX ORDER — HYDRALAZINE HCL 50 MG
0 TABLET ORAL
Qty: 0 | Refills: 2 | DISCHARGE

## 2023-02-28 RX ORDER — CARVEDILOL PHOSPHATE 80 MG/1
1 CAPSULE, EXTENDED RELEASE ORAL
Qty: 0 | Refills: 0 | DISCHARGE

## 2023-02-28 RX ORDER — APIXABAN 2.5 MG/1
2.5 TABLET, FILM COATED ORAL EVERY 12 HOURS
Refills: 0 | Status: DISCONTINUED | OUTPATIENT
Start: 2023-02-28 | End: 2023-03-01

## 2023-02-28 RX ORDER — SODIUM BICARBONATE 1 MEQ/ML
2 SYRINGE (ML) INTRAVENOUS
Qty: 0 | Refills: 0 | DISCHARGE
Start: 2023-02-28

## 2023-02-28 RX ORDER — HYDRALAZINE HCL 50 MG
3 TABLET ORAL
Qty: 0 | Refills: 0 | DISCHARGE
Start: 2023-02-28

## 2023-02-28 RX ORDER — AMIODARONE HYDROCHLORIDE 400 MG/1
1 TABLET ORAL
Qty: 0 | Refills: 0 | DISCHARGE
Start: 2023-02-28

## 2023-02-28 RX ADMIN — AMIODARONE HYDROCHLORIDE 200 MILLIGRAM(S): 400 TABLET ORAL at 05:34

## 2023-02-28 RX ADMIN — APIXABAN 2.5 MILLIGRAM(S): 2.5 TABLET, FILM COATED ORAL at 23:18

## 2023-02-28 RX ADMIN — Medication 1 DROP(S): at 05:36

## 2023-02-28 RX ADMIN — Medication 975 MILLIGRAM(S): at 13:45

## 2023-02-28 RX ADMIN — DORZOLAMIDE HYDROCHLORIDE TIMOLOL MALEATE 1 DROP(S): 20; 5 SOLUTION/ DROPS OPHTHALMIC at 19:47

## 2023-02-28 RX ADMIN — Medication 975 MILLIGRAM(S): at 23:18

## 2023-02-28 RX ADMIN — CHLORHEXIDINE GLUCONATE 1 APPLICATION(S): 213 SOLUTION TOPICAL at 13:44

## 2023-02-28 RX ADMIN — DORZOLAMIDE HYDROCHLORIDE TIMOLOL MALEATE 1 DROP(S): 20; 5 SOLUTION/ DROPS OPHTHALMIC at 05:35

## 2023-02-28 RX ADMIN — Medication 1 DROP(S): at 23:08

## 2023-02-28 RX ADMIN — TRAMADOL HYDROCHLORIDE 25 MILLIGRAM(S): 50 TABLET ORAL at 19:53

## 2023-02-28 RX ADMIN — Medication 1: at 09:48

## 2023-02-28 RX ADMIN — POLYETHYLENE GLYCOL 3350 17 GRAM(S): 17 POWDER, FOR SOLUTION ORAL at 23:19

## 2023-02-28 RX ADMIN — BRIMONIDINE TARTRATE 1 DROP(S): 2 SOLUTION/ DROPS OPHTHALMIC at 19:47

## 2023-02-28 RX ADMIN — Medication 975 MILLIGRAM(S): at 14:14

## 2023-02-28 RX ADMIN — BRIMONIDINE TARTRATE 1 DROP(S): 2 SOLUTION/ DROPS OPHTHALMIC at 05:36

## 2023-02-28 RX ADMIN — AMLODIPINE BESYLATE 2.5 MILLIGRAM(S): 2.5 TABLET ORAL at 05:35

## 2023-02-28 RX ADMIN — ISOSORBIDE MONONITRATE 60 MILLIGRAM(S): 60 TABLET, EXTENDED RELEASE ORAL at 13:44

## 2023-02-28 RX ADMIN — PANTOPRAZOLE SODIUM 40 MILLIGRAM(S): 20 TABLET, DELAYED RELEASE ORAL at 05:34

## 2023-02-28 RX ADMIN — SENNA PLUS 2 TABLET(S): 8.6 TABLET ORAL at 23:18

## 2023-02-28 RX ADMIN — Medication 75 MILLIGRAM(S): at 23:18

## 2023-02-28 RX ADMIN — Medication 2: at 14:21

## 2023-02-28 RX ADMIN — Medication 1 TABLET(S): at 13:45

## 2023-02-28 RX ADMIN — HEPARIN SODIUM 1050 UNIT(S)/HR: 5000 INJECTION INTRAVENOUS; SUBCUTANEOUS at 03:57

## 2023-02-28 RX ADMIN — Medication 975 MILLIGRAM(S): at 05:35

## 2023-02-28 RX ADMIN — HEPARIN SODIUM 1050 UNIT(S)/HR: 5000 INJECTION INTRAVENOUS; SUBCUTANEOUS at 02:38

## 2023-02-28 RX ADMIN — Medication 75 MILLIGRAM(S): at 13:44

## 2023-02-28 RX ADMIN — Medication 1300 MILLIGRAM(S): at 05:35

## 2023-02-28 RX ADMIN — Medication 975 MILLIGRAM(S): at 06:35

## 2023-02-28 RX ADMIN — Medication 50 MILLIGRAM(S): at 05:34

## 2023-02-28 RX ADMIN — ATORVASTATIN CALCIUM 40 MILLIGRAM(S): 80 TABLET, FILM COATED ORAL at 23:18

## 2023-02-28 NOTE — PROGRESS NOTE ADULT - SUBJECTIVE AND OBJECTIVE BOX
INTERVAL HPI/OVERNIGHT EVENTS:  Pt seen and examined at bedside.     Allergies/Intolerance: No Known Allergies      MEDICATIONS  (STANDING):  acetaminophen     Tablet .. 975 milliGRAM(s) Oral every 8 hours  aMIOdarone    Tablet 200 milliGRAM(s) Oral daily  amLODIPine   Tablet 2.5 milliGRAM(s) Oral daily  atorvastatin 40 milliGRAM(s) Oral at bedtime  brimonidine 0.2% Ophthalmic Solution 1 Drop(s) Both EYES two times a day  chlorhexidine 2% Cloths 1 Application(s) Topical daily  dextrose 5%. 1000 milliLiter(s) (100 mL/Hr) IV Continuous <Continuous>  dextrose 5%. 1000 milliLiter(s) (50 mL/Hr) IV Continuous <Continuous>  dextrose 50% Injectable 25 Gram(s) IV Push once  dextrose 50% Injectable 12.5 Gram(s) IV Push once  dextrose 50% Injectable 25 Gram(s) IV Push once  dorzolamide 2%/timolol 0.5% Ophthalmic Solution 1 Drop(s) Both EYES two times a day  glucagon  Injectable 1 milliGRAM(s) IntraMuscular once  heparin  Infusion. 1050 Unit(s)/Hr (10.5 mL/Hr) IV Continuous <Continuous>  hydrALAZINE 50 milliGRAM(s) Oral three times a day  insulin lispro (ADMELOG) corrective regimen sliding scale   SubCutaneous three times a day before meals  insulin lispro (ADMELOG) corrective regimen sliding scale   SubCutaneous at bedtime  isosorbide   mononitrate ER Tablet (IMDUR) 60 milliGRAM(s) Oral daily  multivitamin 1 Tablet(s) Oral daily  pantoprazole    Tablet 40 milliGRAM(s) Oral before breakfast  polyethylene glycol 3350 17 Gram(s) Oral at bedtime  prednisoLONE acetate 1% Suspension 1 Drop(s) Left EYE three times a day  senna 2 Tablet(s) Oral at bedtime  sodium bicarbonate 1300 milliGRAM(s) Oral two times a day    MEDICATIONS  (PRN):  dextrose Oral Gel 15 Gram(s) Oral once PRN Blood Glucose LESS THAN 70 milliGRAM(s)/deciliter  heparin   Injectable 4700 Unit(s) IV Push every 6 hours PRN For aPTT less than 40  traMADol 25 milliGRAM(s) Oral every 6 hours PRN Mild Pain (1 - 3)        ROS: all systems reviewed and wnl      PHYSICAL EXAMINATION:  Vital Signs Last 24 Hrs  T(C): 36.9 (28 Feb 2023 05:30), Max: 37.1 (27 Feb 2023 08:44)  T(F): 98.4 (28 Feb 2023 05:30), Max: 98.7 (27 Feb 2023 08:44)  HR: 67 (28 Feb 2023 05:30) (66 - 78)  BP: 176/71 (28 Feb 2023 05:30) (108/62 - 176/71)  BP(mean): --  RR: 18 (28 Feb 2023 05:30) (18 - 18)  SpO2: 99% (28 Feb 2023 05:30) (96% - 99%)    Parameters below as of 28 Feb 2023 05:30  Patient On (Oxygen Delivery Method): room air      CAPILLARY BLOOD GLUCOSE      POCT Blood Glucose.: 197 mg/dL (27 Feb 2023 22:24)  POCT Blood Glucose.: 181 mg/dL (27 Feb 2023 17:18)  POCT Blood Glucose.: 174 mg/dL (27 Feb 2023 12:09)  POCT Blood Glucose.: 238 mg/dL (27 Feb 2023 08:15)      02-27 @ 07:01  -  02-28 @ 07:00  --------------------------------------------------------  IN: 1467 mL / OUT: 1000 mL / NET: 467 mL        GENERAL: in chair, comfortable, no CP, fevers, alert, follows commands.    NECK: supple, No JVD  CHEST/LUNG: clear to auscultation bilaterally; no rales, rhonchi, or wheezing b/l  HEART: normal S1, S2  ABDOMEN: BS+, soft, ND, NT   EXTREMITIES:  pulses palpable; no clubbing, cyanosis, or edema b/l LEs    LABS:                        8.2    10.29 )-----------( 255      ( 28 Feb 2023 03:22 )             28.1     02-28    142  |  108  |  60<H>  ----------------------------<  186<H>  4.8   |  20<L>  |  4.47<H>    Ca    8.7      28 Feb 2023 03:22      PTT - ( 28 Feb 2023 03:22 )  PTT:58.8 sec

## 2023-02-28 NOTE — PROGRESS NOTE ADULT - ASSESSMENT
81 yo male w/ pmhx of CKD, DM, HTN presented to the ED [facility] 2/2 due to fall.        Plan: Right hip femoral fracture due to fall. Had right hip surgery. Stable in bed.  CT chest no pneumonia.  Stop all ABX.   WBAT.     COVID- 19 :  No evidence of Hypoxia, no treatment needed.     Elevated Troponin; continue to trend, unable to assess if any chest pain, patient is currently confused. TTE decreased EF 40 %. EKG notes inferior and septal MI. CHF, euvolemic.      DM: stable, hold all meds. SSC.   Acceptable today around 195 off Lantus.      Essential HTN: resume home medication lopressor, lipitor and Imdur.  Hydralazine, and Norvasc.    Only on Amiodarone now.         Renal: Creatinine 4.60. Renal consult was called, Dr. Cook will see.  Creatinine one year ago was 4.10. Renal has cleared for discharge.        Stable on tele, some bradycardia.  PT eval.  Changed to PO bicarb.     IV Heparin for now. Tele shows second degree HB. Patient not willing to have PPM here, wants to speak to outside Cardiologist. EKG in AM.        Family members states patient does not want a PPM.    83 yo male w/ pmhx of CKD, DM, HTN presented to the ED [facility] 2/2 due to fall.        Plan: Right hip femoral fracture due to fall. Had right hip surgery. Stable in bed.  CT chest no pneumonia.  Stop all ABX.   WBAT.     COVID- 19 :  No evidence of Hypoxia, no treatment needed.     Elevated Troponin; continue to trend, unable to assess if any chest pain, patient is currently confused. TTE decreased EF 40 %. EKG notes inferior and septal MI. CHF, euvolemic.      DM: stable, hold all meds. SSC.   Acceptable today around 195 off Lantus.      Essential HTN: resume home medication lopressor, lipitor and Imdur.  Hydralazine, and Norvasc.    Only on Amiodarone now.         Renal: Creatinine 4.60. Renal consult was called, Dr. Cook will see.  Creatinine one year ago was 4.10. Renal has cleared for discharge.        Stable on tele, some bradycardia.  PT eval.  Changed to PO bicarb.     IV Heparin for now. Tele shows second degree HB but rate is 60-80. Patient not willing to have PPM here, wants to speak to outside Cardiologist. EKG in AM.        Family members states patient does not want a PPM.

## 2023-02-28 NOTE — PROVIDER CONTACT NOTE (OTHER) - RECOMMENDATIONS
ELIZABETH Mascorro notified and aware. As per ELIZABETH Mascorro, draw stat aPTT now to assess accuracy of prior aPTT value.
ELIZABETH Espinoza notified and aware
to monitor patient
Provider states that patient's has history of AFib, continue to monitor.
Ativan? B/l wrist restraints.
ELIZABETH Espinoza notified and aware
Make provider aware.
ELIZABETH Mascorro notified and aware
Provider to see patient.

## 2023-02-28 NOTE — PROGRESS NOTE ADULT - ASSESSMENT
81 yo male w/ pmhx of CKD, DM, HTN presented to the ED BIBEMS 2/2 due to fall. Right hip femoral fracture;  2/2 due to fall and NPO On 2/19/2023 for OR for hip surgery. noticed with abn creatinine 4.9 now at 4.4 pt and son state pt does not have ckd however, saw a nephrologist in [facility] last year once only. was given a medication does not recall what med was given.      1- RASHAAD on ckd suspected  2- HTN   3- DM   4- hyperlipidemia   5- proteinuria  6- pneumonia  7- acidosis       as per Dr. Elliott d/w primary renal outpt team discussion creatinine last year of 4 g/dL.   s/p hip surgery   cr steady and does not appear uremic at present   hold losartan for now  continue sodium bicarbonate 1300 mg BID  trend bicarb  cont with lipitor   hydralazine 75 mg TID  cards follow up for arrhythmias   holding GELA due to recent covid infection and likely hypercoagulable state   strict I/O  PT  trend creatinine and electrolytes

## 2023-02-28 NOTE — PROGRESS NOTE ADULT - SUBJECTIVE AND OBJECTIVE BOX
CARDIOLOGY     PROGRESS  NOTE   ________________________________________________    CHIEF COMPLAINT:Patient is a 83y old  Male who presents with a chief complaint of Per chart, "81 yo male w/ pmhx of CKD, DM, HTN presented to the ED BIBEMS 2/2 due to fall. Right hip femoral fracture;  2/2 due to fall and NPO On 2/19/2023 for OR for hip surgery."  no complain    	  REVIEW OF SYSTEMS:  CONSTITUTIONAL: No fever, weight loss, or fatigue  EYES: No eye pain, visual disturbances, or discharge  ENT:  No difficulty hearing, tinnitus, vertigo; No sinus or throat pain  NECK: No pain or stiffness  RESPIRATORY: No cough, wheezing, chills or hemoptysis; No Shortness of Breath  CARDIOVASCULAR: No chest pain, palpitations, passing out, dizziness, or leg swelling  GASTROINTESTINAL: No abdominal or epigastric pain. No nausea, vomiting, or hematemesis; No diarrhea or constipation. No melena or hematochezia.  GENITOURINARY: No dysuria, frequency, hematuria, or incontinence  NEUROLOGICAL: No headaches, memory loss, loss of strength, numbness, or tremors  SKIN: No itching, burning, rashes, or lesions   LYMPH Nodes: No enlarged glands  ENDOCRINE: No heat or cold intolerance; No hair loss  MUSCULOSKELETAL: No joint pain or swelling; No muscle, back, or extremity pain  PSYCHIATRIC: No depression, anxiety, mood swings, or difficulty sleeping  HEME/LYMPH: No easy bruising, or bleeding gums  ALLERGY AND IMMUNOLOGIC: No hives or eczema	    [x ] All others negative	  [ ] Unable to obtain    PHYSICAL EXAM:  T(C): 36.9 (02-28-23 @ 05:30), Max: 36.9 (02-28-23 @ 05:30)  HR: 67 (02-28-23 @ 05:30) (66 - 78)  BP: 176/71 (02-28-23 @ 05:30) (108/62 - 176/71)  RR: 18 (02-28-23 @ 05:30) (18 - 18)  SpO2: 99% (02-28-23 @ 05:30) (96% - 99%)  Wt(kg): --  I&O's Summary    27 Feb 2023 07:01  -  28 Feb 2023 07:00  --------------------------------------------------------  IN: 1467 mL / OUT: 1000 mL / NET: 467 mL        Appearance: Normal	  HEENT:   Normal oral mucosa, PERRL, EOMI	  Lymphatic: No lymphadenopathy  Cardiovascular: Normal S1 S2, No JVD, + murmurs, No edema  Respiratory: Lungs clear to auscultation	  Psychiatry: mild dementia  Gastrointestinal:  Soft, Non-tender, + BS	  Skin: No rashes, No ecchymoses, No cyanosis	  Neurologic: Non-focal  Extremities: Normal range of motion, No clubbing, cyanosis or edema  Vascular: Peripheral pulses palpable 2+ bilaterally    MEDICATIONS  (STANDING):  acetaminophen     Tablet .. 975 milliGRAM(s) Oral every 8 hours  aMIOdarone    Tablet 200 milliGRAM(s) Oral daily  atorvastatin 40 milliGRAM(s) Oral at bedtime  brimonidine 0.2% Ophthalmic Solution 1 Drop(s) Both EYES two times a day  chlorhexidine 2% Cloths 1 Application(s) Topical daily  dextrose 5%. 1000 milliLiter(s) (50 mL/Hr) IV Continuous <Continuous>  dextrose 5%. 1000 milliLiter(s) (100 mL/Hr) IV Continuous <Continuous>  dextrose 50% Injectable 25 Gram(s) IV Push once  dextrose 50% Injectable 12.5 Gram(s) IV Push once  dextrose 50% Injectable 25 Gram(s) IV Push once  dorzolamide 2%/timolol 0.5% Ophthalmic Solution 1 Drop(s) Both EYES two times a day  glucagon  Injectable 1 milliGRAM(s) IntraMuscular once  heparin  Infusion. 1050 Unit(s)/Hr (10.5 mL/Hr) IV Continuous <Continuous>  hydrALAZINE 75 milliGRAM(s) Oral three times a day  insulin lispro (ADMELOG) corrective regimen sliding scale   SubCutaneous three times a day before meals  insulin lispro (ADMELOG) corrective regimen sliding scale   SubCutaneous at bedtime  isosorbide   mononitrate ER Tablet (IMDUR) 60 milliGRAM(s) Oral daily  multivitamin 1 Tablet(s) Oral daily  pantoprazole    Tablet 40 milliGRAM(s) Oral before breakfast  polyethylene glycol 3350 17 Gram(s) Oral at bedtime  prednisoLONE acetate 1% Suspension 1 Drop(s) Left EYE three times a day  senna 2 Tablet(s) Oral at bedtime  sodium bicarbonate 1300 milliGRAM(s) Oral two times a day      TELEMETRY: 	    ECG:  	  RADIOLOGY:  OTHER: 	  	  LABS:	 	    CARDIAC MARKERS:                                8.2    10.29 )-----------( 255      ( 28 Feb 2023 03:22 )             28.1     02-28    142  |  108  |  60<H>  ----------------------------<  186<H>  4.8   |  20<L>  |  4.47<H>    Ca    8.7      28 Feb 2023 03:22      proBNP: Serum Pro-Brain Natriuretic Peptide: 6136 pg/mL (02-17 @ 02:45)    Lipid Profile:   HgA1c:   TSH: Thyroid Stimulating Hormone, Serum: 0.71 uIU/mL (02-21 @ 16:38)  Thyroid Stimulating Hormone, Serum: 1.55 uIU/mL (02-21 @ 07:17)    PTT - ( 28 Feb 2023 03:22 )  PTT:58.8 sec      Assessment and plan  ---------------------------  82M PMHx of DM presenting with right hip pain s/p mechanical fall today. Describes slipping in the kitchen and falling on his right side w/ right sided hip pain mild. Denies any LOC. Denies any antiplatelet or AC.   pt with hx of htn, ckd, no known hx of cad, who is very active with no cardiac complain ,s/p tripped and fall, pt was admitted to [facility] had mild elevation of trop with no sig jump , no cpk and MB was drawn. pt with no chest pain.  had a long discussion with son health care proxy the results of blood test, echo discussed with length with him the possibility of sig CAD and risk of MI and death  in addition to other medical co morbidity  renal disease explained to the health care proxy son  and son claims father  can not live with hip fx and not  being able to walk and understands the risk.  continue beta blocker and hydralazine , keep hgb at least >8  increase beta blocker to 25 mg po bid  PAF/ flutter in flutter last night  start on amio try to keep in nsr  AC if no contraindication from ortho  asa daily  discussed with pt , sons, pmd n cardiolgist  his pmd agreed with ppm, soin whoo is the health care proxy and pt refuses ppm, risk of fall/death explained the conversation witnessed by nursing and Dr Reyna  also i discusdsed with them regarding the risk of AC when there is a possibility of syncope he will decide if he wants long term AC

## 2023-02-28 NOTE — PROVIDER CONTACT NOTE (OTHER) - SITUATION
Pt is A&Ox1-2, pulled out remote tele leads, refuse for vital signs or fingerstick check. Attempt to pull out peripheral IV running heparin gtt.
Pt aPTT at 0201 result came back at >200. ELIZABETH Mascorro was notified and aware. As per ACS nomogram, heparin gtt was stopped for 1 hour. After one hour, heparin gtt was restarted at 8.5 mL/hr.
pt HR decreased to high 30s, low 40s on tele monitor
Bradycardic
Patient in Afib since 7:30 AM
Pt is A&O x2, refusing glucose check or vital signs. Pt hydralazine 50mg due soon, lunch tray at bedside; stated he will not eat. Pulling on remote tele leads.
Pt FS BG is 220, due for 2U insulin lispro. Pt refusing.
pt noted to have episode of sinus exit block per tele tech
pt with severe agitation

## 2023-02-28 NOTE — DISCHARGE NOTE NURSING/CASE MANAGEMENT/SOCIAL WORK - PATIENT PORTAL LINK FT
You can access the FollowMyHealth Patient Portal offered by [facility] by registering at the following website: [email or web address]. By joining [facility] FollowMyHealth portal, you will also be able to view your health information using other applications (apps) compatible with our system.

## 2023-02-28 NOTE — DISCHARGE NOTE NURSING/CASE MANAGEMENT/SOCIAL WORK - NSDCPEFALRISK_GEN_ALL_CORE
For information on Fall & Injury Prevention, visit: [email or web address] OR  [email or web address] OR  https://www.cdc.gov/steadi/patient.html

## 2023-02-28 NOTE — PROGRESS NOTE ADULT - SUBJECTIVE AND OBJECTIVE BOX
[facility]   [phone]  RENAL FOLLOW UP NOTE  --------------------------------------------------------------------------------  Chief Complaint:    24 hour events/subjective:    patient seen and examined earlier  denies sob    PAST HISTORY  --------------------------------------------------------------------------------  No significant changes to PMH, PSH, FHx, SHx, unless otherwise noted    ALLERGIES & MEDICATIONS  --------------------------------------------------------------------------------  Allergies    No Known Allergies    Intolerances      Standing Inpatient Medications  acetaminophen     Tablet .. 975 milliGRAM(s) Oral every 8 hours  aMIOdarone    Tablet 200 milliGRAM(s) Oral daily  apixaban 2.5 milliGRAM(s) Oral every 12 hours  atorvastatin 40 milliGRAM(s) Oral at bedtime  brimonidine 0.2% Ophthalmic Solution 1 Drop(s) Both EYES two times a day  chlorhexidine 2% Cloths 1 Application(s) Topical daily  dextrose 5%. 1000 milliLiter(s) IV Continuous <Continuous>  dextrose 5%. 1000 milliLiter(s) IV Continuous <Continuous>  dextrose 50% Injectable 25 Gram(s) IV Push once  dextrose 50% Injectable 12.5 Gram(s) IV Push once  dextrose 50% Injectable 25 Gram(s) IV Push once  dorzolamide 2%/timolol 0.5% Ophthalmic Solution 1 Drop(s) Both EYES two times a day  glucagon  Injectable 1 milliGRAM(s) IntraMuscular once  hydrALAZINE 75 milliGRAM(s) Oral three times a day  insulin lispro (ADMELOG) corrective regimen sliding scale   SubCutaneous three times a day before meals  insulin lispro (ADMELOG) corrective regimen sliding scale   SubCutaneous at bedtime  isosorbide   mononitrate ER Tablet (IMDUR) 60 milliGRAM(s) Oral daily  multivitamin 1 Tablet(s) Oral daily  pantoprazole    Tablet 40 milliGRAM(s) Oral before breakfast  polyethylene glycol 3350 17 Gram(s) Oral at bedtime  prednisoLONE acetate 1% Suspension 1 Drop(s) Left EYE three times a day  senna 2 Tablet(s) Oral at bedtime  sodium bicarbonate 1300 milliGRAM(s) Oral two times a day    PRN Inpatient Medications  dextrose Oral Gel 15 Gram(s) Oral once PRN  traMADol 25 milliGRAM(s) Oral every 6 hours PRN      REVIEW OF SYSTEMS  --------------------------------------------------------------------------------    Gen: denies fevers/chills,  CVS: denies chest pain/palpitations  Resp: denies SOB/Cough  GI: Denies N/V/Abd pain  : Denies dysuria/oliguria/hematuria    VITALS/PHYSICAL EXAM  --------------------------------------------------------------------------------  T(C): 36.4 (02-28-23 @ 17:10), Max: 37.2 (02-28-23 @ 13:04)  HR: 88 (02-28-23 @ 17:10) (66 - 88)  BP: 133/62 (02-28-23 @ 17:10) (108/62 - 176/71)  RR: 18 (02-28-23 @ 17:10) (18 - 18)  SpO2: 94% (02-28-23 @ 17:10) (94% - 99%)  Wt(kg): --        02-27-23 @ 07:01  -  02-28-23 @ 07:00  --------------------------------------------------------  IN: 1467 mL / OUT: 1000 mL / NET: 467 mL      Physical Exam:  	              Gen: responsive communicative  	Pulm: Decreased breath sounds b/l bases. no rales or ronchi or wheezing  	CV: RRR, S1/S2. no rub  	Abd: +BS, soft, nontender/nondistended  	: No bladder distention               Extremity: No cyanosis, R trace edema, no myoclonus/asterixis     LABS/STUDIES  --------------------------------------------------------------------------------              8.2    10.29 >-----------<  255      [02-28-23 @ 03:22]              28.1     142  |  108  |  60  ----------------------------<  186      [02-28-23 @ 03:22]  4.8   |  20  |  4.47        Ca     8.7     [02-28-23 @ 03:22]        PTT: 64.0       [02-28-23 @ 11:01]      Creatinine Trend:  SCr 4.47 [02-28 @ 03:22]  SCr 4.60 [02-27 @ 05:06]  SCr 4.59 [02-26 @ 08:32]  SCr 4.60 [02-26 @ 07:15]  SCr 4.68 [02-25 @ 07:14]              Urinalysis - [02-16-23 @ 22:15]      Color Yellow / Appearance Clear / SG 1.015 / pH 6.0      Gluc 1000 / Ketone Trace  / Bili Negative / Urobili Negative       Blood Small / Protein 500 / Leuk Est Negative / Nitrite Negative      RBC 0-2 / WBC Negative / Hyaline  / Gran  / Sq Epi  / Non Sq Epi Occasional / Bacteria Occasional      PTH -- (Ca 8.6)      [02-20-23 @ 05:39]   265  TSH 0.71      [02-21-23 @ 16:38]

## 2023-02-28 NOTE — PROVIDER CONTACT NOTE (OTHER) - DATE AND TIME:
23-Feb-2023 00:10
28-Feb-2023 10:45
20-Feb-2023 09:00
20-Feb-2023 12:50
20-Feb-2023 12:50
23-Feb-2023 02:50
22-Feb-2023 05:14
24-Feb-2023 08:30
22-Feb-2023 06:50

## 2023-02-28 NOTE — PROVIDER CONTACT NOTE (OTHER) - ASSESSMENT
Pt is A&Ox2 orient to self and place. Stated he does not want to do anything here and wants to leave.   "I only have one medical doctor, you cannot make me do what you want, you do what I want".
Pt is AAOx1-2 and confused. Pt attempted to be reoriented throughout the night. Pt has become increasingly agitated and attempting to pull telemetry leads off/ pull at IV site.
Pt is unable to reorient, agitated, pulling on leads and kicking with left foot. Son at bedside in agreement for restraints d/t heparin running on left PIV.
Pt denies CP or SOB
Pt is orient to self, wax and wane with place and situation. Pt stated he did not have surgery and "medical provider give me sugar tablets". Adamantly refusing insulin and stated "noone can force me".
Pt denies CP or SOB. HR decreased on tele monitor to high 30s/lows 40s for several seconds, but did not sustain.
Patient is A&O x3. Patient vitals are stable, denies chest pain or palpitations.
Pt is asymptomatic

## 2023-02-28 NOTE — PROVIDER CONTACT NOTE (OTHER) - REASON
COVID-19 Isolation Discontinuation
Edd HR 30
pt with severe agitation
Patient in [place]
Refuse insulin, A&Ox1-2
heparin gtt rate confirmation
pt HR decreased to high 30s, low 40s on tele monitor
Pt agitated pulling on tele leads and peripheral IV.
Pt refusing glucose check and vital signs check
pt noted to have episode of sinus exit block per tele tech

## 2023-02-28 NOTE — PROVIDER CONTACT NOTE (OTHER) - NAME OF MD/NP/PA/DO NOTIFIED:
Dontae Garcia MD
Dontae Garcia MD
ELIZABETH Panchal
ELIZABETH Panchal
Karina Tomlinson
ELIZABETH Alexander
ELIZABETH Alexander
Dontae Garcia MD
[place] [id number]

## 2023-03-01 ENCOUNTER — TRANSCRIPTION ENCOUNTER (OUTPATIENT)
Age: 83
End: 2023-03-01

## 2023-03-01 VITALS
OXYGEN SATURATION: 98 % | HEART RATE: 85 BPM | RESPIRATION RATE: 18 BRPM | SYSTOLIC BLOOD PRESSURE: 145 MMHG | TEMPERATURE: 97 F | DIASTOLIC BLOOD PRESSURE: 78 MMHG | WEIGHT: 170.86 LBS

## 2023-03-01 DIAGNOSIS — E11.9 TYPE 2 DIABETES MELLITUS WITHOUT COMPLICATIONS: ICD-10-CM

## 2023-03-01 DIAGNOSIS — R77.8 OTHER SPECIFIED ABNORMALITIES OF PLASMA PROTEINS: ICD-10-CM

## 2023-03-01 DIAGNOSIS — I44.1 ATRIOVENTRICULAR BLOCK, SECOND DEGREE: ICD-10-CM

## 2023-03-01 DIAGNOSIS — I10 ESSENTIAL (PRIMARY) HYPERTENSION: ICD-10-CM

## 2023-03-01 DIAGNOSIS — R79.89 OTHER SPECIFIED ABNORMAL FINDINGS OF BLOOD CHEMISTRY: ICD-10-CM

## 2023-03-01 LAB
GLUCOSE BLDC GLUCOMTR-MCNC: 249 MG/DL — HIGH (ref 70–99)
GLUCOSE BLDC GLUCOMTR-MCNC: 277 MG/DL — HIGH (ref 70–99)

## 2023-03-01 PROCEDURE — 85027 COMPLETE CBC AUTOMATED: CPT

## 2023-03-01 PROCEDURE — 87640 STAPH A DNA AMP PROBE: CPT

## 2023-03-01 PROCEDURE — 85018 HEMOGLOBIN: CPT

## 2023-03-01 PROCEDURE — 84484 ASSAY OF TROPONIN QUANT: CPT

## 2023-03-01 PROCEDURE — 97116 GAIT TRAINING THERAPY: CPT

## 2023-03-01 PROCEDURE — 97166 OT EVAL MOD COMPLEX 45 MIN: CPT

## 2023-03-01 PROCEDURE — 83735 ASSAY OF MAGNESIUM: CPT

## 2023-03-01 PROCEDURE — 97162 PT EVAL MOD COMPLEX 30 MIN: CPT

## 2023-03-01 PROCEDURE — 84443 ASSAY THYROID STIM HORMONE: CPT

## 2023-03-01 PROCEDURE — C1889: CPT

## 2023-03-01 PROCEDURE — 97535 SELF CARE MNGMENT TRAINING: CPT

## 2023-03-01 PROCEDURE — 82330 ASSAY OF CALCIUM: CPT

## 2023-03-01 PROCEDURE — 86850 RBC ANTIBODY SCREEN: CPT

## 2023-03-01 PROCEDURE — 36415 COLL VENOUS BLD VENIPUNCTURE: CPT

## 2023-03-01 PROCEDURE — 83970 ASSAY OF PARATHORMONE: CPT

## 2023-03-01 PROCEDURE — C1776: CPT

## 2023-03-01 PROCEDURE — U0005: CPT

## 2023-03-01 PROCEDURE — 82553 CREATINE MB FRACTION: CPT

## 2023-03-01 PROCEDURE — 85730 THROMBOPLASTIN TIME PARTIAL: CPT

## 2023-03-01 PROCEDURE — 84132 ASSAY OF SERUM POTASSIUM: CPT

## 2023-03-01 PROCEDURE — 93005 ELECTROCARDIOGRAM TRACING: CPT

## 2023-03-01 PROCEDURE — 80048 BASIC METABOLIC PNL TOTAL CA: CPT

## 2023-03-01 PROCEDURE — 82310 ASSAY OF CALCIUM: CPT

## 2023-03-01 PROCEDURE — 86901 BLOOD TYPING SEROLOGIC RH(D): CPT

## 2023-03-01 PROCEDURE — 84295 ASSAY OF SERUM SODIUM: CPT

## 2023-03-01 PROCEDURE — 85379 FIBRIN DEGRADATION QUANT: CPT

## 2023-03-01 PROCEDURE — 71250 CT THORAX DX C-: CPT

## 2023-03-01 PROCEDURE — 87641 MR-STAPH DNA AMP PROBE: CPT

## 2023-03-01 PROCEDURE — 82565 ASSAY OF CREATININE: CPT

## 2023-03-01 PROCEDURE — 86900 BLOOD TYPING SEROLOGIC ABO: CPT

## 2023-03-01 PROCEDURE — 82803 BLOOD GASES ANY COMBINATION: CPT

## 2023-03-01 PROCEDURE — 97110 THERAPEUTIC EXERCISES: CPT

## 2023-03-01 PROCEDURE — 85610 PROTHROMBIN TIME: CPT

## 2023-03-01 PROCEDURE — 82962 GLUCOSE BLOOD TEST: CPT

## 2023-03-01 PROCEDURE — U0003: CPT

## 2023-03-01 PROCEDURE — 82550 ASSAY OF CK (CPK): CPT

## 2023-03-01 PROCEDURE — 80053 COMPREHEN METABOLIC PANEL: CPT

## 2023-03-01 PROCEDURE — 97530 THERAPEUTIC ACTIVITIES: CPT

## 2023-03-01 PROCEDURE — 72170 X-RAY EXAM OF PELVIS: CPT

## 2023-03-01 PROCEDURE — 82435 ASSAY OF BLOOD CHLORIDE: CPT

## 2023-03-01 PROCEDURE — 93970 EXTREMITY STUDY: CPT

## 2023-03-01 PROCEDURE — 83605 ASSAY OF LACTIC ACID: CPT

## 2023-03-01 PROCEDURE — 71045 X-RAY EXAM CHEST 1 VIEW: CPT

## 2023-03-01 PROCEDURE — C1769: CPT

## 2023-03-01 PROCEDURE — 84100 ASSAY OF PHOSPHORUS: CPT

## 2023-03-01 PROCEDURE — 82947 ASSAY GLUCOSE BLOOD QUANT: CPT

## 2023-03-01 PROCEDURE — 85025 COMPLETE CBC W/AUTO DIFF WBC: CPT

## 2023-03-01 PROCEDURE — 85014 HEMATOCRIT: CPT

## 2023-03-01 PROCEDURE — C1713: CPT

## 2023-03-01 RX ORDER — ACETAMINOPHEN 500 MG
3 TABLET ORAL
Qty: 0 | Refills: 0 | DISCHARGE
Start: 2023-03-01

## 2023-03-01 RX ORDER — INSULIN LISPRO 100/ML
0 VIAL (ML) SUBCUTANEOUS
Qty: 0 | Refills: 0 | DISCHARGE
Start: 2023-03-01

## 2023-03-01 RX ORDER — PREDNISOLONE SODIUM PHOSPHATE 1 %
1 DROPS OPHTHALMIC (EYE)
Qty: 0 | Refills: 0 | DISCHARGE
Start: 2023-03-01

## 2023-03-01 RX ORDER — SIMVASTATIN 20 MG/1
0 TABLET, FILM COATED ORAL
Qty: 0 | Refills: 1 | DISCHARGE

## 2023-03-01 RX ORDER — TRAMADOL HYDROCHLORIDE 50 MG/1
0.5 TABLET ORAL
Qty: 0 | Refills: 0 | DISCHARGE
Start: 2023-03-01

## 2023-03-01 RX ORDER — POLYETHYLENE GLYCOL 3350 17 G/17G
17 POWDER, FOR SOLUTION ORAL
Qty: 0 | Refills: 0 | DISCHARGE
Start: 2023-03-01

## 2023-03-01 RX ORDER — ATORVASTATIN CALCIUM 80 MG/1
1 TABLET, FILM COATED ORAL
Qty: 0 | Refills: 0 | DISCHARGE
Start: 2023-03-01

## 2023-03-01 RX ORDER — DORZOLAMIDE HYDROCHLORIDE TIMOLOL MALEATE 20; 5 MG/ML; MG/ML
1 SOLUTION/ DROPS OPHTHALMIC
Qty: 0 | Refills: 0 | DISCHARGE
Start: 2023-03-01

## 2023-03-01 RX ORDER — BRIMONIDINE TARTRATE 2 MG/MG
1 SOLUTION/ DROPS OPHTHALMIC
Qty: 0 | Refills: 0 | DISCHARGE
Start: 2023-03-01

## 2023-03-01 RX ORDER — SENNA PLUS 8.6 MG/1
2 TABLET ORAL
Qty: 0 | Refills: 0 | DISCHARGE
Start: 2023-03-01

## 2023-03-01 RX ADMIN — Medication 1 TABLET(S): at 13:51

## 2023-03-01 RX ADMIN — DORZOLAMIDE HYDROCHLORIDE TIMOLOL MALEATE 1 DROP(S): 20; 5 SOLUTION/ DROPS OPHTHALMIC at 05:05

## 2023-03-01 RX ADMIN — Medication 1 DROP(S): at 05:05

## 2023-03-01 RX ADMIN — Medication 975 MILLIGRAM(S): at 05:04

## 2023-03-01 RX ADMIN — Medication 1300 MILLIGRAM(S): at 05:04

## 2023-03-01 RX ADMIN — Medication 1 DROP(S): at 13:52

## 2023-03-01 RX ADMIN — AMIODARONE HYDROCHLORIDE 200 MILLIGRAM(S): 400 TABLET ORAL at 05:04

## 2023-03-01 RX ADMIN — Medication 75 MILLIGRAM(S): at 13:50

## 2023-03-01 RX ADMIN — Medication 3: at 13:48

## 2023-03-01 RX ADMIN — PANTOPRAZOLE SODIUM 40 MILLIGRAM(S): 20 TABLET, DELAYED RELEASE ORAL at 05:04

## 2023-03-01 RX ADMIN — Medication 2: at 09:27

## 2023-03-01 RX ADMIN — CHLORHEXIDINE GLUCONATE 1 APPLICATION(S): 213 SOLUTION TOPICAL at 12:59

## 2023-03-01 RX ADMIN — BRIMONIDINE TARTRATE 1 DROP(S): 2 SOLUTION/ DROPS OPHTHALMIC at 05:04

## 2023-03-01 RX ADMIN — Medication 975 MILLIGRAM(S): at 14:30

## 2023-03-01 RX ADMIN — Medication 975 MILLIGRAM(S): at 13:49

## 2023-03-01 RX ADMIN — Medication 75 MILLIGRAM(S): at 05:04

## 2023-03-01 RX ADMIN — APIXABAN 2.5 MILLIGRAM(S): 2.5 TABLET, FILM COATED ORAL at 09:30

## 2023-03-01 RX ADMIN — ISOSORBIDE MONONITRATE 60 MILLIGRAM(S): 60 TABLET, EXTENDED RELEASE ORAL at 13:52

## 2023-03-01 NOTE — DISCHARGE NOTE PROVIDER - NSDCMRMEDTOKEN_GEN_ALL_CORE_FT
amiodarone 200 mg oral tablet: 1 tab(s) orally once a day  apixaban 2.5 mg oral tablet: 1 tab(s) orally every 12 hours  hydrALAZINE 25 mg oral tablet: 3 tab(s) orally 3 times a day  insulin lispro 100 units/mL injectable solution: injectable 3 times a day (before meals)  isosorbide mononitrate 60 mg oral tablet, extended release: 1 tab(s) orally once a day  Multiple Vitamins oral tablet: 1 tab(s) orally once a day  pantoprazole 40 mg oral delayed release tablet: 1 tab(s) orally once a day (before a meal)  SIMVASTATIN 40 MG TABLET: take 1 tablet by mouth once daily  sodium bicarbonate 650 mg oral tablet: 2 tab(s) orally 2 times a day   acetaminophen 325 mg oral tablet: 3 tab(s) orally every 12 hours, As Needed  amiodarone 200 mg oral tablet: 1 tab(s) orally once a day  apixaban 2.5 mg oral tablet: 1 tab(s) orally every 12 hours  brimonidine 0.2% ophthalmic solution: 1 drop(s) to each affected eye 2 times a day  dorzolamide-timolol 2%-0.5% preservative-free ophthalmic solution: 1 drop(s) to each affected eye 2 times a day  hydrALAZINE 25 mg oral tablet: 3 tab(s) orally 3 times a day  insulin lispro 100 units/mL injectable solution: injectable 3 times a day (before meals)  isosorbide mononitrate 60 mg oral tablet, extended release: 1 tab(s) orally once a day  Multiple Vitamins oral tablet: 1 tab(s) orally once a day  pantoprazole 40 mg oral delayed release tablet: 1 tab(s) orally once a day (before a meal)  polyethylene glycol 3350 oral powder for reconstitution: 17 gram(s) orally once a day (at bedtime)  Hold for loose stool   prednisoLONE acetate 1% ophthalmic suspension: 1 drop(s) to each affected eye 3 times a day  senna leaf extract oral tablet: 2 tab(s) orally once a day (at bedtime)  Hold for loose stool   SIMVASTATIN 40 MG TABLET: take 1 tablet by mouth once daily  sodium bicarbonate 650 mg oral tablet: 2 tab(s) orally 2 times a day  traMADol 50 mg oral tablet: 0.5 tab(s) orally every 6 hours, As needed, Mild Pain (1 - 3)  Hold for sedation    acetaminophen 325 mg oral tablet: 3 tab(s) orally every 12 hours, As Needed  amiodarone 200 mg oral tablet: 1 tab(s) orally once a day  apixaban 2.5 mg oral tablet: 1 tab(s) orally every 12 hours  atorvastatin 40 mg oral tablet: 1 tab(s) orally once a day (at bedtime)  brimonidine 0.2% ophthalmic solution: 1 drop(s) to each affected eye 2 times a day  dorzolamide-timolol 2%-0.5% preservative-free ophthalmic solution: 1 drop(s) to each affected eye 2 times a day  hydrALAZINE 25 mg oral tablet: 3 tab(s) orally 3 times a day  insulin lispro 100 units/mL injectable solution: injectable once a day (at bedtime)  FS ac and HS   0 Unit(s) if Glucose 0 - 250  1 Unit(s) if Glucose 251 - 300  2 Unit(s) if Glucose 301 - 350  3 Unit(s) if Glucose 351 - 400  4 Unit(s) if Glucose Greater Than 400  insulin lispro 100 units/mL injectable solution: injectable 3 times a day (before meals)  FS AC and HS   1 Unit(s) if Glucose 151 - 200  2 Unit(s) if Glucose 201 - 250  3 Unit(s) if Glucose 251 - 300  4 Unit(s) if Glucose 301 - 350  5 Unit(s) if Glucose 351 - 400  6 Unit(s) if Glucose Greater Than 400  isosorbide mononitrate 60 mg oral tablet, extended release: 1 tab(s) orally once a day  Multiple Vitamins oral tablet: 1 tab(s) orally once a day  pantoprazole 40 mg oral delayed release tablet: 1 tab(s) orally once a day (before a meal)  polyethylene glycol 3350 oral powder for reconstitution: 17 gram(s) orally once a day (at bedtime)  Hold for loose stool   prednisoLONE acetate 1% ophthalmic suspension: 1 drop(s) to each affected eye 3 times a day  senna leaf extract oral tablet: 2 tab(s) orally once a day (at bedtime)  Hold for loose stool   sodium bicarbonate 650 mg oral tablet: 2 tab(s) orally 2 times a day  traMADol 50 mg oral tablet: 0.5 tab(s) orally every 6 hours, As needed, Mild Pain (1 - 3)  Hold for sedation

## 2023-03-01 NOTE — PROGRESS NOTE ADULT - SUBJECTIVE AND OBJECTIVE BOX
INTERVAL HPI/OVERNIGHT EVENTS:  Pt seen and examined at bedside.     Allergies/Intolerance: No Known Allergies      MEDICATIONS  (STANDING):  acetaminophen     Tablet .. 975 milliGRAM(s) Oral every 8 hours  aMIOdarone    Tablet 200 milliGRAM(s) Oral daily  apixaban 2.5 milliGRAM(s) Oral every 12 hours  atorvastatin 40 milliGRAM(s) Oral at bedtime  brimonidine 0.2% Ophthalmic Solution 1 Drop(s) Both EYES two times a day  chlorhexidine 2% Cloths 1 Application(s) Topical daily  dextrose 5%. 1000 milliLiter(s) (100 mL/Hr) IV Continuous <Continuous>  dextrose 5%. 1000 milliLiter(s) (50 mL/Hr) IV Continuous <Continuous>  dextrose 50% Injectable 25 Gram(s) IV Push once  dextrose 50% Injectable 12.5 Gram(s) IV Push once  dextrose 50% Injectable 25 Gram(s) IV Push once  dorzolamide 2%/timolol 0.5% Ophthalmic Solution 1 Drop(s) Both EYES two times a day  glucagon  Injectable 1 milliGRAM(s) IntraMuscular once  hydrALAZINE 75 milliGRAM(s) Oral three times a day  insulin lispro (ADMELOG) corrective regimen sliding scale   SubCutaneous three times a day before meals  insulin lispro (ADMELOG) corrective regimen sliding scale   SubCutaneous at bedtime  isosorbide   mononitrate ER Tablet (IMDUR) 60 milliGRAM(s) Oral daily  multivitamin 1 Tablet(s) Oral daily  pantoprazole    Tablet 40 milliGRAM(s) Oral before breakfast  polyethylene glycol 3350 17 Gram(s) Oral at bedtime  prednisoLONE acetate 1% Suspension 1 Drop(s) Left EYE three times a day  senna 2 Tablet(s) Oral at bedtime  sodium bicarbonate 1300 milliGRAM(s) Oral two times a day    MEDICATIONS  (PRN):  dextrose Oral Gel 15 Gram(s) Oral once PRN Blood Glucose LESS THAN 70 milliGRAM(s)/deciliter  traMADol 25 milliGRAM(s) Oral every 6 hours PRN Mild Pain (1 - 3)        ROS: all systems reviewed and wnl      PHYSICAL EXAMINATION:  Vital Signs Last 24 Hrs  T(C): 36.6 (01 Mar 2023 05:04), Max: 37.2 (28 Feb 2023 13:04)  T(F): 97.9 (01 Mar 2023 05:04), Max: 98.9 (28 Feb 2023 13:04)  HR: 60 (01 Mar 2023 05:04) (60 - 88)  BP: 166/77 (01 Mar 2023 05:04) (133/62 - 166/77)  BP(mean): --  RR: 18 (01 Mar 2023 05:04) (18 - 18)  SpO2: 98% (01 Mar 2023 05:04) (94% - 99%)    Parameters below as of 01 Mar 2023 05:04  Patient On (Oxygen Delivery Method): room air      CAPILLARY BLOOD GLUCOSE      POCT Blood Glucose.: 249 mg/dL (01 Mar 2023 09:25)  POCT Blood Glucose.: 257 mg/dL (28 Feb 2023 22:00)  POCT Blood Glucose.: 286 mg/dL (28 Feb 2023 18:20)  POCT Blood Glucose.: 210 mg/dL (28 Feb 2023 14:18)  POCT Blood Glucose.: 254 mg/dL (28 Feb 2023 12:48)      02-28 @ 07:01  -  03-01 @ 07:00  --------------------------------------------------------  IN: 240 mL / OUT: 200 mL / NET: 40 mL        GENERAL: stable, in chair, comfortable on RA, no peraza.    NECK: supple, No JVD  CHEST/LUNG: clear to auscultation bilaterally; no rales, rhonchi, or wheezing b/l  HEART: normal S1, S2  ABDOMEN: BS+, soft, ND, NT   EXTREMITIES:  pulses palpable; no clubbing, cyanosis, or edema b/l LEs    LABS:                        8.2    10.29 )-----------( 255      ( 28 Feb 2023 03:22 )             28.1     02-28    142  |  108  |  60<H>  ----------------------------<  186<H>  4.8   |  20<L>  |  4.47<H>    Ca    8.7      28 Feb 2023 03:22      PTT - ( 28 Feb 2023 11:01 )  PTT:64.0 sec

## 2023-03-01 NOTE — DISCHARGE NOTE PROVIDER - CARE PROVIDER_API CALL
Agnes Baez  CARDIOVASCULAR DISEASE  [id number] [place] [address]  Phone: [phone]  Fax: [phone]  Follow Up Time:

## 2023-03-01 NOTE — PROGRESS NOTE ADULT - SUBJECTIVE AND OBJECTIVE BOX
CARDIOLOGY     PROGRESS  NOTE   ________________________________________________    CHIEF COMPLAINT:Patient is a 83y old  Male who presents with a chief complaint of Per chart, "83 yo male w/ pmhx of CKD, DM, HTN presented to the ED [facility]IB[facility] 2/2 due to fall. Right hip femoral fracture;  2/2 due to fall and NPO On 2/19/2023 for OR for hip surgery."  no complain    	  REVIEW OF SYSTEMS:  CONSTITUTIONAL: No fever, weight loss, or fatigue  EYES: No eye pain, visual disturbances, or discharge  ENT:  No difficulty hearing, tinnitus, vertigo; No sinus or throat pain  NECK: No pain or stiffness  RESPIRATORY: No cough, wheezing, chills or hemoptysis; No Shortness of Breath  CARDIOVASCULAR: No chest pain, palpitations, passing out, dizziness, or leg swelling  GASTROINTESTINAL: No abdominal or epigastric pain. No nausea, vomiting, or hematemesis; No diarrhea or constipation. No melena or hematochezia.  GENITOURINARY: No dysuria, frequency, hematuria, or incontinence  NEUROLOGICAL: No headaches, memory loss, loss of strength, numbness, or tremors  SKIN: No itching, burning, rashes, or lesions   LYMPH Nodes: No enlarged glands  ENDOCRINE: No heat or cold intolerance; No hair loss  MUSCULOSKELETAL: No joint pain or swelling; No muscle, back, or extremity pain  PSYCHIATRIC: No depression, anxiety, mood swings, or difficulty sleeping  HEME/LYMPH: No easy bruising, or bleeding gums  ALLERGY AND IMMUNOLOGIC: No hives or eczema	    [ ] All others negative	  [ ] Unable to obtain    PHYSICAL EXAM:  T(C): 36.6 (03-01-23 @ 05:04), Max: 37.2 (02-28-23 @ 13:04)  HR: 60 (03-01-23 @ 05:04) (60 - 88)  BP: 166/77 (03-01-23 @ 05:04) (133/62 - 166/77)  RR: 18 (03-01-23 @ 05:04) (18 - 18)  SpO2: 98% (03-01-23 @ 05:04) (94% - 99%)  Wt(kg): --  I&O's Summary    28 Feb 2023 07:01  -  01 Mar 2023 07:00  --------------------------------------------------------  IN: 240 mL / OUT: 200 mL / NET: 40 mL        Appearance: Normal	  HEENT:   Normal oral mucosa, PERRL, EOMI	  Lymphatic: No lymphadenopathy  Cardiovascular: Normal S1 S2, No JVD, + murmurs, No edema  Respiratory: rhonchi  Psychiatry: A & O x 3, Mood & affect appropriate  Gastrointestinal:  Soft, Non-tender, + BS	  Skin: No rashes, No ecchymoses, No cyanosis	  Extremities: Normal range of motion, No clubbing, cyanosis or edema  Vascular: Peripheral pulses palpable 2+ bilaterally    MEDICATIONS  (STANDING):  acetaminophen     Tablet .. 975 milliGRAM(s) Oral every 8 hours  aMIOdarone    Tablet 200 milliGRAM(s) Oral daily  apixaban 2.5 milliGRAM(s) Oral every 12 hours  atorvastatin 40 milliGRAM(s) Oral at bedtime  brimonidine 0.2% Ophthalmic Solution 1 Drop(s) Both EYES two times a day  chlorhexidine 2% Cloths 1 Application(s) Topical daily  dextrose 5%. 1000 milliLiter(s) (100 mL/Hr) IV Continuous <Continuous>  dextrose 5%. 1000 milliLiter(s) (50 mL/Hr) IV Continuous <Continuous>  dextrose 50% Injectable 25 Gram(s) IV Push once  dextrose 50% Injectable 12.5 Gram(s) IV Push once  dextrose 50% Injectable 25 Gram(s) IV Push once  dorzolamide 2%/timolol 0.5% Ophthalmic Solution 1 Drop(s) Both EYES two times a day  glucagon  Injectable 1 milliGRAM(s) IntraMuscular once  hydrALAZINE 75 milliGRAM(s) Oral three times a day  insulin lispro (ADMELOG) corrective regimen sliding scale   SubCutaneous three times a day before meals  insulin lispro (ADMELOG) corrective regimen sliding scale   SubCutaneous at bedtime  isosorbide   mononitrate ER Tablet (IMDUR) 60 milliGRAM(s) Oral daily  multivitamin 1 Tablet(s) Oral daily  pantoprazole    Tablet 40 milliGRAM(s) Oral before breakfast  polyethylene glycol 3350 17 Gram(s) Oral at bedtime  prednisoLONE acetate 1% Suspension 1 Drop(s) Left EYE three times a day  senna 2 Tablet(s) Oral at bedtime  sodium bicarbonate 1300 milliGRAM(s) Oral two times a day      TELEMETRY: 	    ECG:  	  RADIOLOGY:  OTHER: 	  	  LABS:	 	    CARDIAC MARKERS:                                8.2    10.29 )-----------( 255      ( 28 Feb 2023 03:22 )             28.1     02-28    142  |  108  |  60<H>  ----------------------------<  186<H>  4.8   |  20<L>  |  4.47<H>    Ca    8.7      28 Feb 2023 03:22      proBNP: Serum Pro-Brain Natriuretic Peptide: 6136 pg/mL (02-17 @ 02:45)    Lipid Profile:   HgA1c:   TSH: Thyroid Stimulating Hormone, Serum: 0.71 uIU/mL (02-21 @ 16:38)  Thyroid Stimulating Hormone, Serum: 1.55 uIU/mL (02-21 @ 07:17)    PTT - ( 28 Feb 2023 11:01 )  PTT:64.0 sec      Assessment and plan  ---------------------------  82M PMHx of DM presenting with right hip pain s/p mechanical fall today. Describes slipping in the kitchen and falling on his right side w/ right sided hip pain mild. Denies any LOC. Denies any antiplatelet or AC.   pt with hx of htn, ckd, no known hx of cad, who is very active with no cardiac complain ,s/p tripped and fall, pt was admitted to [facility] had mild elevation of trop with no sig jump , no cpk and MB was drawn. pt with no chest pain.  had a long discussion with son health care proxy the results of blood test, echo discussed with length with him the possibility of sig CAD and risk of MI and death  in addition to other medical co morbidity  renal disease explained to the health care proxy son  and son claims father  can not live with hip fx and not  being able to walk and understands the risk.  continue beta blocker and hydralazine , keep hgb at least >8  increase beta blocker to 25 mg po bid  PAF/ flutter in flutter last night  start on amio try to keep in nsr  AC if no contraindication from ortho  asa daily  discussed with pt , sons, pmd n cardiolgist  his pmd agreed with ppm, soin maximoo is the health care proxy and pt refuses ppm, risk of fall/death explained the conversation witnessed by nursing and Dr Reyna  also i discusdsed with them regarding the risk of AC when there is a possibility of syncope he will decide if he wants long term AC  tele noted pt with PAF/ mobitz 1 and heart block, pt NEEDS ppm so we can control his hr for a.fib  will discuss with son again

## 2023-03-01 NOTE — DISCHARGE NOTE PROVIDER - HOSPITAL COURSE
81 yo male w/ pmhx of CKD, DM, HTN [facility]    Right hip femoral fracture due to fall. Had right hip surgery. Stable in bed.  CT chest no pneumonia.  Stop all ABX.   WBAT.     COVID- 19 :  No evidence of Hypoxia, no treatment needed.     Elevated Troponin; continue to trend, unable to assess if any chest pain, patient is currently confused. TTE decreased EF 40 %. EKG notes inferior and septal MI. CHF, euvolemic.      DM: stable, hold all meds. SSC.   Acceptable today around 195 off Lantus.      Essential HTN: resume home medication lopressor, lipitor and Imdur.  Hydralazine, and Norvasc.    Only on Amiodarone now.         Renal: Creatinine 4.60. Renal consult was called, Dr. Cook will see.  Creatinine one year ago was 4.10. Renal has cleared for discharge.        Stable on tele, some bradycardia.  PT eval.  Changed to PO bicarb.     IV Heparin for now. Tele shows second degree HB but rate is 60-80, then mostly A.fib rate controlled.   Patient not willing to have PPM here, wants to speak to outside Cardiologist.     Discharge to rehab today.   Family members states patient does not want a PPM.      81 yo male w/ pmhx of CKD, DM, HTN [facility]    Right hip femoral fracture due to fall. Had right hip surgery. Stable in bed.  CT chest no pneumonia.  Stop all ABX.   WBAT.     COVID- 19 :  No evidence of Hypoxia, no treatment needed.     Elevated Troponin; continue to trend, unable to assess if any chest pain, patient is currently confused. TTE decreased EF 40 %. EKG notes inferior and septal MI. CHF, euvolemic.      DM: stable, hold all meds. SSC.   Acceptable today around 195 off Lantus.      Essential HTN: resume home medication lopressor, lipitor and Imdur.  Hydralazine, and Norvasc.    Only on Amiodarone now.         Renal: Creatinine 4.60. Renal consult was called, Dr. Cook will see.  Creatinine one year ago was 4.10. Renal has cleared for discharge.        Stable on tele, some bradycardia.  PT eval.  Changed to PO bicarb.     IV Heparin for now. Tele shows second degree HB but rate is 60-80, then mostly A.fib rate controlled.   Patient not willing to have PPM here, wants to speak to outside Cardiologist.     Discharge to rehab on 3/1.  Family members states patient does not want a PPM.      83 yo male w/ pmhx of CKD, DM, HTN presented to the ED [facility] 2/2 due to fall at home.     Right hip femoral fracture due to fall. Had right hip surgery. Stable in bed.  CT chest no pneumonia.  Stop all ABX.   WBAT.     COVID- 19 :  No evidence of Hypoxia, no treatment needed.     Elevated Troponin; continue to trend, unable to assess if any chest pain, patient is currently confused. TTE decreased EF 40 %. EKG notes inferior and septal MI. CHF, euvolemic.      DM: stable, hold all meds. SSC.   Acceptable today around 195 off Lantus.      Essential HTN: resume home medication lopressor, lipitor and Imdur.  Hydralazine, and Norvasc.    Only on Amiodarone now.         Renal: Creatinine 4.60. Renal consult was called, Dr. Cook will see.  Creatinine one year ago was 4.10. Renal has cleared for discharge.        Stable on tele, some bradycardia.  PT eval.  Changed to PO bicarb.     IV Heparin for now. Tele shows second degree HB but rate is 60-80, then mostly A.fib rate controlled.   Patient not willing to have PPM here, wants to speak to outside Cardiologist.     Discharge to rehab on 3/1.  Family members states patient does not want a PPM, was offered PPM on multiple occasions.

## 2023-03-01 NOTE — PROGRESS NOTE ADULT - PROVIDER SPECIALTY LIST ADULT
Cardiology
Hospitalist
Hospitalist
Internal Medicine
Nephrology
Orthopedics
Cardiology
Hospitalist
Internal Medicine
Nephrology
Hospitalist
Nephrology
Orthopedics
Orthopedics

## 2023-03-01 NOTE — PROGRESS NOTE ADULT - ASSESSMENT
81 yo male w/ pmhx of CKD, DM, HTN presented to the ED [facility] 2/2 due to fall.        Plan: Right hip femoral fracture due to fall. Had right hip surgery. Stable in bed.  CT chest no pneumonia.  Stop all ABX.   WBAT.     COVID- 19 :  No evidence of Hypoxia, no treatment needed.     Elevated Troponin; continue to trend, unable to assess if any chest pain, patient is currently confused. TTE decreased EF 40 %. EKG notes inferior and septal MI. CHF, euvolemic.      DM: stable, hold all meds. SSC.   Acceptable today around 195 off Lantus.      Essential HTN: resume home medication lopressor, lipitor and Imdur.  Hydralazine, and Norvasc.    Only on Amiodarone now.         Renal: Creatinine 4.60. Renal consult was called, Dr. Cook will see.  Creatinine one year ago was 4.10. Renal has cleared for discharge.        Stable on tele, some bradycardia.  PT eval.  Changed to PO bicarb.     IV Heparin for now. Tele shows second degree HB but rate is 60-80, then mostly A.fib rate controlled.   Patient not willing to have PPM here, wants to speak to outside Cardiologist.     Discharge to rehab today.   Family members states patient does not want a PPM.

## 2023-05-29 ENCOUNTER — INPATIENT (INPATIENT)
Facility: HOSPITAL | Age: 83
LOS: 6 days | Discharge: HOSPICE | End: 2023-06-05
Attending: STUDENT IN AN ORGANIZED HEALTH CARE EDUCATION/TRAINING PROGRAM | Admitting: STUDENT IN AN ORGANIZED HEALTH CARE EDUCATION/TRAINING PROGRAM
Payer: MEDICARE

## 2023-05-29 VITALS
SYSTOLIC BLOOD PRESSURE: 166 MMHG | HEART RATE: 88 BPM | OXYGEN SATURATION: 100 % | WEIGHT: 100.09 LBS | DIASTOLIC BLOOD PRESSURE: 87 MMHG | TEMPERATURE: 98 F | RESPIRATION RATE: 21 BRPM | HEIGHT: 68 IN

## 2023-05-29 LAB
ALBUMIN SERPL ELPH-MCNC: 2.5 G/DL — LOW (ref 3.3–5)
ALP SERPL-CCNC: 82 U/L — SIGNIFICANT CHANGE UP (ref 40–120)
ALT FLD-CCNC: 23 U/L — SIGNIFICANT CHANGE UP (ref 12–78)
AMMONIA BLD-MCNC: <10 UMOL/L — LOW (ref 11–32)
ANION GAP SERPL CALC-SCNC: 4 MMOL/L — LOW (ref 5–17)
APPEARANCE UR: CLEAR — SIGNIFICANT CHANGE UP
APTT BLD: 33.5 SEC — SIGNIFICANT CHANGE UP (ref 27.5–35.5)
AST SERPL-CCNC: 19 U/L — SIGNIFICANT CHANGE UP (ref 15–37)
BACTERIA # UR AUTO: ABNORMAL
BASOPHILS # BLD AUTO: 0.03 K/UL — SIGNIFICANT CHANGE UP (ref 0–0.2)
BASOPHILS NFR BLD AUTO: 0.5 % — SIGNIFICANT CHANGE UP (ref 0–2)
BILIRUB SERPL-MCNC: 0.4 MG/DL — SIGNIFICANT CHANGE UP (ref 0.2–1.2)
BILIRUB UR-MCNC: NEGATIVE — SIGNIFICANT CHANGE UP
BUN SERPL-MCNC: 67 MG/DL — HIGH (ref 7–23)
CALCIUM SERPL-MCNC: 8.7 MG/DL — SIGNIFICANT CHANGE UP (ref 8.5–10.1)
CHLORIDE SERPL-SCNC: 116 MMOL/L — HIGH (ref 96–108)
CO2 SERPL-SCNC: 21 MMOL/L — LOW (ref 22–31)
COLOR SPEC: YELLOW — SIGNIFICANT CHANGE UP
CREAT SERPL-MCNC: 4.86 MG/DL — HIGH (ref 0.5–1.3)
DIFF PNL FLD: ABNORMAL
EGFR: 11 ML/MIN/1.73M2 — LOW
EOSINOPHIL # BLD AUTO: 0.07 K/UL — SIGNIFICANT CHANGE UP (ref 0–0.5)
EOSINOPHIL NFR BLD AUTO: 1.1 % — SIGNIFICANT CHANGE UP (ref 0–6)
FLUAV AG NPH QL: SIGNIFICANT CHANGE UP
FLUBV AG NPH QL: SIGNIFICANT CHANGE UP
GLUCOSE BLDC GLUCOMTR-MCNC: 99 MG/DL — SIGNIFICANT CHANGE UP (ref 70–99)
GLUCOSE SERPL-MCNC: 104 MG/DL — HIGH (ref 70–99)
GLUCOSE UR QL: 250 MG/DL
HCT VFR BLD CALC: 37.8 % — LOW (ref 39–50)
HGB BLD-MCNC: 11.1 G/DL — LOW (ref 13–17)
IMM GRANULOCYTES NFR BLD AUTO: 0.6 % — SIGNIFICANT CHANGE UP (ref 0–0.9)
INR BLD: 1.14 RATIO — SIGNIFICANT CHANGE UP (ref 0.88–1.16)
KETONES UR-MCNC: NEGATIVE — SIGNIFICANT CHANGE UP
LACTATE SERPL-SCNC: 0.7 MMOL/L — SIGNIFICANT CHANGE UP (ref 0.7–2)
LEUKOCYTE ESTERASE UR-ACNC: NEGATIVE — SIGNIFICANT CHANGE UP
LYMPHOCYTES # BLD AUTO: 1.12 K/UL — SIGNIFICANT CHANGE UP (ref 1–3.3)
LYMPHOCYTES # BLD AUTO: 17.8 % — SIGNIFICANT CHANGE UP (ref 13–44)
MAGNESIUM SERPL-MCNC: 2.1 MG/DL — SIGNIFICANT CHANGE UP (ref 1.6–2.6)
MCHC RBC-ENTMCNC: 26.6 PG — LOW (ref 27–34)
MCHC RBC-ENTMCNC: 29.4 G/DL — LOW (ref 32–36)
MCV RBC AUTO: 90.4 FL — SIGNIFICANT CHANGE UP (ref 80–100)
MONOCYTES # BLD AUTO: 0.55 K/UL — SIGNIFICANT CHANGE UP (ref 0–0.9)
MONOCYTES NFR BLD AUTO: 8.7 % — SIGNIFICANT CHANGE UP (ref 2–14)
NEUTROPHILS # BLD AUTO: 4.48 K/UL — SIGNIFICANT CHANGE UP (ref 1.8–7.4)
NEUTROPHILS NFR BLD AUTO: 71.3 % — SIGNIFICANT CHANGE UP (ref 43–77)
NITRITE UR-MCNC: NEGATIVE — SIGNIFICANT CHANGE UP
NRBC # BLD: 0 /100 WBCS — SIGNIFICANT CHANGE UP (ref 0–0)
PH UR: 6 — SIGNIFICANT CHANGE UP (ref 5–8)
PHOSPHATE SERPL-MCNC: 3.2 MG/DL — SIGNIFICANT CHANGE UP (ref 2.5–4.5)
PLATELET # BLD AUTO: 232 K/UL — SIGNIFICANT CHANGE UP (ref 150–400)
POTASSIUM SERPL-MCNC: 5.6 MMOL/L — HIGH (ref 3.5–5.3)
POTASSIUM SERPL-SCNC: 5.6 MMOL/L — HIGH (ref 3.5–5.3)
PROT SERPL-MCNC: 6.7 GM/DL — SIGNIFICANT CHANGE UP (ref 6–8.3)
PROT UR-MCNC: 500 MG/DL
PROTHROM AB SERPL-ACNC: 13.7 SEC — HIGH (ref 10.5–13.4)
RBC # BLD: 4.18 M/UL — LOW (ref 4.2–5.8)
RBC # FLD: 15 % — HIGH (ref 10.3–14.5)
RBC CASTS # UR COMP ASSIST: SIGNIFICANT CHANGE UP /HPF (ref 0–4)
SARS-COV-2 RNA SPEC QL NAA+PROBE: SIGNIFICANT CHANGE UP
SODIUM SERPL-SCNC: 141 MMOL/L — SIGNIFICANT CHANGE UP (ref 135–145)
SP GR SPEC: 1.01 — SIGNIFICANT CHANGE UP (ref 1.01–1.02)
TROPONIN I, HIGH SENSITIVITY RESULT: 95.3 NG/L — HIGH
UROBILINOGEN FLD QL: NEGATIVE MG/DL — SIGNIFICANT CHANGE UP
WBC # BLD: 6.29 K/UL — SIGNIFICANT CHANGE UP (ref 3.8–10.5)
WBC # FLD AUTO: 6.29 K/UL — SIGNIFICANT CHANGE UP (ref 3.8–10.5)
WBC UR QL: SIGNIFICANT CHANGE UP

## 2023-05-29 PROCEDURE — 70450 CT HEAD/BRAIN W/O DYE: CPT | Mod: 26,MA

## 2023-05-29 PROCEDURE — 99285 EMERGENCY DEPT VISIT HI MDM: CPT

## 2023-05-29 PROCEDURE — 93010 ELECTROCARDIOGRAM REPORT: CPT

## 2023-05-29 PROCEDURE — 99223 1ST HOSP IP/OBS HIGH 75: CPT

## 2023-05-29 PROCEDURE — 71045 X-RAY EXAM CHEST 1 VIEW: CPT | Mod: 26

## 2023-05-29 RX ORDER — AMIODARONE HYDROCHLORIDE 400 MG/1
200 TABLET ORAL DAILY
Refills: 0 | Status: DISCONTINUED | OUTPATIENT
Start: 2023-05-29 | End: 2023-06-05

## 2023-05-29 RX ORDER — BRIMONIDINE TARTRATE 2 MG/MG
1 SOLUTION/ DROPS OPHTHALMIC
Refills: 0 | Status: DISCONTINUED | OUTPATIENT
Start: 2023-05-29 | End: 2023-06-05

## 2023-05-29 RX ORDER — SENNA PLUS 8.6 MG/1
2 TABLET ORAL AT BEDTIME
Refills: 0 | Status: DISCONTINUED | OUTPATIENT
Start: 2023-05-29 | End: 2023-06-05

## 2023-05-29 RX ORDER — DORZOLAMIDE HYDROCHLORIDE TIMOLOL MALEATE 20; 5 MG/ML; MG/ML
1 SOLUTION/ DROPS OPHTHALMIC
Refills: 0 | Status: DISCONTINUED | OUTPATIENT
Start: 2023-05-29 | End: 2023-06-05

## 2023-05-29 RX ORDER — APIXABAN 2.5 MG/1
2.5 TABLET, FILM COATED ORAL EVERY 12 HOURS
Refills: 0 | Status: DISCONTINUED | OUTPATIENT
Start: 2023-05-29 | End: 2023-06-05

## 2023-05-29 RX ORDER — HYDRALAZINE HCL 50 MG
25 TABLET ORAL THREE TIMES A DAY
Refills: 0 | Status: DISCONTINUED | OUTPATIENT
Start: 2023-05-29 | End: 2023-06-05

## 2023-05-29 RX ORDER — OLANZAPINE 15 MG/1
2.5 TABLET, FILM COATED ORAL EVERY 12 HOURS
Refills: 0 | Status: DISCONTINUED | OUTPATIENT
Start: 2023-05-29 | End: 2023-06-05

## 2023-05-29 RX ORDER — SODIUM BICARBONATE 1 MEQ/ML
650 SYRINGE (ML) INTRAVENOUS THREE TIMES A DAY
Refills: 0 | Status: DISCONTINUED | OUTPATIENT
Start: 2023-05-29 | End: 2023-06-05

## 2023-05-29 RX ORDER — ISOSORBIDE MONONITRATE 60 MG/1
60 TABLET, EXTENDED RELEASE ORAL DAILY
Refills: 0 | Status: DISCONTINUED | OUTPATIENT
Start: 2023-05-29 | End: 2023-06-05

## 2023-05-29 RX ORDER — OLANZAPINE 15 MG/1
5 TABLET, FILM COATED ORAL
Refills: 0 | Status: DISCONTINUED | OUTPATIENT
Start: 2023-05-29 | End: 2023-06-02

## 2023-05-29 RX ORDER — HYDRALAZINE HCL 50 MG
50 TABLET ORAL THREE TIMES A DAY
Refills: 0 | Status: DISCONTINUED | OUTPATIENT
Start: 2023-05-29 | End: 2023-05-29

## 2023-05-29 RX ORDER — SODIUM ZIRCONIUM CYCLOSILICATE 10 G/10G
10 POWDER, FOR SUSPENSION ORAL ONCE
Refills: 0 | Status: COMPLETED | OUTPATIENT
Start: 2023-05-29 | End: 2023-05-29

## 2023-05-29 RX ORDER — MULTIVIT-MIN/FERROUS GLUCONATE 9 MG/15 ML
1 LIQUID (ML) ORAL DAILY
Refills: 0 | Status: DISCONTINUED | OUTPATIENT
Start: 2023-05-29 | End: 2023-06-05

## 2023-05-29 RX ORDER — ASPIRIN/CALCIUM CARB/MAGNESIUM 324 MG
81 TABLET ORAL DAILY
Refills: 0 | Status: DISCONTINUED | OUTPATIENT
Start: 2023-05-29 | End: 2023-06-03

## 2023-05-29 RX ORDER — PANTOPRAZOLE SODIUM 20 MG/1
40 TABLET, DELAYED RELEASE ORAL
Refills: 0 | Status: DISCONTINUED | OUTPATIENT
Start: 2023-05-29 | End: 2023-06-05

## 2023-05-29 RX ORDER — HALOPERIDOL DECANOATE 100 MG/ML
2.5 INJECTION INTRAMUSCULAR EVERY 6 HOURS
Refills: 0 | Status: DISCONTINUED | OUTPATIENT
Start: 2023-05-29 | End: 2023-05-29

## 2023-05-29 RX ORDER — ATORVASTATIN CALCIUM 80 MG/1
40 TABLET, FILM COATED ORAL AT BEDTIME
Refills: 0 | Status: DISCONTINUED | OUTPATIENT
Start: 2023-05-29 | End: 2023-06-05

## 2023-05-29 RX ORDER — POLYETHYLENE GLYCOL 3350 17 G/17G
17 POWDER, FOR SOLUTION ORAL
Refills: 0 | Status: DISCONTINUED | OUTPATIENT
Start: 2023-05-29 | End: 2023-06-05

## 2023-05-29 RX ADMIN — Medication 650 MILLIGRAM(S): at 21:57

## 2023-05-29 RX ADMIN — ATORVASTATIN CALCIUM 40 MILLIGRAM(S): 80 TABLET, FILM COATED ORAL at 21:57

## 2023-05-29 RX ADMIN — SENNA PLUS 2 TABLET(S): 8.6 TABLET ORAL at 21:57

## 2023-05-29 RX ADMIN — HALOPERIDOL DECANOATE 2.5 MILLIGRAM(S): 100 INJECTION INTRAMUSCULAR at 20:18

## 2023-05-29 RX ADMIN — SODIUM ZIRCONIUM CYCLOSILICATE 10 GRAM(S): 10 POWDER, FOR SUSPENSION ORAL at 19:42

## 2023-05-29 RX ADMIN — Medication 25 MILLIGRAM(S): at 21:57

## 2023-05-29 RX ADMIN — OLANZAPINE 2.5 MILLIGRAM(S): 15 TABLET, FILM COATED ORAL at 22:11

## 2023-05-29 NOTE — ED ADULT NURSE NOTE - CHIEF COMPLAINT QUOTE
BIBA from home for failure to thrive, AMS that is getting worse for the past 1-2 weeks, as per emt, patient had a hip surgery a month ago and since then patient has been getting more and more altered, not eating, not taking his meds, patient is alert and oriented x2  hx of afib, htn, dm, chf, hyperlipidemia

## 2023-05-29 NOTE — H&P ADULT - NSHPPHYSICALEXAM_GEN_ALL_CORE
PHYSICAL EXAMINATION:  Vital Signs Last 24 Hrs  T(C): 37 (29 May 2023 18:05), Max: 37 (29 May 2023 18:05)  T(F): 98.6 (29 May 2023 18:05), Max: 98.6 (29 May 2023 18:05)  HR: 96 (29 May 2023 20:22) (88 - 100)  BP: 124/89 (29 May 2023 20:22) (124/89 - 174/98)  BP(mean): 106 (29 May 2023 18:56) (106 - 111)  RR: 17 (29 May 2023 20:22) (17 - 21)  SpO2: 98% (29 May 2023 20:22) (98% - 100%)    Parameters below as of 29 May 2023 20:22  Patient On (Oxygen Delivery Method): room air      CAPILLARY BLOOD GLUCOSE      POCT Blood Glucose.: 93 mg/dL (29 May 2023 17:51)      GENERAL: NAD, seen in ER, some agitation, started to walk in ER without supervision  ENMT: mucous membranes moist  NECK: supple, No JVD  CHEST/LUNG: clear to auscultation bilaterally; no rales, rhonchi, or wheezing b/l  HEART: normal S1, S2  ABDOMEN: BS+, soft, ND, NT   EXTREMITIES:  pulses palpable; no clubbing, cyanosis, or edema b/l LEs  NEURO: awake, alert, interactive; moves all extremities

## 2023-05-29 NOTE — H&P ADULT - NSHPLABSRESULTS_GEN_ALL_CORE
LABS:                        11.1   .  )-----------( 232      ( 29 May 2023 18:40 )             37.8         141  |  116<H>  |  67<H>  ----------------------------<  104<H>  5.6<H>   |  21<L>  |  4.86<H>    Ca    8.7      29 May 2023 18:40  Phos  3.2       Mg     2.1         TPro  6.7  /  Alb  2.5<L>  /  TBili  0.4  /  DBili  x   /  AST  19  /  ALT  23  /  AlkPhos  82      PT/INR - ( 29 May 2023 18:40 )   PT: 13.7 sec;   INR: 1.14 ratio         PTT - ( 29 May 2023 18:40 )  PTT:33.5 sec  Urinalysis Basic - ( 29 May 2023 18:40 )    Color: Yellow / Appearance: Clear / S.015 / pH: x  Gluc: x / Ketone: Negative  / Bili: Negative / Urobili: Negative mg/dL   Blood: x / Protein: 500 mg/dL / Nitrite: Negative   Leuk Esterase: Negative / RBC: 0-2 /HPF / WBC 0-2   Sq Epi: x / Non Sq Epi: x / Bacteria: Occasional          RADIOLOGY & ADDITIONAL TESTS:

## 2023-05-29 NOTE — ED ADULT NURSE NOTE - ED STAT RN HANDOFF DETAILS
report given to ED hold RN Amihsa Kincaid. Pt on cardiac monitor; tolerating RA. 20gauge on L hand, 20 gauge on RAC. Pt pending CXR.

## 2023-05-29 NOTE — ED ADULT NURSE NOTE - NSFALLRISKFACTORS_ED_ALL_ED
Bone Condition: Including osteoporosis, prolonged steroid use or metastatic bone disease/cancer/Surgery: Recent surgery, recent lower limb amputation, major abdominal or thoracic surgery Age: 85 years old or older/Bone Condition: Including osteoporosis, prolonged steroid use or metastatic bone disease/cancer/Surgery: Recent surgery, recent lower limb amputation, major abdominal or thoracic surgery

## 2023-05-29 NOTE — ED ADULT NURSE NOTE - NSFALLRISKINTERV_ED_ALL_ED
Assistance OOB with selected safe patient handling equipment if applicable/Assistance with ambulation/Communicate fall risk and risk factors to all staff, patient, and family/Monitor gait and stability/Monitor for mental status changes and reorient to person, place, and time, as needed/Provide patient with walking aids/Provide visual cue: yellow wristband, yellow gown, etc/Reinforce activity limits and safety measures with patient and family/Toileting schedule using arm’s reach rule for commode and bathroom/Use of alarms - bed, stretcher, chair and/or video monitoring/Call bell, personal items and telephone in reach/Instruct patient to call for assistance before getting out of bed/chair/stretcher/Non-slip footwear applied when patient is off stretcher/[place] to call system/Physically safe environment - no spills, clutter or unnecessary equipment/Purposeful Proactive Rounding/Room/bathroom lighting operational, light cord in reach Assistance OOB with selected safe patient handling equipment if applicable/Assistance with ambulation/Communicate fall risk and risk factors to all staff, patient, and family/Monitor gait and stability/Monitor for mental status changes and reorient to person, place, and time, as needed/Provide patient with walking aids/Provide visual cue: yellow wristband, yellow gown, etc/Reinforce activity limits and safety measures with patient and family/Toileting schedule using arm’s reach rule for commode and bathroom/Use of alarms - bed, stretcher, chair and/or video monitoring/Bed in lowest position, wheels locked, appropriate side rails in place/Call bell, personal items and telephone in reach/Instruct patient to call for assistance before getting out of bed/chair/stretcher/Non-slip footwear applied when patient is off stretcher/[place] to call system/Physically safe environment - no spills, clutter or unnecessary equipment/Purposeful Proactive Rounding/Room/bathroom lighting operational, light cord in reach

## 2023-05-29 NOTE — PATIENT PROFILE ADULT - FALL HARM RISK - HARM RISK INTERVENTIONS
Assistance with ambulation/Assistance OOB with selected safe patient handling equipment/Communicate Risk of Fall with Harm to all staff/Discuss with provider need for PT consult/Monitor gait and stability/Provide patient with walking aids - walker, cane, crutches/Reinforce activity limits and safety measures with patient and family/Tailored Fall Risk Interventions/Visual Cue: Yellow wristband and red socks/Bed in lowest position, wheels locked, appropriate side rails in place/Call bell, personal items and telephone in reach/Instruct patient to call for assistance before getting out of bed or chair/Non-slip footwear when patient is out of bed/[place] to call system/Physically safe environment - no spills, clutter or unnecessary equipment/Purposeful Proactive Rounding/Room/bathroom lighting operational, light cord in reach Assistance with ambulation/Assistance OOB with selected safe patient handling equipment/Communicate Risk of Fall with Harm to all staff/Discuss with provider need for PT consult/Monitor for mental status changes/Monitor gait and stability/Move patient closer to nurses' station/Provide patient with walking aids - walker, cane, crutches/Reinforce activity limits and safety measures with patient and family/Reorient to person, place and time as needed/Tailored Fall Risk Interventions/Toileting schedule using arm’s reach rule for commode and bathroom/Use of alarms - bed, chair and/or voice tab/Visual Cue: Yellow wristband and red socks/Bed in lowest position, wheels locked, appropriate side rails in place/Call bell, personal items and telephone in reach/Instruct patient to call for assistance before getting out of bed or chair/Non-slip footwear when patient is out of bed/[place] to call system/Physically safe environment - no spills, clutter or unnecessary equipment/Purposeful Proactive Rounding/Room/bathroom lighting operational, light cord in reach

## 2023-05-29 NOTE — ED PROVIDER NOTE - CLINICAL SUMMARY MEDICAL DECISION MAKING FREE TEXT BOX
Patient with worsening AMS x1 month otherwise noted on CT head with chronic lacunar and occipital infarcts.  Neurology Dr. Curry consulted.  Will admit to Dr Elliott for further care.

## 2023-05-29 NOTE — ED PROVIDER NOTE - OBJECTIVE STATEMENT
83-year-old male with history of hypertension hyperlipidemia diabetes right hip fracture status post repair as well as 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  Patient has not been doing well since fifth February into March status post surgery however cognitively decline started occurring around April as per son.  Patient currently seems confused unable to tell time or place.

## 2023-05-29 NOTE — ED ADULT NURSE NOTE - OBJECTIVE STATEMENT
BIB EMS for AMS. Patient currently awake and disoriented to place and time. R afebrile. EKG=AFib. Patient PRAKASH without drift or defects, following commands.

## 2023-05-29 NOTE — ED ADULT TRIAGE NOTE - CHIEF COMPLAINT QUOTE
BIBA from home for failure to thrive, AMS that is getting worse for the past 1-2 weeks, as per emt, patient had a hip surgery a month ago and since then patient has been getting more and more altered, not eating, not taking his meds, patient is alert and oriented x2 BIBA from home for failure to thrive, AMS that is getting worse for the past 1-2 weeks, as per emt, patient had a hip surgery a month ago and since then patient has been getting more and more altered, not eating, not taking his meds, patient is alert and oriented x2  hx of afib, htn, dm, chf, hyperlipidemia

## 2023-05-29 NOTE — H&P ADULT - HISTORY OF PRESENT ILLNESS
83-year-old male PMH of hypertension,  hyperlipidemia, diabetes,  right hip fracture status post repair as well as 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  Patient has not been doing well since February into March status post surgery however worsening cognitively decline started occurring around April as per son.  Patient currently seems confused unable to tell time or place.  Per ER discussions, family members do not want HD as a treatment option.

## 2023-05-29 NOTE — H&P ADULT - ASSESSMENT
83-year-old male PMH of hypertension,  hyperlipidemia, diabetes,  right hip fracture status post repair as well as 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  Patient has not been doing well since February into March status post surgery however worsening cognitively decline started occurring around April as per son.  Patient currently seems confused unable to tell time or place.  Per ER discussions, family members do not want HD as a treatment option.         Plan:  Admit to medicine for altered mental status. CT head no acute changes, old lacunar and occipital CVA noted. UA   negative for infection, Covid negative.  Creatinine 4.86, consistent with prior levels of CKD V.     Continue home meds: Amidarone, Lipitor, Sodium bicarb, Eliquis, Hydralazine, Imdur, Protonix and MVI all at home dose.     Continue 2 glaucoma eye drops as listed in prior med rec.     PT eval, likely needs NHP.       83-year-old male PMH of hypertension,  hyperlipidemia, diabetes,  right hip fracture status post repair as well as 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  Patient has not been doing well since February into March status post surgery however worsening cognitively decline started occurring around April as per son.  Patient currently seems confused unable to tell time or place.  Per ER discussions, family members do not want HD as a treatment option.         Plan:  Admit to medicine for altered mental status, likely dementia progression. CT head no acute changes, old lacunar and occipital CVA noted. UA negative for infection, Covid negative.  Creatinine 4.86, consistent with prior levels of CKD V.     Continue home meds: Amidarone, Lipitor, Sodium bicarb, Eliquis, Hydralazine, Imdur, Protonix and MVI all at home dose.     Added Zyprexa 5 mg every night for sedation and 2.5 mg orally bid prn agitation.   Avoid Haldol as  msec.      Continue 2 glaucoma eye drops as listed in prior med rec.     PT eval, likely needs NHP.

## 2023-05-30 LAB
A1C WITH ESTIMATED AVERAGE GLUCOSE RESULT: 7.1 % — HIGH (ref 4–5.6)
ALBUMIN SERPL ELPH-MCNC: 2.5 G/DL — LOW (ref 3.3–5)
ALP SERPL-CCNC: 81 U/L — SIGNIFICANT CHANGE UP (ref 40–120)
ALT FLD-CCNC: 21 U/L — SIGNIFICANT CHANGE UP (ref 12–78)
ANION GAP SERPL CALC-SCNC: 3 MMOL/L — LOW (ref 5–17)
ANION GAP SERPL CALC-SCNC: 3 MMOL/L — LOW (ref 5–17)
AST SERPL-CCNC: 17 U/L — SIGNIFICANT CHANGE UP (ref 15–37)
BILIRUB SERPL-MCNC: 0.5 MG/DL — SIGNIFICANT CHANGE UP (ref 0.2–1.2)
BUN SERPL-MCNC: 64 MG/DL — HIGH (ref 7–23)
BUN SERPL-MCNC: 73 MG/DL — HIGH (ref 7–23)
CALCIUM SERPL-MCNC: 8.8 MG/DL — SIGNIFICANT CHANGE UP (ref 8.5–10.1)
CALCIUM SERPL-MCNC: 9.4 MG/DL — SIGNIFICANT CHANGE UP (ref 8.5–10.1)
CHLORIDE SERPL-SCNC: 116 MMOL/L — HIGH (ref 96–108)
CHLORIDE SERPL-SCNC: 119 MMOL/L — HIGH (ref 96–108)
CO2 SERPL-SCNC: 22 MMOL/L — SIGNIFICANT CHANGE UP (ref 22–31)
CO2 SERPL-SCNC: 23 MMOL/L — SIGNIFICANT CHANGE UP (ref 22–31)
CREAT SERPL-MCNC: 4.74 MG/DL — HIGH (ref 0.5–1.3)
CREAT SERPL-MCNC: 5.09 MG/DL — HIGH (ref 0.5–1.3)
CULTURE RESULTS: NO GROWTH — SIGNIFICANT CHANGE UP
EGFR: 11 ML/MIN/1.73M2 — LOW
EGFR: 12 ML/MIN/1.73M2 — LOW
ESTIMATED AVERAGE GLUCOSE: 157 MG/DL — HIGH (ref 68–114)
GLUCOSE BLDC GLUCOMTR-MCNC: 144 MG/DL — HIGH (ref 70–99)
GLUCOSE BLDC GLUCOMTR-MCNC: 163 MG/DL — HIGH (ref 70–99)
GLUCOSE BLDC GLUCOMTR-MCNC: 181 MG/DL — HIGH (ref 70–99)
GLUCOSE BLDC GLUCOMTR-MCNC: 94 MG/DL — SIGNIFICANT CHANGE UP (ref 70–99)
GLUCOSE SERPL-MCNC: 104 MG/DL — HIGH (ref 70–99)
GLUCOSE SERPL-MCNC: 168 MG/DL — HIGH (ref 70–99)
HCT VFR BLD CALC: 37.6 % — LOW (ref 39–50)
HGB BLD-MCNC: 11.2 G/DL — LOW (ref 13–17)
MCHC RBC-ENTMCNC: 27 PG — SIGNIFICANT CHANGE UP (ref 27–34)
MCHC RBC-ENTMCNC: 29.8 G/DL — LOW (ref 32–36)
MCV RBC AUTO: 90.6 FL — SIGNIFICANT CHANGE UP (ref 80–100)
NRBC # BLD: 0 /100 WBCS — SIGNIFICANT CHANGE UP (ref 0–0)
PLATELET # BLD AUTO: 228 K/UL — SIGNIFICANT CHANGE UP (ref 150–400)
POTASSIUM SERPL-MCNC: 5.7 MMOL/L — HIGH (ref 3.5–5.3)
POTASSIUM SERPL-MCNC: 6.4 MMOL/L — CRITICAL HIGH (ref 3.5–5.3)
POTASSIUM SERPL-SCNC: 5.7 MMOL/L — HIGH (ref 3.5–5.3)
POTASSIUM SERPL-SCNC: 6.4 MMOL/L — CRITICAL HIGH (ref 3.5–5.3)
PROT SERPL-MCNC: 6.6 GM/DL — SIGNIFICANT CHANGE UP (ref 6–8.3)
RBC # BLD: 4.15 M/UL — LOW (ref 4.2–5.8)
RBC # FLD: 14.8 % — HIGH (ref 10.3–14.5)
SODIUM SERPL-SCNC: 141 MMOL/L — SIGNIFICANT CHANGE UP (ref 135–145)
SODIUM SERPL-SCNC: 145 MMOL/L — SIGNIFICANT CHANGE UP (ref 135–145)
SPECIMEN SOURCE: SIGNIFICANT CHANGE UP
WBC # BLD: 6.81 K/UL — SIGNIFICANT CHANGE UP (ref 3.8–10.5)
WBC # FLD AUTO: 6.81 K/UL — SIGNIFICANT CHANGE UP (ref 3.8–10.5)

## 2023-05-30 PROCEDURE — 99233 SBSQ HOSP IP/OBS HIGH 50: CPT

## 2023-05-30 PROCEDURE — 99222 1ST HOSP IP/OBS MODERATE 55: CPT

## 2023-05-30 RX ORDER — INSULIN HUMAN 100 [IU]/ML
5 INJECTION, SOLUTION SUBCUTANEOUS ONCE
Refills: 0 | Status: COMPLETED | OUTPATIENT
Start: 2023-05-30 | End: 2023-05-30

## 2023-05-30 RX ORDER — OLANZAPINE 15 MG/1
2.5 TABLET, FILM COATED ORAL ONCE
Refills: 0 | Status: COMPLETED | OUTPATIENT
Start: 2023-05-30 | End: 2023-05-30

## 2023-05-30 RX ORDER — DEXTROSE 50 % IN WATER 50 %
25 SYRINGE (ML) INTRAVENOUS ONCE
Refills: 0 | Status: COMPLETED | OUTPATIENT
Start: 2023-05-30 | End: 2023-05-30

## 2023-05-30 RX ORDER — SODIUM CHLORIDE 9 MG/ML
1000 INJECTION, SOLUTION INTRAVENOUS
Refills: 0 | Status: COMPLETED | OUTPATIENT
Start: 2023-05-30 | End: 2023-05-30

## 2023-05-30 RX ORDER — SODIUM ZIRCONIUM CYCLOSILICATE 10 G/10G
10 POWDER, FOR SUSPENSION ORAL EVERY 8 HOURS
Refills: 0 | Status: COMPLETED | OUTPATIENT
Start: 2023-05-30 | End: 2023-05-31

## 2023-05-30 RX ADMIN — Medication 25 MILLIGRAM(S): at 17:18

## 2023-05-30 RX ADMIN — POLYETHYLENE GLYCOL 3350 17 GRAM(S): 17 POWDER, FOR SOLUTION ORAL at 06:38

## 2023-05-30 RX ADMIN — SENNA PLUS 2 TABLET(S): 8.6 TABLET ORAL at 22:07

## 2023-05-30 RX ADMIN — ATORVASTATIN CALCIUM 40 MILLIGRAM(S): 80 TABLET, FILM COATED ORAL at 22:06

## 2023-05-30 RX ADMIN — Medication 650 MILLIGRAM(S): at 22:06

## 2023-05-30 RX ADMIN — SODIUM CHLORIDE 100 MILLILITER(S): 9 INJECTION, SOLUTION INTRAVENOUS at 17:18

## 2023-05-30 RX ADMIN — POLYETHYLENE GLYCOL 3350 17 GRAM(S): 17 POWDER, FOR SOLUTION ORAL at 17:19

## 2023-05-30 RX ADMIN — Medication 25 MILLILITER(S): at 16:14

## 2023-05-30 RX ADMIN — Medication 25 MILLIGRAM(S): at 06:37

## 2023-05-30 RX ADMIN — BRIMONIDINE TARTRATE 1 DROP(S): 2 SOLUTION/ DROPS OPHTHALMIC at 06:38

## 2023-05-30 RX ADMIN — ISOSORBIDE MONONITRATE 60 MILLIGRAM(S): 60 TABLET, EXTENDED RELEASE ORAL at 12:22

## 2023-05-30 RX ADMIN — DORZOLAMIDE HYDROCHLORIDE TIMOLOL MALEATE 1 DROP(S): 20; 5 SOLUTION/ DROPS OPHTHALMIC at 17:19

## 2023-05-30 RX ADMIN — BRIMONIDINE TARTRATE 1 DROP(S): 2 SOLUTION/ DROPS OPHTHALMIC at 17:19

## 2023-05-30 RX ADMIN — SODIUM ZIRCONIUM CYCLOSILICATE 10 GRAM(S): 10 POWDER, FOR SUSPENSION ORAL at 22:07

## 2023-05-30 RX ADMIN — INSULIN HUMAN 5 UNIT(S): 100 INJECTION, SOLUTION SUBCUTANEOUS at 16:13

## 2023-05-30 RX ADMIN — DORZOLAMIDE HYDROCHLORIDE TIMOLOL MALEATE 1 DROP(S): 20; 5 SOLUTION/ DROPS OPHTHALMIC at 06:38

## 2023-05-30 RX ADMIN — SODIUM ZIRCONIUM CYCLOSILICATE 10 GRAM(S): 10 POWDER, FOR SUSPENSION ORAL at 17:40

## 2023-05-30 RX ADMIN — Medication 25 MILLIGRAM(S): at 22:07

## 2023-05-30 RX ADMIN — OLANZAPINE 5 MILLIGRAM(S): 15 TABLET, FILM COATED ORAL at 20:24

## 2023-05-30 RX ADMIN — PANTOPRAZOLE SODIUM 40 MILLIGRAM(S): 20 TABLET, DELAYED RELEASE ORAL at 06:37

## 2023-05-30 RX ADMIN — OLANZAPINE 2.5 MILLIGRAM(S): 15 TABLET, FILM COATED ORAL at 12:22

## 2023-05-30 RX ADMIN — APIXABAN 2.5 MILLIGRAM(S): 2.5 TABLET, FILM COATED ORAL at 06:36

## 2023-05-30 RX ADMIN — Medication 650 MILLIGRAM(S): at 17:18

## 2023-05-30 RX ADMIN — OLANZAPINE 2.5 MILLIGRAM(S): 15 TABLET, FILM COATED ORAL at 11:12

## 2023-05-30 RX ADMIN — APIXABAN 2.5 MILLIGRAM(S): 2.5 TABLET, FILM COATED ORAL at 17:19

## 2023-05-30 RX ADMIN — AMIODARONE HYDROCHLORIDE 200 MILLIGRAM(S): 400 TABLET ORAL at 06:37

## 2023-05-30 RX ADMIN — Medication 650 MILLIGRAM(S): at 06:37

## 2023-05-30 RX ADMIN — SODIUM CHLORIDE 100 MILLILITER(S): 9 INJECTION, SOLUTION INTRAVENOUS at 20:24

## 2023-05-30 NOTE — DIETITIAN INITIAL EVALUATION ADULT - REASON INDICATOR FOR ASSESSMENT
Pt seen for nutrition consult for MST score 2 or greater and for pressure injury stage II or greater; pt also with BMI<19

## 2023-05-30 NOTE — DIETITIAN NUTRITION RISK NOTIFICATION - TREATMENT: THE FOLLOWING DIET HAS BEEN RECOMMENDED
Diet, Minced and Moist:   Consistent Carbohydrate {Evening Snack}  60 gm Protein (PRO60G)  No Concentrated Potassium  Low Sodium  Supplement Feeding Modality:  Oral  [place] Cans or Servings Per Day:  1       Frequency:  Daily (05-30-23 @ 12:43) [Pending Verification By Attending]  Diet, Minced and Moist:   Consistent Carbohydrate {No Snacks} (05-29-23 @ 20:56) [Active]

## 2023-05-30 NOTE — PROGRESS NOTE ADULT - SUBJECTIVE AND OBJECTIVE BOX
Patient is a 83y old  Male who presents with a chief complaint of ALTERED MENTAL STATUS (30 May 2023 11:45)    INTERVAL HPI/OVERNIGHT EVENTS: No acute events ovenright. HD stable.     MEDICATIONS  (STANDING):  aMIOdarone    Tablet 200 milliGRAM(s) Oral daily  apixaban 2.5 milliGRAM(s) Oral every 12 hours  aspirin enteric coated 81 milliGRAM(s) Oral daily  atorvastatin 40 milliGRAM(s) Oral at bedtime  brimonidine 0.2% Ophthalmic Solution 1 Drop(s) Both EYES two times a day  dextrose 50% Injectable 25 milliLiter(s) IV Push once  dorzolamide 2%/timolol 0.5% Ophthalmic Solution 1 Drop(s) Both EYES two times a day  hydrALAZINE 25 milliGRAM(s) Oral three times a day  insulin regular  human recombinant 5 Unit(s) IV Push once  isosorbide   mononitrate ER Tablet (IMDUR) 60 milliGRAM(s) Oral daily  multivitamin/minerals 1 Tablet(s) Oral daily  OLANZapine 5 milliGRAM(s) Oral <User Schedule>  pantoprazole    Tablet 40 milliGRAM(s) Oral before breakfast  polyethylene glycol 3350 17 Gram(s) Oral two times a day  senna 2 Tablet(s) Oral at bedtime  sodium bicarbonate 650 milliGRAM(s) Oral three times a day  sodium zirconium cyclosilicate 10 Gram(s) Oral every 8 hours    MEDICATIONS  (PRN):  OLANZapine 2.5 milliGRAM(s) Oral every 12 hours PRN agitation      Allergies    No Known Allergies    Intolerances        REVIEW OF SYSTEMS: all negative with exception of above    Vital Signs Last 24 Hrs  T(C): 36.4 (30 May 2023 11:12), Max: 37 (29 May 2023 18:05)  T(F): 97.5 (30 May 2023 11:12), Max: 98.6 (29 May 2023 18:05)  HR: 97 (30 May 2023 11:12) (72 - 104)  BP: 159/81 (30 May 2023 11:12) (124/89 - 174/98)  BP(mean): 106 (29 May 2023 18:56) (106 - 111)  RR: 18 (30 May 2023 11:12) (15 - 21)  SpO2: 98% (30 May 2023 11:12) (94% - 100%)    Parameters below as of 30 May 2023 11:12  Patient On (Oxygen Delivery Method): room air    PHYSICAL EXAM:  GENERAL: NAD, seen in ER, some agitation, started to walk in ER without supervision  ENMT: mucous membranes moist  NECK: supple, No JVD  CHEST/LUNG: clear to auscultation bilaterally; no rales, rhonchi, or wheezing b/l  HEART: normal S1, S2  ABDOMEN: BS+, soft, ND, NT   EXTREMITIES:  pulses palpable; no clubbing, cyanosis, or edema b/l LEs  NEURO: awake, alert, interactive; moves all extremities    LABS:                        11.2   6.81  )-----------( 228      ( 30 May 2023 07:02 )             37.6     05-    145  |  119<H>  |  73<H>  ----------------------------<  104<H>  6.4<HH>   |  23  |  4.74<H>    Ca    9.4      30 May 2023 07:02  Phos  3.2     -  Mg     2.1     -    TPro  6.6  /  Alb  2.5<L>  /  TBili  0.5  /  DBili  x   /  AST  17  /  ALT  21  /  AlkPhos  81  05-30    PT/INR - ( 29 May 2023 18:40 )   PT: 13.7 sec;   INR: 1.14 ratio         PTT - ( 29 May 2023 18:40 )  PTT:33.5 sec  Urinalysis Basic - ( 29 May 2023 18:40 )    Color: Yellow / Appearance: Clear / S.015 / pH: x  Gluc: x / Ketone: Negative  / Bili: Negative / Urobili: Negative mg/dL   Blood: x / Protein: 500 mg/dL / Nitrite: Negative   Leuk Esterase: Negative / RBC: 0-2 /HPF / WBC 0-2   Sq Epi: x / Non Sq Epi: x / Bacteria: Occasional      CAPILLARY BLOOD GLUCOSE      POCT Blood Glucose.: 144 mg/dL (30 May 2023 11:25)  POCT Blood Glucose.: 94 mg/dL (30 May 2023 07:34)  POCT Blood Glucose.: 99 mg/dL (29 May 2023 21:09)  POCT Blood Glucose.: 93 mg/dL (29 May 2023 17:51)      RADIOLOGY & ADDITIONAL TESTS:    Imaging Personally Reviewed:  [ ] YES  [ ] NO    Consultant(s) Notes Reviewed:  [ ] YES  [ ] NO    Care Discussed with Consultants/Other Providers [ ] YES  [ ] NO

## 2023-05-30 NOTE — DIETITIAN INITIAL EVALUATION ADULT - PERTINENT LABORATORY DATA
05-29    141  |  116<H>  |  67<H>  ----------------------------<  104<H>  5.6<H>   |  21<L>  |  4.86<H>    Ca    8.7      29 May 2023 18:40  Phos  3.2     05-29  Mg     2.1     05-29    TPro  6.7  /  Alb  2.5<L>  /  TBili  0.4  /  DBili  x   /  AST  19  /  ALT  23  /  AlkPhos  82  05-29  POCT Blood Glucose.: 144 mg/dL (05-30-23 @ 11:25)  A1C with Estimated Average Glucose Result: 6.8 % (02-18-23 @ 07:05)

## 2023-05-30 NOTE — CONSULT NOTE ADULT - SUBJECTIVE AND OBJECTIVE BOX
To be completed.   History from Admission H&P yesterday evening    <Start of quote(s) from H&P>  "Reason for Admission: AMS  History of Present Illness:   83-year-old male PMH of hypertension,  hyperlipidemia, diabetes,  right hip fracture status post repair as well as 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  Patient has not been doing well since February into March status post surgery however worsening cognitively decline started occurring around April as per son.  Patient currently seems confused unable to tell time or place.  Per ER discussions, family members do not want HD as a treatment option.          Review of Systems:  Other Review of Systems: All other review of systems negative, except as noted in HPI   . . .     Home Medications:   * Incomplete Medication History as of 29-May-2023 18:20 documented in Structured Notes  · 	atorvastatin 40 mg oral tablet: Last Dose Taken:  , 1 tab(s) orally once a day (at bedtime)  · 	insulin lispro 100 units/mL injectable solution: injectable 3 times a day (before meals)  	FS AC and HS   	1 Unit(s) if Glucose 151 - 200  	 . . .    	insulin lispro 100 units/mL injectable solution: injectable once a day (at bedtime)  	FS ac and HS   	0 Unit(s) if Glucose 0 - 250  	 . . .   · 	acetaminophen 325 mg oral tablet: Last Dose Taken:  , 3 tab(s) orally every 12 hours, As Needed  · 	prednisoLONE acetate 1% ophthalmic suspension: 1 drop(s) to each affected eye 3 times a day  · 	dorzolamide-timolol 2%-0.5% preservative-free ophthalmic solution: 1 drop(s) to each affected eye 2 times a day  · 	brimonidine 0.2% ophthalmic solution: Last Dose Taken:  , 1 drop(s) to each affected eye 2 times a day  · 	polyethylene glycol 3350 oral powder for reconstitution: 17 gram(s) orally once a day (at bedtime)  	Hold for loose stool   · 	senna leaf extract oral tablet: 2 tab(s) orally once a day (at bedtime)  	Hold for loose stool   · 	traMADol 50 mg oral tablet: 0.5 tab(s) orally every 6 hours, As needed, Mild Pain (1 - 3)  	Hold for sedation   · 	apixaban 2.5 mg oral tablet: Last Dose Taken:  , 1 tab(s) orally every 12 hours  · 	pantoprazole 40 mg oral delayed release tablet: 1 tab(s) orally once a day (before a meal)  · 	Multiple Vitamins oral tablet: 1 tab(s) orally once a day  · 	sodium bicarbonate 650 mg oral tablet: 2 tab(s) orally 2 times a day  · 	hydrALAZINE 25 mg oral tablet: 3 tab(s) orally 3 times a day  · 	amiodarone 200 mg oral tablet: Last Dose Taken:  , 1 tab(s) orally once a day  · 	isosorbide mononitrate 60 mg oral tablet, extended release: 1 tab(s) orally once a day    Patient History:    Past Medical, Past Surgical, and Family History:  PAST MEDICAL HISTORY:  CHF (congestive heart failure)   DM (diabetes mellitus).     Social History:  · Substance use	No   . . .   · Has the patient used tobacco in the past 30 days?	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  Patient has not been doing well since February into March status post surgery however worsening cognitively decline started occurring around April as per son.  Patient currently seems confused unable to tell time or place.  Per ER discussions, family members do not want HD as a treatment option.          Review of Systems:  Other Review of Systems: All other review of systems negative, except as noted in HPI   . . .     Home Medications:   * Incomplete Medication History as of 29-May-2023 18:20 documented in Structured Notes  · 	atorvastatin 40 mg oral tablet: Last Dose Taken:  , 1 tab(s) orally once a day (at bedtime)  · 	insulin lispro 100 units/mL injectable solution: injectable 3 times a day (before meals)  	FS AC and HS   	1 Unit(s) if Glucose 151 - 200  	 . . .    	insulin lispro 100 units/mL injectable solution: injectable once a day (at bedtime)  	FS ac and HS   	0 Unit(s) if Glucose 0 - 250  	 . . .   · 	acetaminophen 325 mg oral tablet: Last Dose Taken:  , 3 tab(s) orally every 12 hours, As Needed  · 	prednisoLONE acetate 1% ophthalmic suspension: 1 drop(s) to each affected eye 3 times a day  · 	dorzolamide-timolol 2%-0.5% preservative-free ophthalmic solution: 1 drop(s) to each affected eye 2 times a day  · 	brimonidine 0.2% ophthalmic solution: Last Dose Taken:  , 1 drop(s) to each affected eye 2 times a day  · 	polyethylene glycol 3350 oral powder for reconstitution: 17 gram(s) orally once a day (at bedtime)  	Hold for loose stool   · 	senna leaf extract oral tablet: 2 tab(s) orally once a day (at bedtime)  	Hold for loose stool   · 	traMADol 50 mg oral tablet: 0.5 tab(s) orally every 6 hours, As needed, Mild Pain (1 - 3)  	Hold for sedation   · 	apixaban 2.5 mg oral tablet: Last Dose Taken:  , 1 tab(s) orally every 12 hours  · 	pantoprazole 40 mg oral delayed release tablet: 1 tab(s) orally once a day (before a meal)  · 	Multiple Vitamins oral tablet: 1 tab(s) orally once a day  · 	sodium bicarbonate 650 mg oral tablet: 2 tab(s) orally 2 times a day  · 	hydrALAZINE 25 mg oral tablet: 3 tab(s) orally 3 times a day  · 	amiodarone 200 mg oral tablet: Last Dose Taken:  , 1 tab(s) orally once a day  · 	isosorbide mononitrate 60 mg oral tablet, extended release: 1 tab(s) orally once a day    Patient History:    Past Medical, Past Surgical, and Family History:  PAST MEDICAL HISTORY:  CHF (congestive heart failure)   DM (diabetes mellitus).     Social History:  · Substance use	No   . . .   · Has the patient used tobacco in the past 30 days?	No"  <End of quote(s) from H&P>    Per radiology report of non-con head CT:  "COMPARISON:  Compared to study dated 2/16/2023    FINDINGS:  Exam is mildly degraded by motion.  HEMORRHAGE:  No evidence of intracranial hemorrhage.  BRAIN PARENCHYMA:  Moderate atrophy. Chronic lacunar infarct left side of   the ronni. Chronic medial left occipital infarct. Mild chronic   periventricular white matter ischemic changes No mass or mass effect.  VENTRICLES / SHIFT:  No hydrocephalus. No midline shift.  EXTRA-AXIAL / BASAL CISTERNS:  No extra-axial mass. Basal cisterns   preserved.  CALVARIUM AND EXTRACRANIAL SOFT TISSUES:  No depressed calvarial fracture.  SINUSES, ORBITS, MASTOIDS:  The visualized paranasal sinuses and mastoid   air cells are well aerated. 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 No evidence of an acute intracranial hemorrhage,   midline shift, or hydrocephalus. 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"      Albumin  2.5  TSH in February was 0.71, 1.55  Ammonia  <10    EXAMINATION    Supine in bed, asleep. Cachectic appearing.  Transiently arousable; non-verbal.  Does not follow verbal instructions or visual/mechanical cues.  Pupils 3+/4+ mm on the R/L, irregularly round, non-reactive.  Gaze divergent.  Unable to co-operate with visual field testing.  Resists passive eyelid opening Holds elbows in ~100 degrees of flexion, hips in about 85 degrees of flexion.  Generalized increased tonus; insufficient relaxation to test DTRs.    Plantars mute.  No overt reaction to brief irritating stimulation of either side.

## 2023-05-30 NOTE — DIETITIAN INITIAL EVALUATION ADULT - DIET TYPE
Suplena x 2/day (provides 850 kcal, 22 g protein)/low sodium/minced and moist/no concentrated potassium/60 gm protein/consistent carbohydrate (evening snack)

## 2023-05-30 NOTE — PROGRESS NOTE ADULT - ASSESSMENT
83 yo male w/ pmhx of CKD, DM, HTN presented to the ED BIBEMS 2/2 due to fall.    Hyperkalemia  - s/p temporizing measures  - nephro following  - Started lokelma    Plan: Right hip femoral fracture due to fall. Had right hip surgery. Stable in bed.  CT chest no pneumonia.  Stop all ABX.   WBAT.     COVID- 19 :  No evidence of Hypoxia, no treatment needed.     Elevated Troponin; continue to trend, unable to assess if any chest pain, patient is currently confused. TTE decreased EF 40 %. EKG notes inferior and septal MI. CHF, euvolemic.      DM: stable, hold all meds. SSC.   Acceptable today around 195 off Lantus.      Essential HTN: resume home medication lopressor, lipitor and Imdur.  Hydralazine, and Norvasc.    Only on Amiodarone now.         Renal: Creatinine 4.60. Renal consult was called, Dr. Cook will see.  Creatinine one year ago was 4.10.     Stable on tele, some bradycardia.  PT eval.  Changed to PO bicarb.     IV Heparin for now. Tele shows second degree HB but rate is 60-80, then mostly A.fib rate controlled.   Patient not willing to have PPM here, wants to speak to outside Cardiologist.     Discharge to rehab today.   Family members states patient does not want a PPM.    83 yo male w/ pmhx of CKD, DM, HTN presented to the ED BIBEMS 2/2 due to fall.    Hyperkalemia  - s/p temporizing measures  - nephro following  - Started lokelma    Plan: Right hip femoral fracture due to fall. Had right hip surgery. Stable in bed.  CT chest no pneumonia.  Stop all ABX.   WBAT.     COVID- 19 :  No evidence of Hypoxia, no treatment needed.     Elevated Troponin; continue to trend, unable to assess if any chest pain, patient is currently confused. TTE decreased EF 40 %. EKG notes inferior and septal MI. CHF, euvolemic.      DM: stable, hold all meds. SSC.   Acceptable today around 195 off Lantus.      Essential HTN: resume home medication lopressor, lipitor and Imdur.  Hydralazine, and Norvasc.    Only on Amiodarone now.         Renal: Creatinine 4.60. Renal consult was called, Dr. Cook will see.  Creatinine one year ago was 4.10.     Stable on tele, some bradycardia.  PT eval.  Changed to PO bicarb.     IV Heparin for now. Tele shows second degree HB but rate is 60-80, then mostly A.fib rate controlled.   Patient not willing to have PPM here, wants to speak to outside Cardiologist.   Family members states patient does not want a PPM.

## 2023-05-30 NOTE — DIETITIAN INITIAL EVALUATION ADULT - ENERGY INTAKE
RN reports pt consumed oatmeal and juice this morning (05/30). PCA agrees for pt to receive Suplena x 2/day (provides 850 kcal, 22 g protein)

## 2023-05-30 NOTE — DIETITIAN INITIAL EVALUATION ADULT - OTHER INFO
Pt asleep at time of visit. On 1:1 observation for agitation, anger, restlessness as per chart.    Pt with T2DM. Insulin PTA per H&P. HbA1c 6.8% in February 2023. New HbA1c result pending this admission.    Weight history per previous RD note (02/26/2023): 77.1kg. Weight loss as noted below.    RD remains available.

## 2023-05-30 NOTE — CONSULT NOTE ADULT - ASSESSMENT
To be completed     Dementia - unspecified duration.    Non-verbal at this time.     Progressively worsening cognition by Hx.    Malnutrition.    Cachexia.      RECOMMENDATIONS    B12, folate, methylmalonic acid, homocysteine, TSH, 25-OH vit D level, ESR, CRP.    Routine EEG.      Roman Newton M.D.   - Department of Neurology  [place] [facility]         Dementia - unspecified duration.    Non-verbal at this time.     Progressively worsening cognition by Hx.    Malnutrition.    Cachexia.      RECOMMENDATIONS    B12, folate, methylmalonic acid, homocysteine, TSH, 25-OH vit D level, ESR, CRP.    Routine EEG.      Roman Newton M.D.   - Department of Neurology  [place] [facility]

## 2023-05-30 NOTE — DIETITIAN INITIAL EVALUATION ADULT - PERTINENT MEDS FT
MEDICATIONS  (STANDING):  aMIOdarone    Tablet 200 milliGRAM(s) Oral daily  apixaban 2.5 milliGRAM(s) Oral every 12 hours  aspirin enteric coated 81 milliGRAM(s) Oral daily  atorvastatin 40 milliGRAM(s) Oral at bedtime  brimonidine 0.2% Ophthalmic Solution 1 Drop(s) Both EYES two times a day  dorzolamide 2%/timolol 0.5% Ophthalmic Solution 1 Drop(s) Both EYES two times a day  hydrALAZINE 25 milliGRAM(s) Oral three times a day  isosorbide   mononitrate ER Tablet (IMDUR) 60 milliGRAM(s) Oral daily  multivitamin/minerals 1 Tablet(s) Oral daily  OLANZapine 5 milliGRAM(s) Oral <User Schedule>  pantoprazole    Tablet 40 milliGRAM(s) Oral before breakfast  polyethylene glycol 3350 17 Gram(s) Oral two times a day  senna 2 Tablet(s) Oral at bedtime  sodium bicarbonate 650 milliGRAM(s) Oral three times a day    MEDICATIONS  (PRN):  OLANZapine 2.5 milliGRAM(s) Oral every 12 hours PRN agitation

## 2023-05-30 NOTE — CONSULT NOTE ADULT - SUBJECTIVE AND OBJECTIVE BOX
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  Patient has not been doing well since February into March status post surgery however worsening cognitively decline started occurring around April as per son.  Patient currently seems confused unable to tell time or place.  Per ER discussions, family members do not want HD as a treatment option.    (29 May 2023 20:30)   "    PAST MEDICAL & SURGICAL HISTORY:  DM (diabetes mellitus)      CHF (congestive heart failure)        FAMILY HISTORY:    Allergies    No Known Allergies    Intolerances      Home Medications:  acetaminophen 325 mg oral tablet: 3 tab(s) orally every 12 hours, As Needed (29 May 2023 18:20)  amiodarone 200 mg oral tablet: 1 tab(s) orally once a day (29 May 2023 18:20)  apixaban 2.5 mg oral tablet: 1 tab(s) orally every 12 hours (29 May 2023 18:20)  atorvastatin 40 mg oral tablet: 1 tab(s) orally once a day (at bedtime) (29 May 2023 18:20)  brimonidine 0.2% ophthalmic solution: 1 drop(s) to each affected eye 2 times a day (29 May 2023 18:20)  dorzolamide-timolol 2%-0.5% preservative-free ophthalmic solution: 1 drop(s) to each affected eye 2 times a day (01 Mar 2023 15:30)  hydrALAZINE 25 mg oral tablet: 3 tab(s) orally 3 times a day (2023 11:54)  insulin lispro 100 units/mL injectable solution: injectable once a day (at bedtime)  FS ac and HS   0 Unit(s) if Glucose 0 - 250  1 Unit(s) if Glucose 251 - 300  2 Unit(s) if Glucose 301 - 350  3 Unit(s) if Glucose 351 - 400  4 Unit(s) if Glucose Greater Than 400 (01 Mar 2023 15:36)  insulin lispro 100 units/mL injectable solution: injectable 3 times a day (before meals)  FS AC and HS   1 Unit(s) if Glucose 151 - 200  2 Unit(s) if Glucose 201 - 250  3 Unit(s) if Glucose 251 - 300  4 Unit(s) if Glucose 301 - 350  5 Unit(s) if Glucose 351 - 400  6 Unit(s) if Glucose Greater Than 400 (01 Mar 2023 15:38)  isosorbide mononitrate 60 mg oral tablet, extended release: 1 tab(s) orally once a day (2023 11:54)  Multiple Vitamins oral tablet: 1 tab(s) orally once a day (2023 11:54)  pantoprazole 40 mg oral delayed release tablet: 1 tab(s) orally once a day (before a meal) (2023 11:54)  polyethylene glycol 3350 oral powder for reconstitution: 17 gram(s) orally once a day (at bedtime)  Hold for loose stool  (01 Mar 2023 15:30)  prednisoLONE acetate 1% ophthalmic suspension: 1 drop(s) to each affected eye 3 times a day (01 Mar 2023 15:30)  senna leaf extract oral tablet: 2 tab(s) orally once a day (at bedtime)  Hold for loose stool  (01 Mar 2023 15:30)  sodium bicarbonate 650 mg oral tablet: 2 tab(s) orally 2 times a day (2023 11:54)  traMADol 50 mg oral tablet: 0.5 tab(s) orally every 6 hours, As needed, Mild Pain (1 - 3)  Hold for sedation  (01 Mar 2023 15:30)    MEDICATIONS  (STANDING):  aMIOdarone    Tablet 200 milliGRAM(s) Oral daily  apixaban 2.5 milliGRAM(s) Oral every 12 hours  aspirin enteric coated 81 milliGRAM(s) Oral daily  atorvastatin 40 milliGRAM(s) Oral at bedtime  brimonidine 0.2% Ophthalmic Solution 1 Drop(s) Both EYES two times a day  dorzolamide 2%/timolol 0.5% Ophthalmic Solution 1 Drop(s) Both EYES two times a day  hydrALAZINE 25 milliGRAM(s) Oral three times a day  isosorbide   mononitrate ER Tablet (IMDUR) 60 milliGRAM(s) Oral daily  multivitamin/minerals 1 Tablet(s) Oral daily  OLANZapine 5 milliGRAM(s) Oral <User Schedule>  pantoprazole    Tablet 40 milliGRAM(s) Oral before breakfast  polyethylene glycol 3350 17 Gram(s) Oral two times a day  senna 2 Tablet(s) Oral at bedtime  sodium bicarbonate 650 milliGRAM(s) Oral three times a day  sodium zirconium cyclosilicate 10 Gram(s) Oral every 8 hours    MEDICATIONS  (PRN):  OLANZapine 2.5 milliGRAM(s) Oral every 12 hours PRN agitation    Vital Signs Last 24 Hrs  T(C): 36.4 (30 May 2023 11:12), Max: 37 (29 May 2023 18:05)  T(F): 97.5 (30 May 2023 11:12), Max: 98.6 (29 May 2023 18:05)  HR: 97 (30 May 2023 11:12) (72 - 104)  BP: 159/81 (30 May 2023 11:12) (124/89 - 174/98)  BP(mean): 106 (29 May 2023 18:56) (106 - 111)  RR: 18 (30 May 2023 11:12) (15 - 21)  SpO2: 98% (30 May 2023 11:12) (94% - 100%)    Parameters below as of 30 May 2023 11:12  Patient On (Oxygen Delivery Method): room air        Daily Height in cm: 172.72 (29 May 2023 17:44)    Daily     CAPILLARY BLOOD GLUCOSE      POCT Blood Glucose.: 181 mg/dL (30 May 2023 16:12)  POCT Blood Glucose.: 144 mg/dL (30 May 2023 11:25)  POCT Blood Glucose.: 94 mg/dL (30 May 2023 07:34)  POCT Blood Glucose.: 99 mg/dL (29 May 2023 21:09)  POCT Blood Glucose.: 93 mg/dL (29 May 2023 17:51)    PHYSICAL EXAM:      T(C): 36.4 (23 @ 11:12), Max: 37 (23 @ 18:05)  HR: 97 (23 @ 11:12) (72 - 104)  BP: 159/81 (23 @ 11:12) (124/89 - 174/98)  RR: 18 (23 @ 11:12) (15 - 21)  SpO2: 98% (23 @ 11:12) (94% - 100%)  Wt(kg): --  Lungs clear  Heart S1S2  Abd soft NT ND  Extremities:   tr edema                  145  |  119<H>  |  73<H>  ----------------------------<  104<H>  6.4<HH>   |  23  |  4.74<H>    Ca    9.4      30 May 2023 07:02  Phos  3.2       Mg     2.1         TPro  6.6  /  Alb  2.5<L>  /  TBili  0.5  /  DBili  x   /  AST  17  /  ALT  21  /  AlkPhos  81                            11.2   6.81  )-----------( 228      ( 30 May 2023 07:02 )             37.6     Creatinine Trend: 4.74<--, 4.86<--  Urinalysis Basic - ( 29 May 2023 18:40 )    Color: Yellow / Appearance: Clear / S.015 / pH: x  Gluc: x / Ketone: Negative  / Bili: Negative / Urobili: Negative mg/dL   Blood: x / Protein: 500 mg/dL / Nitrite: Negative   Leuk Esterase: Negative / RBC: 0-2 /HPF / WBC 0-2   Sq Epi: x / Non Sq Epi: x / Bacteria: Occasional            Assessment   CKD 5; hyperkalemia    Plan  Medical rx K with Lokelma; insulin/ D50  IVF 1/2 NS  Bladder scan   As per family, no HD treatment options considered  High risk for uremic complications and death      Stanley Haro MD

## 2023-05-31 DIAGNOSIS — F05 DELIRIUM DUE TO KNOWN PHYSIOLOGICAL CONDITION: ICD-10-CM

## 2023-05-31 DIAGNOSIS — F01.50 VASCULAR DEMENTIA, UNSPECIFIED SEVERITY, WITHOUT BEHAVIORAL DISTURBANCE, PSYCHOTIC DISTURBANCE, MOOD DISTURBANCE, AND ANXIETY: ICD-10-CM

## 2023-05-31 LAB
ALBUMIN SERPL ELPH-MCNC: 2.2 G/DL — LOW (ref 3.3–5)
ALP SERPL-CCNC: 77 U/L — SIGNIFICANT CHANGE UP (ref 40–120)
ALT FLD-CCNC: 16 U/L — SIGNIFICANT CHANGE UP (ref 12–78)
ANION GAP SERPL CALC-SCNC: 5 MMOL/L — SIGNIFICANT CHANGE UP (ref 5–17)
AST SERPL-CCNC: 10 U/L — LOW (ref 15–37)
BILIRUB SERPL-MCNC: 0.5 MG/DL — SIGNIFICANT CHANGE UP (ref 0.2–1.2)
BUN SERPL-MCNC: 68 MG/DL — HIGH (ref 7–23)
CALCIUM SERPL-MCNC: 8.6 MG/DL — SIGNIFICANT CHANGE UP (ref 8.5–10.1)
CHLORIDE SERPL-SCNC: 114 MMOL/L — HIGH (ref 96–108)
CO2 SERPL-SCNC: 20 MMOL/L — LOW (ref 22–31)
CREAT SERPL-MCNC: 5 MG/DL — HIGH (ref 0.5–1.3)
EGFR: 11 ML/MIN/1.73M2 — LOW
GLUCOSE BLDC GLUCOMTR-MCNC: 132 MG/DL — HIGH (ref 70–99)
GLUCOSE BLDC GLUCOMTR-MCNC: 183 MG/DL — HIGH (ref 70–99)
GLUCOSE SERPL-MCNC: 126 MG/DL — HIGH (ref 70–99)
HCT VFR BLD CALC: 34.2 % — LOW (ref 39–50)
HGB BLD-MCNC: 10.2 G/DL — LOW (ref 13–17)
MCHC RBC-ENTMCNC: 26.9 PG — LOW (ref 27–34)
MCHC RBC-ENTMCNC: 29.8 G/DL — LOW (ref 32–36)
MCV RBC AUTO: 90.2 FL — SIGNIFICANT CHANGE UP (ref 80–100)
NRBC # BLD: 0 /100 WBCS — SIGNIFICANT CHANGE UP (ref 0–0)
PLATELET # BLD AUTO: 217 K/UL — SIGNIFICANT CHANGE UP (ref 150–400)
POTASSIUM SERPL-MCNC: 5.1 MMOL/L — SIGNIFICANT CHANGE UP (ref 3.5–5.3)
POTASSIUM SERPL-SCNC: 5.1 MMOL/L — SIGNIFICANT CHANGE UP (ref 3.5–5.3)
PROT SERPL-MCNC: 6.1 GM/DL — SIGNIFICANT CHANGE UP (ref 6–8.3)
RBC # BLD: 3.79 M/UL — LOW (ref 4.2–5.8)
RBC # FLD: 14.8 % — HIGH (ref 10.3–14.5)
SODIUM SERPL-SCNC: 139 MMOL/L — SIGNIFICANT CHANGE UP (ref 135–145)
WBC # BLD: 7.7 K/UL — SIGNIFICANT CHANGE UP (ref 3.8–10.5)
WBC # FLD AUTO: 7.7 K/UL — SIGNIFICANT CHANGE UP (ref 3.8–10.5)

## 2023-05-31 PROCEDURE — 99222 1ST HOSP IP/OBS MODERATE 55: CPT

## 2023-05-31 PROCEDURE — 93010 ELECTROCARDIOGRAM REPORT: CPT

## 2023-05-31 PROCEDURE — 99233 SBSQ HOSP IP/OBS HIGH 50: CPT

## 2023-05-31 RX ADMIN — AMIODARONE HYDROCHLORIDE 200 MILLIGRAM(S): 400 TABLET ORAL at 06:27

## 2023-05-31 RX ADMIN — BRIMONIDINE TARTRATE 1 DROP(S): 2 SOLUTION/ DROPS OPHTHALMIC at 21:13

## 2023-05-31 RX ADMIN — POLYETHYLENE GLYCOL 3350 17 GRAM(S): 17 POWDER, FOR SOLUTION ORAL at 06:26

## 2023-05-31 RX ADMIN — Medication 81 MILLIGRAM(S): at 12:08

## 2023-05-31 RX ADMIN — SODIUM ZIRCONIUM CYCLOSILICATE 10 GRAM(S): 10 POWDER, FOR SUSPENSION ORAL at 07:08

## 2023-05-31 RX ADMIN — Medication 650 MILLIGRAM(S): at 06:27

## 2023-05-31 RX ADMIN — Medication 25 MILLIGRAM(S): at 06:27

## 2023-05-31 RX ADMIN — BRIMONIDINE TARTRATE 1 DROP(S): 2 SOLUTION/ DROPS OPHTHALMIC at 06:27

## 2023-05-31 RX ADMIN — PANTOPRAZOLE SODIUM 40 MILLIGRAM(S): 20 TABLET, DELAYED RELEASE ORAL at 06:27

## 2023-05-31 RX ADMIN — APIXABAN 2.5 MILLIGRAM(S): 2.5 TABLET, FILM COATED ORAL at 06:26

## 2023-05-31 RX ADMIN — Medication 2 MILLIGRAM(S): at 15:49

## 2023-05-31 RX ADMIN — DORZOLAMIDE HYDROCHLORIDE TIMOLOL MALEATE 1 DROP(S): 20; 5 SOLUTION/ DROPS OPHTHALMIC at 21:13

## 2023-05-31 RX ADMIN — ISOSORBIDE MONONITRATE 60 MILLIGRAM(S): 60 TABLET, EXTENDED RELEASE ORAL at 12:08

## 2023-05-31 RX ADMIN — DORZOLAMIDE HYDROCHLORIDE TIMOLOL MALEATE 1 DROP(S): 20; 5 SOLUTION/ DROPS OPHTHALMIC at 06:27

## 2023-05-31 RX ADMIN — OLANZAPINE 2.5 MILLIGRAM(S): 15 TABLET, FILM COATED ORAL at 12:08

## 2023-05-31 NOTE — CHART NOTE - NSCHARTNOTEFT_GEN_A_CORE
Chart reviewed and new orders appreciated. Patient had been attempted to be evaluated. DPT Rubens contacted prescribing physician, Dr. Maravilla -- whom agreed to discontinue new orders as PT continues to attempt efforts. Patient remains combative and non-participative. Will continue to attempt, no new orders required.

## 2023-05-31 NOTE — BH CONSULTATION LIAISON ASSESSMENT NOTE - NSBHCHARTREVIEWVS_PSY_A_CORE FT
carried Vital Signs Last 24 Hrs  T(C): 36.2 (31 May 2023 04:27), Max: 36.2 (31 May 2023 04:27)  T(F): 97.2 (31 May 2023 04:27), Max: 97.2 (31 May 2023 04:27)  HR: 100 (31 May 2023 11:18) (78 - 100)  BP: 160/93 (31 May 2023 11:18) (143/85 - 165/88)  BP(mean): --  RR: 20 (31 May 2023 11:18) (19 - 20)  SpO2: 100% (31 May 2023 11:18) (97% - 100%)    Parameters below as of 31 May 2023 11:18  Patient On (Oxygen Delivery Method): room air

## 2023-05-31 NOTE — PROVIDER CONTACT NOTE (MEDICATION) - SITUATION
Patient given 2mg Ativan at 15:49, now sedated unable to tolerate PO intake. Patient unable to take PO doses of eliqus, lipitor, Hydalazine, zyprexa, senna, sodium bicarb.

## 2023-05-31 NOTE — PROGRESS NOTE ADULT - SUBJECTIVE AND OBJECTIVE BOX
[facility] NEPHROLOGY SERVICES, [facility]  NEPHROLOGY AND HYPERTENSION  [address]  [address]  [place] [address]  [phone]    MD JOSE CAMERON MD YELENA ROSENBERG, MD BINNY KOSHY, MD CHRISTOPHER CAPUTO, MD EDWARD BOVER, MD          Patient events noted  No distress  confused;     MEDICATIONS  (STANDING):  aMIOdarone    Tablet 200 milliGRAM(s) Oral daily  apixaban 2.5 milliGRAM(s) Oral every 12 hours  aspirin enteric coated 81 milliGRAM(s) Oral daily  atorvastatin 40 milliGRAM(s) Oral at bedtime  brimonidine 0.2% Ophthalmic Solution 1 Drop(s) Both EYES two times a day  dorzolamide 2%/timolol 0.5% Ophthalmic Solution 1 Drop(s) Both EYES two times a day  hydrALAZINE 25 milliGRAM(s) Oral three times a day  isosorbide   mononitrate ER Tablet (IMDUR) 60 milliGRAM(s) Oral daily  multivitamin/minerals 1 Tablet(s) Oral daily  OLANZapine 5 milliGRAM(s) Oral <User Schedule>  pantoprazole    Tablet 40 milliGRAM(s) Oral before breakfast  polyethylene glycol 3350 17 Gram(s) Oral two times a day  senna 2 Tablet(s) Oral at bedtime  sodium bicarbonate 650 milliGRAM(s) Oral three times a day    MEDICATIONS  (PRN):  LORazepam   Injectable 2 milliGRAM(s) IV Push every 6 hours PRN Agitation  OLANZapine 2.5 milliGRAM(s) Oral every 12 hours PRN agitation      05-31-23 @ 07:01  -  05-31-23 @ 22:19  --------------------------------------------------------  IN: 420 mL / OUT: 0 mL / NET: 420 mL      PHYSICAL EXAM:      T(C): 36.4 (05-31-23 @ 21:03), Max: 36.4 (05-31-23 @ 16:51)  HR: 80 (05-31-23 @ 21:03) (78 - 100)  BP: 130/64 (05-31-23 @ 21:03) (112/72 - 165/88)  RR: 18 (05-31-23 @ 21:03) (18 - 20)  SpO2: 97% (05-31-23 @ 21:03) (97% - 100%)  Wt(kg): --  Lungs clear  Heart S1S2  Abd soft NT ND  Extremities:   tr edema                                    10.2   7.70  )-----------( 217      ( 31 May 2023 05:50 )             34.2     05-31    139  |  114<H>  |  68<H>  ----------------------------<  126<H>  5.1   |  20<L>  |  5.00<H>    Ca    8.6      31 May 2023 05:50    TPro  6.1  /  Alb  2.2<L>  /  TBili  0.5  /  DBili  x   /  AST  10<L>  /  ALT  16  /  AlkPhos  77  05-31      LIVER FUNCTIONS - ( 31 May 2023 05:50 )  Alb: 2.2 g/dL / Pro: 6.1 gm/dL / ALK PHOS: 77 U/L / ALT: 16 U/L / AST: 10 U/L / GGT: x           Creatinine Trend: 5.00<--, 5.09<--, 4.74<--, 4.86<--          Assessment   CKD 5; hyperkalemia  Multifactorial change in MS;  I don't suspect uremic encephalopathy as main culprit    Plan  IVF 1/2 NS if patient tolerates  As per family, no HD treatment options considered  Requesting hospice care  High risk for uremic complications and death as renal disease advances        Stanley Haro MD

## 2023-05-31 NOTE — BH CONSULTATION LIAISON ASSESSMENT NOTE - NSBHCONSULTRECOMMENDOTHER_PSY_A_CORE FT
- end stage renal disease / HD not an option in this case so kidney status will further deteriorate with increasing uremic load   - severe cardiac baseline functioning with need for pacemaker, CHF, TTE decreased EF 40 %; EKG notes inferior and septal MI 02/23  - chronic Dementia, CVAs (Chronic lacunar infarct left side of the ronni. Chronic medial left occipital infarct) making baseline mental status impaired with chronic disorientation, confusion, inability to verbalize needs, needs full ADL assistance   - Palliative Care consult recommended at this point given guarded prognosis

## 2023-05-31 NOTE — BH CONSULTATION LIAISON ASSESSMENT NOTE - HPI (INCLUDE ILLNESS QUALITY, SEVERITY, DURATION, TIMING, CONTEXT, MODIFYING FACTORS, ASSOCIATED SIGNS AND SYMPTOMS)
84 yo male, noncaregiver, retired, domiciled with family, with chronic Dementia, CVAs (Chronic lacunar infarct left side of the ronni. Chronic medial left occipital infarct); with CKD (with recommendation of imminent dialysis as per Nephrology february of 2023), DM, HTN, right hip femoral fracture due to fall 2/23 with R hemiarthroplasty on 2/19/23 at [facility], CHF,  TTE decreased EF 40 %; EKG notes inferior and septal MI 02/23, seen by [facility] Cardiology who recommended PPM for PAF/ mobitz 1 and heart block 2/23. Patient admitted to VS on 5/29/23 from home for increased confusion x1 month as per family.  Patient has not been doing well since fifth February into March status post surgery however cognitively decline started occurring around April as per son. Seen by Nephrology - family declining HD at this time. Seen by Neurology - progressing dementia.  84 yo male, noncaregiver, retired, domiciled with family, with chronic Dementia, CVAs (Chronic lacunar infarct left side of the ronni. Chronic medial left occipital infarct); with CKD (with recommendation of imminent dialysis as per Nephrology february of 2023), DM, HTN, right hip femoral fracture due to fall 2/23 with R hemiarthroplasty on 2/19/23 at [facility], CHF,  TTE decreased EF 40 %; EKG notes inferior and septal MI 02/23, seen by [facility] Cardiology who recommended PPM for PAF/ mobitz 1 and heart block 2/23. Patient admitted to VS on 5/29/23 from home for increased confusion x1 month as per family.  Patient has not been doing well since fifth February into March status post surgery however cognitively decline started occurring around April as per son. Seen by Nephrology - family declining HD at this time. Seen by Neurology - progressing dementia.     EXAM: awake, alert, confused, disoriented, does not engage in verbal or nonverbal communication, blankly stares at Writer. Does not smile or wave back, does not respond to social cues. Semi-lying, sitting askew in bed, trying to fling his legs over the rails. Staff at bedside trying to redirect Patient without success. STAT Ativan 2mg IVP x 1 dose for safety ordered.

## 2023-05-31 NOTE — BH CONSULTATION LIAISON ASSESSMENT NOTE - SUMMARY
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

## 2023-05-31 NOTE — PROGRESS NOTE ADULT - ASSESSMENT
83 yo male w/ pmhx of CKD, DM, HTN presented to the ED BIBEMS 2/2 due to fall.    Hyperkalemia  - s/p temporizing measures  - nephro following  - now resolved    CKD5  - Appreciate nephro recs  - started 1/2 NS  - no plans for HD    Plan: Right hip femoral fracture due to fall. Had right hip surgery. Stable in bed.  CT chest no pneumonia.  Stop all ABX.   WBAT.     COVID- 19 :  No evidence of Hypoxia, no treatment needed.     Elevated Troponin; continue to trend, unable to assess if any chest pain, patient is currently confused. TTE decreased EF 40 %. EKG notes inferior and septal MI. CHF, euvolemic.      DM: stable, hold all meds. SSC.   Acceptable today around 195 off Lantus.      Essential HTN: resume home medication lopressor, lipitor and Imdur.  Hydralazine, and Norvasc.    Only on Amiodarone now.         Renal: Creatinine 4.60. Renal consult was called, Dr. Cook will see.  Creatinine one year ago was 4.10.     Stable on tele, some bradycardia.  PT eval.  Changed to PO bicarb.     IV Heparin for now. Tele shows second degree HB but rate is 60-80, then mostly A.fib rate controlled.   Patient not willing to have PPM here, wants to speak to outside Cardiologist.   Family members states patient does not want a PPM.       PT eval pending.

## 2023-05-31 NOTE — PROGRESS NOTE ADULT - SUBJECTIVE AND OBJECTIVE BOX
Patient is a 83y old  Male who presents with a chief complaint of AMS (30 May 2023 18:03)      INTERVAL HPI/OVERNIGHT EVENTS: Patient was agitated overnight. HD stable.     MEDICATIONS  (STANDING):  aMIOdarone    Tablet 200 milliGRAM(s) Oral daily  apixaban 2.5 milliGRAM(s) Oral every 12 hours  aspirin enteric coated 81 milliGRAM(s) Oral daily  atorvastatin 40 milliGRAM(s) Oral at bedtime  brimonidine 0.2% Ophthalmic Solution 1 Drop(s) Both EYES two times a day  dorzolamide 2%/timolol 0.5% Ophthalmic Solution 1 Drop(s) Both EYES two times a day  hydrALAZINE 25 milliGRAM(s) Oral three times a day  isosorbide   mononitrate ER Tablet (IMDUR) 60 milliGRAM(s) Oral daily  multivitamin/minerals 1 Tablet(s) Oral daily  OLANZapine 5 milliGRAM(s) Oral <User Schedule>  pantoprazole    Tablet 40 milliGRAM(s) Oral before breakfast  polyethylene glycol 3350 17 Gram(s) Oral two times a day  senna 2 Tablet(s) Oral at bedtime  sodium bicarbonate 650 milliGRAM(s) Oral three times a day    MEDICATIONS  (PRN):  OLANZapine 2.5 milliGRAM(s) Oral every 12 hours PRN agitation      Allergies    No Known Allergies    Intolerances        REVIEW OF SYSTEMS: all negative with exception of above    Vital Signs Last 24 Hrs  T(C): 36.2 (31 May 2023 04:27), Max: 36.2 (31 May 2023 04:27)  T(F): 97.2 (31 May 2023 04:27), Max: 97.2 (31 May 2023 04:27)  HR: 100 (31 May 2023 11:18) (78 - 100)  BP: 160/93 (31 May 2023 11:18) (143/85 - 165/88)  BP(mean): --  RR: 20 (31 May 2023 11:18) (19 - 20)  SpO2: 100% (31 May 2023 11:18) (97% - 100%)    Parameters below as of 31 May 2023 11:18  Patient On (Oxygen Delivery Method): room air        PHYSICAL EXAM:  GENERAL: NAD, sitting comformtably  ENMT: mucous membranes moist  NECK: supple, No JVD  CHEST/LUNG: clear to auscultation bilaterally; no rales, rhonchi, or wheezing b/l  HEART: normal S1, S2  ABDOMEN: BS+, soft, ND, NT   EXTREMITIES:  pulses palpable; no clubbing, cyanosis, or edema b/l LEs  NEURO: awake, alert, interactive; moves all extremities    LABS:                        10.2   7.70  )-----------( 217      ( 31 May 2023 05:50 )             34.2         139  |  114<H>  |  68<H>  ----------------------------<  126<H>  5.1   |  20<L>  |  5.00<H>    Ca    8.6      31 May 2023 05:50  Phos  3.2       Mg     2.1         TPro  6.1  /  Alb  2.2<L>  /  TBili  0.5  /  DBili  x   /  AST  10<L>  /  ALT  16  /  AlkPhos  77      PT/INR - ( 29 May 2023 18:40 )   PT: 13.7 sec;   INR: 1.14 ratio         PTT - ( 29 May 2023 18:40 )  PTT:33.5 sec  Urinalysis Basic - ( 29 May 2023 18:40 )    Color: Yellow / Appearance: Clear / S.015 / pH: x  Gluc: x / Ketone: Negative  / Bili: Negative / Urobili: Negative mg/dL   Blood: x / Protein: 500 mg/dL / Nitrite: Negative   Leuk Esterase: Negative / RBC: 0-2 /HPF / WBC 0-2   Sq Epi: x / Non Sq Epi: x / Bacteria: Occasional      CAPILLARY BLOOD GLUCOSE      POCT Blood Glucose.: 132 mg/dL (31 May 2023 08:03)  POCT Blood Glucose.: 163 mg/dL (30 May 2023 21:48)  POCT Blood Glucose.: 181 mg/dL (30 May 2023 16:12)      RADIOLOGY & ADDITIONAL TESTS:    Imaging Personally Reviewed:  [ ] YES  [ ] NO    Consultant(s) Notes Reviewed:  [ ] YES  [ ] NO    Care Discussed with Consultants/Other Providers [ ] YES  [ ] NO

## 2023-06-01 DIAGNOSIS — E43 UNSPECIFIED SEVERE PROTEIN-CALORIE MALNUTRITION: ICD-10-CM

## 2023-06-01 DIAGNOSIS — N18.5 CHRONIC KIDNEY DISEASE, STAGE 5: ICD-10-CM

## 2023-06-01 DIAGNOSIS — Z51.5 ENCOUNTER FOR PALLIATIVE CARE: ICD-10-CM

## 2023-06-01 DIAGNOSIS — R53.2 FUNCTIONAL QUADRIPLEGIA: ICD-10-CM

## 2023-06-01 DIAGNOSIS — N17.9 ACUTE KIDNEY FAILURE, UNSPECIFIED: ICD-10-CM

## 2023-06-01 LAB
24R-OH-CALCIDIOL SERPL-MCNC: 9.5 NG/ML — LOW (ref 30–80)
ALBUMIN SERPL ELPH-MCNC: 2.1 G/DL — LOW (ref 3.3–5)
ALP SERPL-CCNC: 74 U/L — SIGNIFICANT CHANGE UP (ref 40–120)
ALT FLD-CCNC: 15 U/L — SIGNIFICANT CHANGE UP (ref 12–78)
ANION GAP SERPL CALC-SCNC: 8 MMOL/L — SIGNIFICANT CHANGE UP (ref 5–17)
AST SERPL-CCNC: 15 U/L — SIGNIFICANT CHANGE UP (ref 15–37)
BILIRUB SERPL-MCNC: 0.5 MG/DL — SIGNIFICANT CHANGE UP (ref 0.2–1.2)
BUN SERPL-MCNC: 64 MG/DL — HIGH (ref 7–23)
CALCIUM SERPL-MCNC: 8.4 MG/DL — LOW (ref 8.5–10.1)
CHLORIDE SERPL-SCNC: 114 MMOL/L — HIGH (ref 96–108)
CO2 SERPL-SCNC: 20 MMOL/L — LOW (ref 22–31)
CREAT SERPL-MCNC: 5.12 MG/DL — HIGH (ref 0.5–1.3)
CRP SERPL-MCNC: <3 MG/L — SIGNIFICANT CHANGE UP
EGFR: 11 ML/MIN/1.73M2 — LOW
ERYTHROCYTE [SEDIMENTATION RATE] IN BLOOD: 27 MM/HR — HIGH (ref 0–20)
FOLATE SERPL-MCNC: 7 NG/ML — SIGNIFICANT CHANGE UP
GLUCOSE BLDC GLUCOMTR-MCNC: 143 MG/DL — HIGH (ref 70–99)
GLUCOSE BLDC GLUCOMTR-MCNC: 203 MG/DL — HIGH (ref 70–99)
GLUCOSE SERPL-MCNC: 147 MG/DL — HIGH (ref 70–99)
HCT VFR BLD CALC: 38 % — LOW (ref 39–50)
HCYS SERPL-MCNC: 27.5 UMOL/L — HIGH
HGB BLD-MCNC: 11.7 G/DL — LOW (ref 13–17)
MCHC RBC-ENTMCNC: 26.9 PG — LOW (ref 27–34)
MCHC RBC-ENTMCNC: 30.8 G/DL — LOW (ref 32–36)
MCV RBC AUTO: 87.4 FL — SIGNIFICANT CHANGE UP (ref 80–100)
NRBC # BLD: 0 /100 WBCS — SIGNIFICANT CHANGE UP (ref 0–0)
PLATELET # BLD AUTO: 218 K/UL — SIGNIFICANT CHANGE UP (ref 150–400)
POTASSIUM SERPL-MCNC: 5 MMOL/L — SIGNIFICANT CHANGE UP (ref 3.5–5.3)
POTASSIUM SERPL-SCNC: 5 MMOL/L — SIGNIFICANT CHANGE UP (ref 3.5–5.3)
PROT SERPL-MCNC: 5.9 GM/DL — LOW (ref 6–8.3)
RBC # BLD: 4.35 M/UL — SIGNIFICANT CHANGE UP (ref 4.2–5.8)
RBC # FLD: 15.1 % — HIGH (ref 10.3–14.5)
SODIUM SERPL-SCNC: 142 MMOL/L — SIGNIFICANT CHANGE UP (ref 135–145)
TSH SERPL-MCNC: 1.45 UIU/ML — SIGNIFICANT CHANGE UP (ref 0.36–3.74)
VIT B12 SERPL-MCNC: 924 PG/ML — SIGNIFICANT CHANGE UP (ref 232–1245)
WBC # BLD: 9.96 K/UL — SIGNIFICANT CHANGE UP (ref 3.8–10.5)
WBC # FLD AUTO: 9.96 K/UL — SIGNIFICANT CHANGE UP (ref 3.8–10.5)

## 2023-06-01 PROCEDURE — 99232 SBSQ HOSP IP/OBS MODERATE 35: CPT

## 2023-06-01 PROCEDURE — 99223 1ST HOSP IP/OBS HIGH 75: CPT

## 2023-06-01 PROCEDURE — 99231 SBSQ HOSP IP/OBS SF/LOW 25: CPT

## 2023-06-01 RX ORDER — OLANZAPINE 15 MG/1
2.5 TABLET, FILM COATED ORAL
Refills: 0 | Status: DISCONTINUED | OUTPATIENT
Start: 2023-06-01 | End: 2023-06-05

## 2023-06-01 RX ADMIN — OLANZAPINE 2.5 MILLIGRAM(S): 15 TABLET, FILM COATED ORAL at 15:37

## 2023-06-01 RX ADMIN — ATORVASTATIN CALCIUM 40 MILLIGRAM(S): 80 TABLET, FILM COATED ORAL at 22:42

## 2023-06-01 RX ADMIN — Medication 25 MILLIGRAM(S): at 05:21

## 2023-06-01 RX ADMIN — OLANZAPINE 2.5 MILLIGRAM(S): 15 TABLET, FILM COATED ORAL at 17:18

## 2023-06-01 RX ADMIN — Medication 650 MILLIGRAM(S): at 14:30

## 2023-06-01 RX ADMIN — POLYETHYLENE GLYCOL 3350 17 GRAM(S): 17 POWDER, FOR SOLUTION ORAL at 17:32

## 2023-06-01 RX ADMIN — Medication 650 MILLIGRAM(S): at 22:43

## 2023-06-01 RX ADMIN — BRIMONIDINE TARTRATE 1 DROP(S): 2 SOLUTION/ DROPS OPHTHALMIC at 17:23

## 2023-06-01 RX ADMIN — AMIODARONE HYDROCHLORIDE 200 MILLIGRAM(S): 400 TABLET ORAL at 05:21

## 2023-06-01 RX ADMIN — Medication 1 TABLET(S): at 11:27

## 2023-06-01 RX ADMIN — BRIMONIDINE TARTRATE 1 DROP(S): 2 SOLUTION/ DROPS OPHTHALMIC at 05:22

## 2023-06-01 RX ADMIN — Medication 25 MILLIGRAM(S): at 14:30

## 2023-06-01 RX ADMIN — APIXABAN 2.5 MILLIGRAM(S): 2.5 TABLET, FILM COATED ORAL at 05:21

## 2023-06-01 RX ADMIN — PANTOPRAZOLE SODIUM 40 MILLIGRAM(S): 20 TABLET, DELAYED RELEASE ORAL at 06:40

## 2023-06-01 RX ADMIN — SENNA PLUS 2 TABLET(S): 8.6 TABLET ORAL at 22:42

## 2023-06-01 RX ADMIN — POLYETHYLENE GLYCOL 3350 17 GRAM(S): 17 POWDER, FOR SOLUTION ORAL at 05:22

## 2023-06-01 RX ADMIN — OLANZAPINE 5 MILLIGRAM(S): 15 TABLET, FILM COATED ORAL at 22:43

## 2023-06-01 RX ADMIN — ISOSORBIDE MONONITRATE 60 MILLIGRAM(S): 60 TABLET, EXTENDED RELEASE ORAL at 11:27

## 2023-06-01 RX ADMIN — APIXABAN 2.5 MILLIGRAM(S): 2.5 TABLET, FILM COATED ORAL at 17:23

## 2023-06-01 RX ADMIN — DORZOLAMIDE HYDROCHLORIDE TIMOLOL MALEATE 1 DROP(S): 20; 5 SOLUTION/ DROPS OPHTHALMIC at 17:23

## 2023-06-01 RX ADMIN — Medication 81 MILLIGRAM(S): at 11:27

## 2023-06-01 RX ADMIN — DORZOLAMIDE HYDROCHLORIDE TIMOLOL MALEATE 1 DROP(S): 20; 5 SOLUTION/ DROPS OPHTHALMIC at 05:22

## 2023-06-01 RX ADMIN — Medication 650 MILLIGRAM(S): at 05:21

## 2023-06-01 RX ADMIN — Medication 25 MILLIGRAM(S): at 22:43

## 2023-06-01 NOTE — PROGRESS NOTE ADULT - ASSESSMENT
Severe dementia.      Cachexia.    Malnutrition.      Elevated homocysteine; possible B12 and/or folate deficiency (the B12 level could be a false hi  The assay for B12 does not measure B12 level directly.  False normal and false elevations (to or above the normal range) occur with pernicious anemia due to intrinsic factor antibodies, which may or may not be detected by antibody tests.  B12-like compounds of vegetable origin (a problem with vegans) without cobalamin activity also can lead to falsely normal or elevated determinations.  Methylmalonic acid (MMA) and homocysteine (Hcy) determinations are necessary to elucidate what is happening.    Irrespective of B12 and folate levels:       high Hcy w normal MMA implies folate deficiency;        high MMA and Hcy implies B12 deficiency +/- folate deficiency.    Awaiting EEG and MMA level.

## 2023-06-01 NOTE — CONSULT NOTE ADULT - PROBLEM SELECTOR RECOMMENDATION 2
CKD 5, renal following, s/p IVF. Appears close to baseline from 2/23. Per son, patient had refused HD in the past, no plans for HD at this time, and pt poor candidate given comorbidities. Hospice eligible, son in agreement for home hospice.

## 2023-06-01 NOTE — CONSULT NOTE ADULT - PROBLEM SELECTOR RECOMMENDATION 9
advancing, CT shows old infarcts, min verbal, nonambulatory. With likely superimposed delirium, behavioral disturbance. Neuro and psych following. On zyprexa, ativan prn. EEG pending.     Hospice eligible, son in agreement for home hospice.

## 2023-06-01 NOTE — PHYSICAL THERAPY INITIAL EVALUATION ADULT - PERTINENT HX OF CURRENT PROBLEM, REHAB EVAL
Patient is an 84 y/o male admitted to [facility] due to AMS. CT of head showed no evidence of an acute intracranial hemorrhage, midline shift, or hydrocephalus. Chronic left occipital infarct. Chronic lacunar infarct left ronni.

## 2023-06-01 NOTE — PHYSICAL THERAPY INITIAL EVALUATION ADULT - GENERAL OBSERVATIONS, REHAB EVAL
Chart (EMR) reviewed. Received supine c HOB elevated, NAD. On constant observation. +heplock. Lethargic but arousable. Confused. Ox1. Minimally verbal c monotone voice and slurred speech. Able to follow simple commands but inconsistently. Pt calm and cooperative at this time.

## 2023-06-01 NOTE — PHYSICAL THERAPY INITIAL EVALUATION ADULT - ADDITIONAL COMMENTS
Patient lives c son in a pvt house c 12 entry steps c L rail up, 1 level inside home. Independent c basic ADL's and household ambulation with cane/rolling walker. Pt also has own wheelchair. As per son (Oscar), patient lives c son in a pvt house c 12 entry steps c L rail up, 1 level inside home. Independent c basic ADL's and household ambulation with cane/rolling walker. Pt also has own wheelchair.

## 2023-06-01 NOTE — CONSULT NOTE ADULT - PROBLEM SELECTOR RECOMMENDATION 4
min ambulatory w RW at baseline, now more debilitated, dependent on care, lives w son.  Son agreeable for home hospice. Speedy reddy.

## 2023-06-01 NOTE — CONSULT NOTE ADULT - PROBLEM SELECTOR RECOMMENDATION 5
C discussion as above. Son agreeable for home hospice referral. undecided about code status, remains full code for now. Palliative will follow.

## 2023-06-01 NOTE — PHYSICAL THERAPY INITIAL EVALUATION ADULT - RANGE OF MOTION EXAMINATION, REHAB EVAL
AAROM except both shoulders limited due to pain and contractures./bilateral upper extremity ROM was WFL (within functional limits)/bilateral lower extremity ROM was WFL (within functional limits)/deficits as listed below

## 2023-06-01 NOTE — PROGRESS NOTE ADULT - SUBJECTIVE AND OBJECTIVE BOX
This is in follow-up to my initial Neurology Consult note of 5/30/23.  Results of recommended tests:    B12/folate  924/7.0   methylmalonic acid (MMA)/homocysteine (Hcy)    Pending/27.5  TSH  1.45  25-OH vit D level  9.5   ESR/CRP 27/<3.    Routine EEG.                This is in follow-up to my initial Neurology Consult note of 5/30/23.  Results of recommended tests:    B12/folate  924/7.0   methylmalonic acid (MMA)/homocysteine (Hcy)    Pending/27.5  TSH  1.45  25-OH vit D level  9.5   ESR/CRP 27/<3.    Routine EEG.  -  was ordered.      Pt's son present in the room.      EXAMINATION     Supine in bed.   Awake.  Does not speak unless spoken to.  Faint voice; uses single words or short phrases.  Does not provide his age.  No response to questions about current location, today's date, name of the president.  He does not recognize his son at the bedside (does not know who it is).  .

## 2023-06-01 NOTE — CONSULT NOTE ADULT - PROBLEM SELECTOR RECOMMENDATION 3
Clinical evidence indicates that the patient has Severe protein calorie malnutrition/ 3rd degree    In context of     Acute Illness/Injury (>7days)    vs    Chronic Illness (>1 month)    Energy/Food intake <50% of estimated energy requirement >5 days  Weight loss: Moderate - severe (lbs lost recently)  Body Fat loss: Severe   (Cachexia, temporal wasting,  muscle atrophy)  Muscle mass loss: Severe  (Skin failure/pressure ulcers)    Strength: weakened severe (bedbound)    Recommend:   pleasure feeds as tolerated - aspiration precautions, careful hand-feeding, teaching to caregivers  nutritional supplements as tolerated, nutrition consult

## 2023-06-01 NOTE — PROGRESS NOTE ADULT - ASSESSMENT
81 yo male w/ pmhx of CKD, DM, HTN presented to the ED BIBEMS 2/2 due to fall.    Hyperkalemia  - s/p temporizing measures  - nephro following  - now resolved    CKD5  - Appreciate nephro recs  - started 1/2 NS  - no plans for HD    Plan: Right hip femoral fracture due to fall. Had right hip surgery. Stable in bed.  CT chest no pneumonia.  Stop all ABX.   WBAT.     COVID- 19 :  No evidence of Hypoxia, no treatment needed.     Elevated Troponin; continue to trend, unable to assess if any chest pain, patient is currently confused. TTE decreased EF 40 %. EKG notes inferior and septal MI. CHF, euvolemic.      DM: stable, hold all meds. SSC.   Acceptable today around 195 off Lantus.      Essential HTN: resume home medication lopressor, lipitor and Imdur.  Hydralazine, and Norvasc.    Only on Amiodarone now.         Renal: Creatinine 4.60. Renal consult was called, Dr. Cook will see.  Creatinine one year ago was 4.10.     Stable on tele, some bradycardia.  PT eval.  Changed to PO bicarb.     IV Heparin for now. Tele shows second degree HB but rate is 60-80, then mostly A.fib rate controlled.   Patient not willing to have PPM here, wants to speak to outside Cardiologist.   Family members states patient does not want a PPM.       PT eval pending.

## 2023-06-01 NOTE — CONSULT NOTE ADULT - CONVERSATION DETAILS
Spoke with patient's son Oscar via phone  regarding current condition, goals of care. Discussed trajectory of dementia, progressive kidney disease, at risk for further complications, decline. Son states he has siblings that he discusses condition, he is primary decisionmaker as pt lives w him and he is caretaker. Son expressed that patient has been declining since his hip fracture in Feb, becoming more confused, not eating well. He stated that the patient has declined HD in the past. He wanted more clarity on why the patient was declining, reviewed comorbidities and findings. Advised patient is hospice eligible, educated on hospice philosophy/services. He is in agreement for hospice referral, he is also planning on pvt hiring to assist w personal care. Discussed risks/benefits of LST such as CPR, intubation in the context of advanced illness and debility, he stated that he needed more time to think about code status. He hopes to have his father home soon, feels he will do better at home in a familiar environment. Support provided.

## 2023-06-01 NOTE — CONSULT NOTE ADULT - SUBJECTIVE AND OBJECTIVE BOX
Consult to: Discuss complex medical decision making related to goals of care    [facility] Palliative Care Consult Service:   MD Larissa Max NP    May contact any member of Palliative Care team via Microsoft Teams for consults or questions       HPI:  83-year-old male PMH of hypertension,  hyperlipidemia, diabetes,  right hip fracture status post repair as well as 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  Patient has not been doing well since February into March status post surgery however worsening cognitively decline started occurring around April as per son.  Patient currently seems confused unable to tell time or place.  Per ER discussions, family members do not want HD as a treatment option.    (29 May 2023 20:30)    Interval history: pt confused, nonverbal, NAD.       PAST MEDICAL & SURGICAL HISTORY:  DM (diabetes mellitus)      CHF (congestive heart failure)          FAMILY HISTORY:   unable to obtain from patient due to poor mentation, family unable to give information, see H&P for history      SOCIAL HISTORY:   Admitted from:  home  (with HHA)     Shinto/spirituality: Caodaism  [ none ] Substance abuse, [ none ] Tobacco hx, [ none ] Alcohol hx  [ none ] Home Opioid use      Baseline ADLs (prior to admission): confused, min ambul w RW, has WC    Review of systems:          PAIN AD Score: 0    [email or web address] (press ctrl +  left click to view)  CPOT:    https://[email or web address]/getattachment/[email or web address]/Critical-Care-Pain-Observation-Tool-(CPOT)     Unable to obtain/Limited due to poor mental status    Allergies    No Known Allergies    Intolerances           MEDICATIONS  (STANDING):  aMIOdarone    Tablet 200 milliGRAM(s) Oral daily  apixaban 2.5 milliGRAM(s) Oral every 12 hours  aspirin enteric coated 81 milliGRAM(s) Oral daily  atorvastatin 40 milliGRAM(s) Oral at bedtime  brimonidine 0.2% Ophthalmic Solution 1 Drop(s) Both EYES two times a day  dorzolamide 2%/timolol 0.5% Ophthalmic Solution 1 Drop(s) Both EYES two times a day  hydrALAZINE 25 milliGRAM(s) Oral three times a day  isosorbide   mononitrate ER Tablet (IMDUR) 60 milliGRAM(s) Oral daily  multivitamin/minerals 1 Tablet(s) Oral daily  OLANZapine 5 milliGRAM(s) Oral <User Schedule>  pantoprazole    Tablet 40 milliGRAM(s) Oral before breakfast  polyethylene glycol 3350 17 Gram(s) Oral two times a day  senna 2 Tablet(s) Oral at bedtime  sodium bicarbonate 650 milliGRAM(s) Oral three times a day    MEDICATIONS  (PRN):  LORazepam   Injectable 2 milliGRAM(s) IV Push every 6 hours PRN Agitation  OLANZapine 2.5 milliGRAM(s) Oral every 12 hours PRN agitation      PHYSICAL EXAM:  Vital Signs Last 24 Hrs  T(C): 36.6 (01 Jun 2023 09:43), Max: 36.6 (01 Jun 2023 09:43)  T(F): 97.9 (01 Jun 2023 09:43), Max: 97.9 (01 Jun 2023 09:43)  HR: 87 (01 Jun 2023 09:43) (63 - 98)  BP: 127/66 (01 Jun 2023 09:43) (112/72 - 155/83)  BP(mean): --  RR: 18 (01 Jun 2023 09:43) (17 - 18)  SpO2: 92% (01 Jun 2023 09:43) (92% - 98%)    Parameters below as of 01 Jun 2023 08:40  Patient On (Oxygen Delivery Method): room air         Palliative Performance Scale/Karnofsky Score: 20     GENERAL: alert, confused, cachectic, NAD  HEENT: Atraumatic, oropharynx clear, neck supple  CHEST/LUNG: unlabored  HEART: Regular rate and rhythm    ABDOMEN: Soft, Nontender, Nondistended   MUSCULOSKELETAL:  No  edema  NERVOUS SYSTEM: confused, not answering questions, currently calm  SKIN: No rashes or lesions noted  Oral intake: poor     LABS:                        11.7   9.96  )-----------( 218      ( 01 Jun 2023 10:00 )             38.0     06-01    142  |  114<H>  |  64<H>  ----------------------------<  147<H>  5.0   |  20<L>  |  5.12<H>    Ca    8.4<L>      01 Jun 2023 07:35    TPro  5.9<L>  /  Alb  2.1<L>  /  TBili  0.5  /  DBili  x   /  AST  15  /  ALT  15  /  AlkPhos  74  06-01        RADIOLOGY & ADDITIONAL STUDIES:              Consult to: Discuss complex medical decision making related to goals of care    [facility] Palliative Care Consult Service:   MD Larissa Max NP    May contact any member of Palliative Care team via Microsoft Teams for consults or questions       HPI:  83-year-old male PMH of hypertension,  hyperlipidemia, diabetes,  right hip fracture status post repair as well as 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  Patient has not been doing well since February into March status post surgery however worsening cognitively decline started occurring around April as per son.  Patient currently seems confused unable to tell time or place.  Per ER discussions, family members do not want HD as a treatment option.    (29 May 2023 20:30)    Interval history: pt confused, nonverbal, NAD.       PAST MEDICAL & SURGICAL HISTORY:  DM (diabetes mellitus)      CHF (congestive heart failure)      FAMILY HISTORY:   unable to obtain from patient due to poor mentation, family unable to give information, see H&P for history      SOCIAL HISTORY:   Admitted from:  home      Confucianist/spirituality: Yazdanism  [ none ] Substance abuse, [ none ] Tobacco hx, [ none ] Alcohol hx  [ none ] Home Opioid use      Baseline ADLs (prior to admission): confused, min ambul w RW, has WC    Review of systems:          PAIN AD Score: 0    [email or web address] (press ctrl +  left click to view)  CPOT:    https://www.[facility].org/getattachment/[email or web address]/Critical-Care-Pain-Observation-Tool-(CPOT)     Unable to obtain/Limited due to poor mental status    Allergies    No Known Allergies    Intolerances           MEDICATIONS  (STANDING):  aMIOdarone    Tablet 200 milliGRAM(s) Oral daily  apixaban 2.5 milliGRAM(s) Oral every 12 hours  aspirin enteric coated 81 milliGRAM(s) Oral daily  atorvastatin 40 milliGRAM(s) Oral at bedtime  brimonidine 0.2% Ophthalmic Solution 1 Drop(s) Both EYES two times a day  dorzolamide 2%/timolol 0.5% Ophthalmic Solution 1 Drop(s) Both EYES two times a day  hydrALAZINE 25 milliGRAM(s) Oral three times a day  isosorbide   mononitrate ER Tablet (IMDUR) 60 milliGRAM(s) Oral daily  multivitamin/minerals 1 Tablet(s) Oral daily  OLANZapine 5 milliGRAM(s) Oral <User Schedule>  pantoprazole    Tablet 40 milliGRAM(s) Oral before breakfast  polyethylene glycol 3350 17 Gram(s) Oral two times a day  senna 2 Tablet(s) Oral at bedtime  sodium bicarbonate 650 milliGRAM(s) Oral three times a day    MEDICATIONS  (PRN):  LORazepam   Injectable 2 milliGRAM(s) IV Push every 6 hours PRN Agitation  OLANZapine 2.5 milliGRAM(s) Oral every 12 hours PRN agitation      PHYSICAL EXAM:  Vital Signs Last 24 Hrs  T(C): 36.6 (01 Jun 2023 09:43), Max: 36.6 (01 Jun 2023 09:43)  T(F): 97.9 (01 Jun 2023 09:43), Max: 97.9 (01 Jun 2023 09:43)  HR: 87 (01 Jun 2023 09:43) (63 - 98)  BP: 127/66 (01 Jun 2023 09:43) (112/72 - 155/83)  BP(mean): --  RR: 18 (01 Jun 2023 09:43) (17 - 18)  SpO2: 92% (01 Jun 2023 09:43) (92% - 98%)    Parameters below as of 01 Jun 2023 08:40  Patient On (Oxygen Delivery Method): room air         Palliative Performance Scale/Karnofsky Score: 20     GENERAL: alert, confused, cachectic, NAD  HEENT: Atraumatic, oropharynx clear, neck supple  CHEST/LUNG: unlabored  HEART: Regular rate and rhythm    ABDOMEN: Soft, Nontender, Nondistended   MUSCULOSKELETAL:  No  edema  NERVOUS SYSTEM: confused, not answering questions, currently calm  SKIN: No rashes or lesions noted  Oral intake: poor     LABS:                        11.7   9.96  )-----------( 218      ( 01 Jun 2023 10:00 )             38.0     06-01    142  |  114<H>  |  64<H>  ----------------------------<  147<H>  5.0   |  20<L>  |  5.12<H>    Ca    8.4<L>      01 Jun 2023 07:35    TPro  5.9<L>  /  Alb  2.1<L>  /  TBili  0.5  /  DBili  x   /  AST  15  /  ALT  15  /  AlkPhos  74  06-01        RADIOLOGY & ADDITIONAL STUDIES:  < from: CT Head No Cont (05.29.23 @ 19:13) >  ACC: [id number] EXAM:  CT BRAIN   ORDERED BY: MARYCHUY MEJÍA     PROCEDURE DATE:  05/29/2023          INTERPRETATION:  CT HEAD    CLINICAL HISTORY: confusion x 1 month, worsened AMS    TECHNIQUE:  Noncontrast CT.  Axial Acquisition.  Sagittal and coronal reformations.    COMPARISON:  Compared to study dated 2/16/2023    FINDINGS:  Exam is mildly degraded by motion.  HEMORRHAGE:  No evidence of intracranial hemorrhage.  BRAIN PARENCHYMA:  Moderate atrophy. Chronic lacunar infarct left side of   the ronni. Chronic medial left occipital infarct. Mild chronic   periventricular white matter ischemic changes No mass or mass effect.  VENTRICLES / SHIFT:  No hydrocephalus. No midline shift.  EXTRA-AXIAL / BASAL CISTERNS:  No extra-axial mass. Basal cisterns   preserved.  CALVARIUM AND EXTRACRANIAL SOFT TISSUES:  No depressed calvarial fracture.  SINUSES, ORBITS, MASTOIDS:  The visualized paranasal sinuses and mastoid   air cells are well aerated. 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 No evidence of an acute intracranial hemorrhage,   midline shift, or hydrocephalus. 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    --- End of Report ---            SAE HUNTLEY MD; Attending Radiologist  This document has been electronically signed. May 29 2023  7:25PM    < end of copied text >

## 2023-06-02 LAB
GLUCOSE BLDC GLUCOMTR-MCNC: 161 MG/DL — HIGH (ref 70–99)
GLUCOSE BLDC GLUCOMTR-MCNC: 163 MG/DL — HIGH (ref 70–99)
GLUCOSE BLDC GLUCOMTR-MCNC: 171 MG/DL — HIGH (ref 70–99)

## 2023-06-02 PROCEDURE — 99231 SBSQ HOSP IP/OBS SF/LOW 25: CPT

## 2023-06-02 PROCEDURE — 99232 SBSQ HOSP IP/OBS MODERATE 35: CPT

## 2023-06-02 PROCEDURE — 95816 EEG AWAKE AND DROWSY: CPT | Mod: 26

## 2023-06-02 RX ORDER — OLANZAPINE 15 MG/1
7.5 TABLET, FILM COATED ORAL
Refills: 0 | Status: DISCONTINUED | OUTPATIENT
Start: 2023-06-02 | End: 2023-06-05

## 2023-06-02 RX ADMIN — APIXABAN 2.5 MILLIGRAM(S): 2.5 TABLET, FILM COATED ORAL at 17:50

## 2023-06-02 RX ADMIN — BRIMONIDINE TARTRATE 1 DROP(S): 2 SOLUTION/ DROPS OPHTHALMIC at 06:16

## 2023-06-02 RX ADMIN — DORZOLAMIDE HYDROCHLORIDE TIMOLOL MALEATE 1 DROP(S): 20; 5 SOLUTION/ DROPS OPHTHALMIC at 17:50

## 2023-06-02 RX ADMIN — APIXABAN 2.5 MILLIGRAM(S): 2.5 TABLET, FILM COATED ORAL at 06:15

## 2023-06-02 RX ADMIN — PANTOPRAZOLE SODIUM 40 MILLIGRAM(S): 20 TABLET, DELAYED RELEASE ORAL at 06:15

## 2023-06-02 RX ADMIN — Medication 25 MILLIGRAM(S): at 06:15

## 2023-06-02 RX ADMIN — POLYETHYLENE GLYCOL 3350 17 GRAM(S): 17 POWDER, FOR SOLUTION ORAL at 17:50

## 2023-06-02 RX ADMIN — Medication 650 MILLIGRAM(S): at 06:15

## 2023-06-02 RX ADMIN — AMIODARONE HYDROCHLORIDE 200 MILLIGRAM(S): 400 TABLET ORAL at 06:15

## 2023-06-02 RX ADMIN — Medication 2 MILLIGRAM(S): at 01:43

## 2023-06-02 RX ADMIN — DORZOLAMIDE HYDROCHLORIDE TIMOLOL MALEATE 1 DROP(S): 20; 5 SOLUTION/ DROPS OPHTHALMIC at 06:16

## 2023-06-02 RX ADMIN — OLANZAPINE 2.5 MILLIGRAM(S): 15 TABLET, FILM COATED ORAL at 06:14

## 2023-06-02 RX ADMIN — POLYETHYLENE GLYCOL 3350 17 GRAM(S): 17 POWDER, FOR SOLUTION ORAL at 06:14

## 2023-06-02 RX ADMIN — BRIMONIDINE TARTRATE 1 DROP(S): 2 SOLUTION/ DROPS OPHTHALMIC at 17:50

## 2023-06-02 NOTE — BH CONSULTATION LIAISON PROGRESS NOTE - NSBHCHARTREVIEWLAB_PSY_A_CORE FT
06-01    142  |  114<H>  |  64<H>  ----------------------------<  147<H>  5.0   |  20<L>  |  5.12<H>    Ca    8.4<L>      01 Jun 2023 07:35    TPro  5.9<L>  /  Alb  2.1<L>  /  TBili  0.5  /  DBili  x   /  AST  15  /  ALT  15  /  AlkPhos  74  06-01    
06-01    142  |  114<H>  |  64<H>  ----------------------------<  147<H>  5.0   |  20<L>  |  5.12<H>    Ca    8.4<L>      01 Jun 2023 07:35    TPro  5.9<L>  /  Alb  2.1<L>  /  TBili  0.5  /  DBili  x   /  AST  15  /  ALT  15  /  AlkPhos  74  06-01

## 2023-06-02 NOTE — BH CONSULTATION LIAISON PROGRESS NOTE - NSICDXBHSECONDARYDX_PSY_ALL_CORE
Delirium due to multiple etiologies   F05  Vascular dementia   F01.50  
Delirium due to multiple etiologies   F05  Vascular dementia   F01.50

## 2023-06-02 NOTE — BH CONSULTATION LIAISON PROGRESS NOTE - CURRENT MEDICATION
MEDICATIONS  (STANDING):  aMIOdarone    Tablet 200 milliGRAM(s) Oral daily  apixaban 2.5 milliGRAM(s) Oral every 12 hours  aspirin enteric coated 81 milliGRAM(s) Oral daily  atorvastatin 40 milliGRAM(s) Oral at bedtime  brimonidine 0.2% Ophthalmic Solution 1 Drop(s) Both EYES two times a day  dorzolamide 2%/timolol 0.5% Ophthalmic Solution 1 Drop(s) Both EYES two times a day  hydrALAZINE 25 milliGRAM(s) Oral three times a day  isosorbide   mononitrate ER Tablet (IMDUR) 60 milliGRAM(s) Oral daily  multivitamin/minerals 1 Tablet(s) Oral daily  OLANZapine 5 milliGRAM(s) Oral <User Schedule>  pantoprazole    Tablet 40 milliGRAM(s) Oral before breakfast  polyethylene glycol 3350 17 Gram(s) Oral two times a day  senna 2 Tablet(s) Oral at bedtime  sodium bicarbonate 650 milliGRAM(s) Oral three times a day    MEDICATIONS  (PRN):  LORazepam   Injectable 2 milliGRAM(s) IV Push every 6 hours PRN Agitation  OLANZapine 2.5 milliGRAM(s) Oral every 12 hours PRN agitation  
MEDICATIONS  (STANDING):  aMIOdarone    Tablet 200 milliGRAM(s) Oral daily  apixaban 2.5 milliGRAM(s) Oral every 12 hours  aspirin enteric coated 81 milliGRAM(s) Oral daily  atorvastatin 40 milliGRAM(s) Oral at bedtime  brimonidine 0.2% Ophthalmic Solution 1 Drop(s) Both EYES two times a day  dorzolamide 2%/timolol 0.5% Ophthalmic Solution 1 Drop(s) Both EYES two times a day  hydrALAZINE 25 milliGRAM(s) Oral three times a day  isosorbide   mononitrate ER Tablet (IMDUR) 60 milliGRAM(s) Oral daily  multivitamin/minerals 1 Tablet(s) Oral daily  OLANZapine 5 milliGRAM(s) Oral <User Schedule>  OLANZapine 2.5 milliGRAM(s) Oral <User Schedule>  pantoprazole    Tablet 40 milliGRAM(s) Oral before breakfast  polyethylene glycol 3350 17 Gram(s) Oral two times a day  senna 2 Tablet(s) Oral at bedtime  sodium bicarbonate 650 milliGRAM(s) Oral three times a day    MEDICATIONS  (PRN):  LORazepam   Injectable 2 milliGRAM(s) IV Push every 6 hours PRN Agitation  OLANZapine 2.5 milliGRAM(s) Oral every 12 hours PRN agitation

## 2023-06-02 NOTE — BH CONSULTATION LIAISON PROGRESS NOTE - NSBHCHARTREVIEWVS_PSY_A_CORE FT
Vital Signs Last 24 Hrs  T(C): 36.3 (02 Jun 2023 11:14), Max: 36.8 (01 Jun 2023 15:56)  T(F): 97.3 (02 Jun 2023 11:14), Max: 98.2 (01 Jun 2023 15:56)  HR: 60 (02 Jun 2023 11:14) (60 - 110)  BP: 144/64 (02 Jun 2023 11:14) (108/58 - 144/64)  BP(mean): --  RR: 18 (02 Jun 2023 11:14) (16 - 18)  SpO2: 97% (02 Jun 2023 11:14) (94% - 98%)    Parameters below as of 02 Jun 2023 05:31  Patient On (Oxygen Delivery Method): room air    
Vital Signs Last 24 Hrs  T(C): 36.6 (01 Jun 2023 09:43), Max: 36.6 (01 Jun 2023 09:43)  T(F): 97.9 (01 Jun 2023 09:43), Max: 97.9 (01 Jun 2023 09:43)  HR: 87 (01 Jun 2023 09:43) (63 - 98)  BP: 127/66 (01 Jun 2023 09:43) (112/72 - 155/83)  BP(mean): --  RR: 18 (01 Jun 2023 09:43) (17 - 18)  SpO2: 92% (01 Jun 2023 09:43) (92% - 98%)    Parameters below as of 01 Jun 2023 08:40  Patient On (Oxygen Delivery Method): room air

## 2023-06-02 NOTE — BH CONSULTATION LIAISON PROGRESS NOTE - NSBHFUPINTERVALHXFT_PSY_A_CORE
Appreciate Palliative Care consultation - Patient is hospice eligible and Pt's son is in agreement for hospice referral. Patient needed 1 dose of IVP Ativan PRNs early this morning. On standing Zyprexa 5mg PO qhs started by primary team thus far with good effect and no discernible adverse medication side effects. Same clinical presentation - awake, alert, confused, disoriented, does not engage in verbal or nonverbal communication, blankly stares at Writer, does not respond to social cues.
Appreciate Palliative Care consultation. Patient did not need any IVP Ativan PRNs since last seen, PO PRN Zyprexa x 2 used only. On standing Zyprexa 5mg PO qhs started by primary team thus far with good effect and no discernible adverse medication side effects. Same clinical presentation - awake, alert, confused, disoriented, does not engage in verbal or nonverbal communication, blankly stares at Writer, does not respond to social cues.

## 2023-06-02 NOTE — PROGRESS NOTE ADULT - ASSESSMENT
Severe dementia.      Cachexia.    Protein-calorie malnutrition.      Elevated homocysteine; possible B12 and/or folate deficiency (the B12 level could be a false hi  The assay for B12 does not measure B12 level directly.  False normal and false elevations (to or above the normal range) occur with pernicious anemia due to intrinsic factor antibodies, which may or may not be detected by antibody tests.  B12-like compounds of vegetable origin (a problem with vegans) without cobalamin activity also can lead to falsely normal or elevated determinations.  Methylmalonic acid (MMA) and homocysteine (Hcy) determinations are necessary to elucidate what is happening.    Irrespective of B12 and folate levels:       high Hcy w normal MMA implies folate deficiency;        high MMA and Hcy implies B12 deficiency +/- folate deficiency.    Awaiting MMA level.          RECOMMENDATIONS    RBC folic acid level.      Intrinsic factor and parietal cell antibody determinations.       Administer cyanocobalamin 1000mcg IM x1, folic acid 5mg PO x1, B complex tab x1.    Afterwards cyanocobalamin 1000mcg IM weekly x 3, folic acid 2mg PO daily, B complex daily.  In out-Pt follow-up, if either the MMA or Hcy result from here turns out high, need to repeat MMA and Hcy to ascertain if there was a response to the vitamin supplement treatment regimen.  Then manage as appropriate.    Management of malnutrition per dietary service.                                                           IMPORTANT  -  PLEASE NOTE:                              I am a neurohospitalist. I do not see patients outside of the hospital.        Patients requiring neurological follow-up after discharge may contact one of the following offices.     [facility] [address] [id number] [phone]    [facility]  [address].  [place], [place]  [phone]      Roman Newton M.D.   - Department of Neurology  [employer] [facility] [place]       Severe dementia.      Cachexia.    Protein-calorie malnutrition.      Elevated homocysteine; possible B12 and/or folate deficiency (the B12 level could be a false hi  The assay for B12 does not measure B12 level directly.  False normal and false elevations (to or above the normal range) occur with pernicious anemia due to intrinsic factor antibodies, which may or may not be detected by antibody tests.  B12-like compounds of vegetable origin (a problem with vegans) without cobalamin activity also can lead to falsely normal or elevated determinations.  Methylmalonic acid (MMA) and homocysteine (Hcy) determinations are necessary to elucidate what is happening.    Irrespective of B12 and folate levels:       high Hcy w normal MMA implies folate deficiency;        high MMA and Hcy implies B12 deficiency +/- folate deficiency.    Awaiting MMA level.          RECOMMENDATIONS    RBC folic acid level; intrinsic factor and parietal cell antibody determinations; RPR..       Administer cyanocobalamin 1000mcg IM x1, folic acid 5mg PO x1, B complex tab x1.    Afterwards cyanocobalamin 1000mcg IM weekly x 3, folic acid 2mg PO daily, B complex daily.  In out-Pt follow-up, if either the MMA or Hcy result from here turns out high, need to repeat MMA and Hcy to ascertain if there was a response to the vitamin supplement treatment regimen.  Then manage as appropriate.    Management of malnutrition per dietary service.                                                           IMPORTANT  -  PLEASE NOTE:                              I am a neurohospitalist. I do not see patients outside of the hospital.        Patients requiring neurological follow-up after discharge may contact one of the following offices.     [facility] [address] [id number] [phone]    [facility]  [address]  [place], [place]  [phone]      Roman Newton M.D.   - Department of Neurology  [employer] at \[facility] [place] [facility]\""/[facility]

## 2023-06-02 NOTE — BH CONSULTATION LIAISON PROGRESS NOTE - NSBHATTESTBILLING_PSY_A_CORE
52457-Olslfkuazt OBS or IP - low complexity OR 25-34 mins
89441-Tgnuqoirna OBS or IP - low complexity OR 25-34 mins

## 2023-06-02 NOTE — BH CONSULTATION LIAISON PROGRESS NOTE - NSBHCONSULTRECOMMENDOTHER_PSY_A_CORE FT
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 Chronic medial left occipital infarct) making baseline mental status impaired with chronic disorientation, confusion, inability to verbalize needs, needs full ADL assistance   - Palliative Care consult recommended at this point given guarded prognosis   6/1/23: no new recommendations  - can add on AM dose of Zyprexa 2.5mg PO in am and con't 5mg PO qhs   6/2/23: hospice candidate, referral made. Appreciate Palliative Care consult   - more agitation, restlessness in hs which is expected. increase Zyprexa 5mg to 7.5mg PO at hs, continue morning 2.5mg 
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 Chronic medial left occipital infarct) making baseline mental status impaired with chronic disorientation, confusion, inability to verbalize needs, needs full ADL assistance   - Palliative Care consult recommended at this point given guarded prognosis   6/1/23: no new recommendations  - can add on AM dose of Zyprexa 2.5mg PO in am and con't 5mg PO qhs

## 2023-06-02 NOTE — EEG REPORT - NS EEG TEXT BOX
KESHA OLIVARES [id number] 83y (1940)M  Admitting MD: Dr. Roddy Stacy    Study Date: 06-02-23    --------------------------------------------------------------------------------------------------  History:  CC/ HPI Patient is a 83y old  Male who presents with a chief complaint of AMS (02 Jun 2023 14:48)    aMIOdarone    Tablet 200 milliGRAM(s) Oral daily  apixaban 2.5 milliGRAM(s) Oral every 12 hours  aspirin enteric coated 81 milliGRAM(s) Oral daily  atorvastatin 40 milliGRAM(s) Oral at bedtime  brimonidine 0.2% Ophthalmic Solution 1 Drop(s) Both EYES two times a day  dorzolamide 2%/timolol 0.5% Ophthalmic Solution 1 Drop(s) Both EYES two times a day  hydrALAZINE 25 milliGRAM(s) Oral three times a day  isosorbide   mononitrate ER Tablet (IMDUR) 60 milliGRAM(s) Oral daily  multivitamin/minerals 1 Tablet(s) Oral daily  OLANZapine 2.5 milliGRAM(s) Oral <User Schedule>  OLANZapine 7.5 milliGRAM(s) Oral <User Schedule>  pantoprazole    Tablet 40 milliGRAM(s) Oral before breakfast  polyethylene glycol 3350 17 Gram(s) Oral two times a day  senna 2 Tablet(s) Oral at bedtime  sodium bicarbonate 650 milliGRAM(s) Oral three times a day    --------------------------------------------------------------------------------------------------  Study Interpretation:    [[[Abbreviation Key:  PDR=alpha rhythm/posterior dominant rhythm. A-P=anterior posterior gradient.  Amplitude: ‘very low’:<20; ‘low’:20-50; ‘medium’:; ‘high’:>200uV.  Persistence for periodic/rhythmic patterns (% of epoch) ‘rare’:<1%; ‘occasional’:1-10%; ‘frequent’:10-50%; ‘abundant’:50-90%; ‘continuous’:>90%.  Persistence for sporadic discharges: ‘rare’:<1/hr; ‘occasional’:1/min-1/hr; ‘frequent’:>1/min; ‘abundant’:>1/10 sec.  GRDA=generalized rhythmic delta activity; FIRDA=frontal intermittent GRDA; LRDA=lateralized rhythmic delta activity; TIRDA=temporal intermittent rhythmic delta activity;  LPD=PLED=lateralized periodic discharges; GPD=generalized periodic discharges; BiPDs=BiPLEDs=bilateral independent periodic epileptiform discharges; SIRPID=stimulus induced rhythmic, periodic, or ictal appearing discharges; BIRDs=brief potentially ictal rhythmic discharges >4 Hz, lasting .5-10s; PFA (paroxysmal bursts >13 Hz or =8 Hz).  Modifiers: +F=with fast component; +S=with spike component; +R=with rhythmic component.  S-B=burst suppression pattern.  Max=maximal. N1-drowsy; N2-stage II sleep; N3-slow wave sleep. SSS/BETS=small sharp spikes/benign epileptiform transients of sleep. HV=hyperventilation; PS=photic stimulation]]]    FINDINGS:  The background was continuous, spontaneously variable and reactive.  No clear wakefulness No clear PDR.     Background Slowing:  Generalized slowing: Diffuse delta  Focal slowing: none was present.    Sleep Background:  -N2 sleep transients were not recorded.    Epileptiform Activity:   No interictal epileptiform discharges were present.    Events:  No clinical events were recorded.  No seizures were recorded.    Activation Procedures:   -Hyperventilation was not performed.    -Photic stimulation was performed and did not elicit any abnormalities.      Artifacts:  Intermittent myogenic and external motion artifacts were noted.    ECG:  The heart rate on single channel ECG at baseline was predominantly near BPM = 110-120  -----------------------------------------------------------------------------------------------------    EEG Classification / Summary:  Abnormal EEG study    Background slowing, generalized, moderate  -----------------------------------------------------------------------------------------------------    Clinical Impression:    Moderate diffuse/multifocal cerebral dysfunction, not specific as to etiology. Of note there is no clear wakefulness on this recording. It is reasonable to obtain a longer study to evaluate wakefulness.   There were no epileptiform abnormalities recorded.      This is a prelim report only, pending review with attending prior to finalization.    -------------------------------------------------------------------------------------------------------  [facility] EEG Reading Room Ph#: [phone]  Epilepsy Answering Service after 5PM and before 8:30AM: Ph#: [phone]    Vamshi Michelle M.D, epilepsy fellow   KESHA OLIVARES [id number] 83y (1940)M  Admitting MD: Dr. Roddy Stacy    Study Date: 06-02-23  Duration: 21 min  --------------------------------------------------------------------------------------------------  History:  CC/ HPI Patient is a 83y old  Male who presents with a chief complaint of AMS (02 Jun 2023 14:48)    aMIOdarone    Tablet 200 milliGRAM(s) Oral daily  apixaban 2.5 milliGRAM(s) Oral every 12 hours  aspirin enteric coated 81 milliGRAM(s) Oral daily  atorvastatin 40 milliGRAM(s) Oral at bedtime  brimonidine 0.2% Ophthalmic Solution 1 Drop(s) Both EYES two times a day  dorzolamide 2%/timolol 0.5% Ophthalmic Solution 1 Drop(s) Both EYES two times a day  hydrALAZINE 25 milliGRAM(s) Oral three times a day  isosorbide   mononitrate ER Tablet (IMDUR) 60 milliGRAM(s) Oral daily  multivitamin/minerals 1 Tablet(s) Oral daily  OLANZapine 2.5 milliGRAM(s) Oral <User Schedule>  OLANZapine 7.5 milliGRAM(s) Oral <User Schedule>  pantoprazole    Tablet 40 milliGRAM(s) Oral before breakfast  polyethylene glycol 3350 17 Gram(s) Oral two times a day  senna 2 Tablet(s) Oral at bedtime  sodium bicarbonate 650 milliGRAM(s) Oral three times a day    --------------------------------------------------------------------------------------------------  Study Interpretation:    [[[Abbreviation Key:  PDR=alpha rhythm/posterior dominant rhythm. A-P=anterior posterior gradient.  Amplitude: ‘very low’:<20; ‘low’:20-50; ‘medium’:; ‘high’:>200uV.  Persistence for periodic/rhythmic patterns (% of epoch) ‘rare’:<1%; ‘occasional’:1-10%; ‘frequent’:10-50%; ‘abundant’:50-90%; ‘continuous’:>90%.  Persistence for sporadic discharges: ‘rare’:<1/hr; ‘occasional’:1/min-1/hr; ‘frequent’:>1/min; ‘abundant’:>1/10 sec.  GRDA=generalized rhythmic delta activity; FIRDA=frontal intermittent GRDA; LRDA=lateralized rhythmic delta activity; TIRDA=temporal intermittent rhythmic delta activity;  LPD=PLED=lateralized periodic discharges; GPD=generalized periodic discharges; BiPDs=BiPLEDs=bilateral independent periodic epileptiform discharges; SIRPID=stimulus induced rhythmic, periodic, or ictal appearing discharges; BIRDs=brief potentially ictal rhythmic discharges >4 Hz, lasting .5-10s; PFA (paroxysmal bursts >13 Hz or =8 Hz).  Modifiers: +F=with fast component; +S=with spike component; +R=with rhythmic component.  S-B=burst suppression pattern.  Max=maximal. N1-drowsy; N2-stage II sleep; N3-slow wave sleep. SSS/BETS=small sharp spikes/benign epileptiform transients of sleep. HV=hyperventilation; PS=photic stimulation]]]    FINDINGS:    The background was continuous and symmetric. The predominant background consisted of diffuse polymorphic delta frequencies. Possible fragments of a 7-Hz posterior dominant rhythm later in the study.    Background Slowing:  Generalized slowing: As above  Focal slowing: none was present.    Sleep Background:  No clear state change captured.    Epileptiform Activity:   No interictal epileptiform discharges were present.    Events:  No clinical events were recorded.  No seizures were recorded.    Activation Procedures:   -Hyperventilation was not performed.    -Photic stimulation was performed and did not elicit any abnormalities.      Artifacts:  Continuous myogenic, motion, and electrical artifacts limited interpretation.    EKG:  Single-lead EKG showed irregular rhythm at times, heart rate 100-110 bpm.    -----------------------------------------------------------------------------------------------------    EEG Classification / Summary:  Abnormal routine EEG study in a lethargic patient  Background slowing, generalized, moderate  Interpretation limited due to continuous myogenic, motion, and electrical artifacts.    -----------------------------------------------------------------------------------------------------    Clinical Impression:  Interpretation limited due to continuous artifacts.  Moderate diffuse/multifocal cerebral dysfunction, not specific as to etiology.  Single-lead EKG showed irregular rhythm at times.    -------------------------------------------------------------------------------------------------------  [facility] EEG Reading Room Ph#: [phone]  Epilepsy Answering Service after 5PM and before 8:30AM: Ph#: [phone]    Vamshi Michelle M.D, epilepsy fellow    Maya Calderon MD  Attending Physician, [facility]

## 2023-06-02 NOTE — PROGRESS NOTE ADULT - SUBJECTIVE AND OBJECTIVE BOX
HPI:  83-year-old male PMH of hypertension,  hyperlipidemia, diabetes,  right hip fracture status post repair as well as 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  Patient has not been doing well since February into March status post surgery however worsening cognitively decline started occurring around April as per son.  Patient currently seems confused unable to tell time or place.  Per ER discussions, family members do not want HD as a treatment option.    (29 May 2023 20:30)  Patient is a 83y old  Male who presents with a chief complaint of AMS (02 Jun 2023 14:48)      INTERVAL HPI/OVERNIGHT EVENTS: no acute events patient remains confused     MEDICATIONS  (STANDING):  aMIOdarone    Tablet 200 milliGRAM(s) Oral daily  apixaban 2.5 milliGRAM(s) Oral every 12 hours  aspirin enteric coated 81 milliGRAM(s) Oral daily  atorvastatin 40 milliGRAM(s) Oral at bedtime  brimonidine 0.2% Ophthalmic Solution 1 Drop(s) Both EYES two times a day  dorzolamide 2%/timolol 0.5% Ophthalmic Solution 1 Drop(s) Both EYES two times a day  hydrALAZINE 25 milliGRAM(s) Oral three times a day  isosorbide   mononitrate ER Tablet (IMDUR) 60 milliGRAM(s) Oral daily  multivitamin/minerals 1 Tablet(s) Oral daily  OLANZapine 7.5 milliGRAM(s) Oral <User Schedule>  OLANZapine 2.5 milliGRAM(s) Oral <User Schedule>  pantoprazole    Tablet 40 milliGRAM(s) Oral before breakfast  polyethylene glycol 3350 17 Gram(s) Oral two times a day  senna 2 Tablet(s) Oral at bedtime  sodium bicarbonate 650 milliGRAM(s) Oral three times a day    MEDICATIONS  (PRN):  LORazepam   Injectable 2 milliGRAM(s) IV Push every 6 hours PRN Agitation  OLANZapine 2.5 milliGRAM(s) Oral every 12 hours PRN agitation      Allergies    No Known Allergies    Intolerances        REVIEW OF SYSTEMS:  unable to provide     Vital Signs Last 24 Hrs  T(C): 36.3 (02 Jun 2023 11:14), Max: 36.6 (01 Jun 2023 22:21)  T(F): 97.3 (02 Jun 2023 11:14), Max: 97.9 (01 Jun 2023 22:21)  HR: 60 (02 Jun 2023 11:14) (60 - 104)  BP: 144/64 (02 Jun 2023 11:14) (108/58 - 144/64)  RR: 18 (02 Jun 2023 11:14) (16 - 18)  SpO2: 97% (02 Jun 2023 11:14) (94% - 98%)    Parameters below as of 02 Jun 2023 05:31  Patient On (Oxygen Delivery Method): room air        PHYSICAL EXAM:  GENERAL: NAD  HEAD:  Atraumatic, Normocephalic  EYES: EOMI, PERRLA, conjunctiva and sclera clear  ENMT: No tonsillar erythema, exudates, or enlargement; Moist mucous membranes, Good dentition, No lesions  NECK: Supple, No JVD, Normal thyroid  NERVOUS SYSTEM:  Alert & awake   CHEST/LUNG: Clear to ascultation  bilaterally; No rales, rhonchi, wheezing, or rubs  HEART: Regular rate and rhythm; No murmurs, rubs, or gallops  ABDOMEN: Soft, Nontender, Nondistended; Bowel sounds present  EXTREMITIES:  2+ Peripheral Pulses, No clubbing, cyanosis, or edema  LYMPH: No lymphadenopathy noted  SKIN: No rashes or lesions    LABS:                        11.7   9.96  )-----------( 218      ( 01 Jun 2023 10:00 )             38.0     06-01    142  |  114<H>  |  64<H>  ----------------------------<  147<H>  5.0   |  20<L>  |  5.12<H>    Ca    8.4<L>      01 Jun 2023 07:35    TPro  5.9<L>  /  Alb  2.1<L>  /  TBili  0.5  /  DBili  x   /  AST  15  /  ALT  15  /  AlkPhos  74  06-01        CAPILLARY BLOOD GLUCOSE      POCT Blood Glucose.: 161 mg/dL (02 Jun 2023 17:02)  POCT Blood Glucose.: 171 mg/dL (02 Jun 2023 11:54)  POCT Blood Glucose.: 163 mg/dL (02 Jun 2023 07:48)  POCT Blood Glucose.: 143 mg/dL (01 Jun 2023 22:50)      RADIOLOGY & ADDITIONAL TESTS:  < from: CT Head No Cont (05.29.23 @ 19:13) >  IMPRESSION:  Degraded by motion. No evidence of an acute intracranial hemorrhage,   midline shift, or hydrocephalus. 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    < end of copied text >  < from: Xray Chest 1 View AP/PA. (05.29.23 @ 22:18) >  HEART/VASCULAR: The heart is mildly enlarged in transverse diameter.    BONES: Visualized osseous thorax intact.    IMPRESSION:   No radiographic evidence of active chest disease.    < end of copied text >    Imaging Personally Reviewed:  [ ] YES  [ ] NO    Consultant(s) Notes Reviewed:  [ ] YES  [ ] NO    Care Discussed with Consultants/Other Providers [ ] YES  [ ] NO

## 2023-06-02 NOTE — PROGRESS NOTE ADULT - SUBJECTIVE AND OBJECTIVE BOX
This is in follow-up to my initial Neurology Consult note of 5/30/23, and Progress Note of 6/1/23.      Recap of results of recommended tests:    B12/folate  924/7.0   methylmalonic acid (MMA)/homocysteine (Hcy)    Still pending/27.5  TSH  1.45  25-OH vit D level  9.5   ESR/CRP 27/<3.    Routine EEG:  "FINDINGS:    The background was continuous and symmetric. The predominant background consisted of diffuse polymorphic delta frequencies. Possible fragments of a 7-Hz posterior dominant rhythm later in the study.    Background Slowing:  Generalized slowing: As above  Focal slowing: none was present.    Sleep Background:  No clear state change captured.    Epileptiform Activity:   No interictal epileptiform discharges were present.    Events:  No clinical events were recorded.  No seizures were recorded.    Activation Procedures:   -Hyperventilation was not performed.    -Photic stimulation was performed and did not elicit any abnormalities.      Artifacts:  Continuous myogenic, motion, and electrical artifacts limited interpretation.    EKG:  Single-lead EKG showed irregular rhythm at times, heart rate 100-110 bpm.    -----------------------------------------------------------------------------------------------------    EEG Classification / Summary:  Abnormal routine EEG study in a lethargic patient  Background slowing, generalized, moderate  Interpretation limited due to continuous myogenic, motion, and electrical artifacts.    -----------------------------------------------------------------------------------------------------    Clinical Impression:  Interpretation limited due to continuous artifacts.  Moderate diffuse/multifocal cerebral dysfunction, not specific as to etiology.  Single-lead EKG showed irregular rhythm at times."

## 2023-06-02 NOTE — PROGRESS NOTE ADULT - SUBJECTIVE AND OBJECTIVE BOX
[facility] NEPHROLOGY SERVICES, [facility]  NEPHROLOGY AND HYPERTENSION  [address]  [address]  [place] [address]  [phone]    MD JOSE CAMERON MD YELENA ROSENBERG, MD BINNY KOSHY, MD CHRISTOPHER CAPUTO, MD EDWARD BOVER, MD          Patient events noted    MEDICATIONS  (STANDING):  aMIOdarone    Tablet 200 milliGRAM(s) Oral daily  apixaban 2.5 milliGRAM(s) Oral every 12 hours  aspirin enteric coated 81 milliGRAM(s) Oral daily  atorvastatin 40 milliGRAM(s) Oral at bedtime  brimonidine 0.2% Ophthalmic Solution 1 Drop(s) Both EYES two times a day  dorzolamide 2%/timolol 0.5% Ophthalmic Solution 1 Drop(s) Both EYES two times a day  hydrALAZINE 25 milliGRAM(s) Oral three times a day  isosorbide   mononitrate ER Tablet (IMDUR) 60 milliGRAM(s) Oral daily  multivitamin/minerals 1 Tablet(s) Oral daily  OLANZapine 7.5 milliGRAM(s) Oral <User Schedule>  OLANZapine 2.5 milliGRAM(s) Oral <User Schedule>  pantoprazole    Tablet 40 milliGRAM(s) Oral before breakfast  polyethylene glycol 3350 17 Gram(s) Oral two times a day  senna 2 Tablet(s) Oral at bedtime  sodium bicarbonate 650 milliGRAM(s) Oral three times a day    MEDICATIONS  (PRN):  LORazepam   Injectable 2 milliGRAM(s) IV Push every 6 hours PRN Agitation  OLANZapine 2.5 milliGRAM(s) Oral every 12 hours PRN agitation      PHYSICAL EXAM:      T(C): 36.3 (06-02-23 @ 11:14), Max: 36.8 (06-01-23 @ 15:56)  HR: 60 (06-02-23 @ 11:14) (60 - 110)  BP: 144/64 (06-02-23 @ 11:14) (108/58 - 144/64)  RR: 18 (06-02-23 @ 11:14) (16 - 18)  SpO2: 97% (06-02-23 @ 11:14) (94% - 98%)  Wt(kg): --  Lungs clear  Heart S1S2  Abd soft NT ND  Extremities:   tr edema                                    11.7   9.96  )-----------( 218      ( 01 Jun 2023 10:00 )             38.0     06-01    142  |  114<H>  |  64<H>  ----------------------------<  147<H>  5.0   |  20<L>  |  5.12<H>    Ca    8.4<L>      01 Jun 2023 07:35    TPro  5.9<L>  /  Alb  2.1<L>  /  TBili  0.5  /  DBili  x   /  AST  15  /  ALT  15  /  AlkPhos  74  06-01      LIVER FUNCTIONS - ( 01 Jun 2023 07:35 )  Alb: 2.1 g/dL / Pro: 5.9 gm/dL / ALK PHOS: 74 U/L / ALT: 15 U/L / AST: 15 U/L / GGT: x           Creatinine Trend: 5.12<--, 5.00<--, 5.09<--, 4.74<--, 4.86<--          Assessment   CKD 5; hyperkalemia  Multifactorial change in MS;  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

## 2023-06-03 ENCOUNTER — TRANSCRIPTION ENCOUNTER (OUTPATIENT)
Age: 83
End: 2023-06-03

## 2023-06-03 LAB
ANION GAP SERPL CALC-SCNC: 12 MMOL/L — SIGNIFICANT CHANGE UP (ref 5–17)
ANION GAP SERPL CALC-SCNC: 9 MMOL/L — SIGNIFICANT CHANGE UP (ref 5–17)
BUN SERPL-MCNC: 73 MG/DL — HIGH (ref 7–23)
BUN SERPL-MCNC: 88 MG/DL — HIGH (ref 7–23)
CALCIUM SERPL-MCNC: 8.6 MG/DL — SIGNIFICANT CHANGE UP (ref 8.5–10.1)
CALCIUM SERPL-MCNC: 9.1 MG/DL — SIGNIFICANT CHANGE UP (ref 8.5–10.1)
CHLORIDE SERPL-SCNC: 116 MMOL/L — HIGH (ref 96–108)
CHLORIDE SERPL-SCNC: 117 MMOL/L — HIGH (ref 96–108)
CO2 SERPL-SCNC: 17 MMOL/L — LOW (ref 22–31)
CO2 SERPL-SCNC: 18 MMOL/L — LOW (ref 22–31)
CREAT SERPL-MCNC: 6.03 MG/DL — HIGH (ref 0.5–1.3)
CREAT SERPL-MCNC: 6.77 MG/DL — HIGH (ref 0.5–1.3)
EGFR: 8 ML/MIN/1.73M2 — LOW
EGFR: 9 ML/MIN/1.73M2 — LOW
GLUCOSE BLDC GLUCOMTR-MCNC: 80 MG/DL — SIGNIFICANT CHANGE UP (ref 70–99)
GLUCOSE BLDC GLUCOMTR-MCNC: 84 MG/DL — SIGNIFICANT CHANGE UP (ref 70–99)
GLUCOSE SERPL-MCNC: 110 MG/DL — HIGH (ref 70–99)
GLUCOSE SERPL-MCNC: 124 MG/DL — HIGH (ref 70–99)
HCT VFR BLD CALC: 41.5 % — SIGNIFICANT CHANGE UP (ref 39–50)
HGB BLD-MCNC: 12.7 G/DL — LOW (ref 13–17)
MAGNESIUM SERPL-MCNC: 2.3 MG/DL — SIGNIFICANT CHANGE UP (ref 1.6–2.6)
MCHC RBC-ENTMCNC: 26.6 PG — LOW (ref 27–34)
MCHC RBC-ENTMCNC: 30.6 G/DL — LOW (ref 32–36)
MCV RBC AUTO: 87 FL — SIGNIFICANT CHANGE UP (ref 80–100)
NRBC # BLD: 0 /100 WBCS — SIGNIFICANT CHANGE UP (ref 0–0)
PHOSPHATE SERPL-MCNC: 5.1 MG/DL — HIGH (ref 2.5–4.5)
PHOSPHATE SERPL-MCNC: 5.6 MG/DL — HIGH (ref 2.5–4.5)
PLATELET # BLD AUTO: 211 K/UL — SIGNIFICANT CHANGE UP (ref 150–400)
POTASSIUM SERPL-MCNC: 5.4 MMOL/L — HIGH (ref 3.5–5.3)
POTASSIUM SERPL-MCNC: 6.3 MMOL/L — CRITICAL HIGH (ref 3.5–5.3)
POTASSIUM SERPL-SCNC: 5.4 MMOL/L — HIGH (ref 3.5–5.3)
POTASSIUM SERPL-SCNC: 6.3 MMOL/L — CRITICAL HIGH (ref 3.5–5.3)
RBC # BLD: 4.77 M/UL — SIGNIFICANT CHANGE UP (ref 4.2–5.8)
RBC # FLD: 15.3 % — HIGH (ref 10.3–14.5)
SODIUM SERPL-SCNC: 142 MMOL/L — SIGNIFICANT CHANGE UP (ref 135–145)
SODIUM SERPL-SCNC: 147 MMOL/L — HIGH (ref 135–145)
WBC # BLD: 1.03 K/UL — LOW (ref 3.8–10.5)
WBC # FLD AUTO: 1.03 K/UL — LOW (ref 3.8–10.5)

## 2023-06-03 PROCEDURE — 71045 X-RAY EXAM CHEST 1 VIEW: CPT | Mod: 26

## 2023-06-03 PROCEDURE — 99233 SBSQ HOSP IP/OBS HIGH 50: CPT

## 2023-06-03 RX ORDER — SEVELAMER CARBONATE 2400 MG/1
1 POWDER, FOR SUSPENSION ORAL
Qty: 90 | Refills: 0
Start: 2023-06-03 | End: 2023-07-02

## 2023-06-03 RX ORDER — CALCIUM GLUCONATE 100 MG/ML
2 VIAL (ML) INTRAVENOUS ONCE
Refills: 0 | Status: COMPLETED | OUTPATIENT
Start: 2023-06-03 | End: 2023-06-03

## 2023-06-03 RX ORDER — ASPIRIN/CALCIUM CARB/MAGNESIUM 324 MG
1 TABLET ORAL
Qty: 30 | Refills: 0
Start: 2023-06-03 | End: 2023-07-02

## 2023-06-03 RX ORDER — SODIUM ZIRCONIUM CYCLOSILICATE 10 G/10G
5 POWDER, FOR SUSPENSION ORAL
Qty: 210 | Refills: 0
Start: 2023-06-03 | End: 2023-06-16

## 2023-06-03 RX ORDER — SEVELAMER CARBONATE 2400 MG/1
800 POWDER, FOR SUSPENSION ORAL EVERY 8 HOURS
Refills: 0 | Status: COMPLETED | OUTPATIENT
Start: 2023-06-03 | End: 2023-06-04

## 2023-06-03 RX ORDER — ASPIRIN/CALCIUM CARB/MAGNESIUM 324 MG
81 TABLET ORAL DAILY
Refills: 0 | Status: DISCONTINUED | OUTPATIENT
Start: 2023-06-03 | End: 2023-06-05

## 2023-06-03 RX ORDER — MULTIVIT-MIN/FERROUS GLUCONATE 9 MG/15 ML
1 LIQUID (ML) ORAL
Qty: 0 | Refills: 0 | DISCHARGE
Start: 2023-06-03

## 2023-06-03 RX ORDER — INSULIN HUMAN 100 [IU]/ML
5 INJECTION, SOLUTION SUBCUTANEOUS ONCE
Refills: 0 | Status: COMPLETED | OUTPATIENT
Start: 2023-06-03 | End: 2023-06-03

## 2023-06-03 RX ORDER — OLANZAPINE 15 MG/1
1 TABLET, FILM COATED ORAL
Qty: 28 | Refills: 0
Start: 2023-06-03 | End: 2023-06-16

## 2023-06-03 RX ORDER — SODIUM ZIRCONIUM CYCLOSILICATE 10 G/10G
10 POWDER, FOR SUSPENSION ORAL EVERY 8 HOURS
Refills: 0 | Status: COMPLETED | OUTPATIENT
Start: 2023-06-03 | End: 2023-06-03

## 2023-06-03 RX ORDER — DEXTROSE 50 % IN WATER 50 %
25 SYRINGE (ML) INTRAVENOUS ONCE
Refills: 0 | Status: COMPLETED | OUTPATIENT
Start: 2023-06-03 | End: 2023-06-03

## 2023-06-03 RX ADMIN — SEVELAMER CARBONATE 800 MILLIGRAM(S): 2400 POWDER, FOR SUSPENSION ORAL at 22:49

## 2023-06-03 RX ADMIN — BRIMONIDINE TARTRATE 1 DROP(S): 2 SOLUTION/ DROPS OPHTHALMIC at 17:11

## 2023-06-03 RX ADMIN — OLANZAPINE 2.5 MILLIGRAM(S): 15 TABLET, FILM COATED ORAL at 07:42

## 2023-06-03 RX ADMIN — INSULIN HUMAN 5 UNIT(S): 100 INJECTION, SOLUTION SUBCUTANEOUS at 11:23

## 2023-06-03 RX ADMIN — Medication 25 MILLILITER(S): at 11:19

## 2023-06-03 RX ADMIN — PANTOPRAZOLE SODIUM 40 MILLIGRAM(S): 20 TABLET, DELAYED RELEASE ORAL at 07:42

## 2023-06-03 RX ADMIN — Medication 81 MILLIGRAM(S): at 22:53

## 2023-06-03 RX ADMIN — Medication 650 MILLIGRAM(S): at 22:49

## 2023-06-03 RX ADMIN — SODIUM ZIRCONIUM CYCLOSILICATE 10 GRAM(S): 10 POWDER, FOR SUSPENSION ORAL at 22:49

## 2023-06-03 RX ADMIN — DORZOLAMIDE HYDROCHLORIDE TIMOLOL MALEATE 1 DROP(S): 20; 5 SOLUTION/ DROPS OPHTHALMIC at 17:11

## 2023-06-03 RX ADMIN — ATORVASTATIN CALCIUM 40 MILLIGRAM(S): 80 TABLET, FILM COATED ORAL at 22:49

## 2023-06-03 RX ADMIN — Medication 200 GRAM(S): at 11:19

## 2023-06-03 RX ADMIN — Medication 1 TABLET(S): at 12:13

## 2023-06-03 RX ADMIN — Medication 2 MILLIGRAM(S): at 01:42

## 2023-06-03 RX ADMIN — Medication 25 MILLIGRAM(S): at 22:49

## 2023-06-03 RX ADMIN — SENNA PLUS 2 TABLET(S): 8.6 TABLET ORAL at 22:50

## 2023-06-03 NOTE — DISCHARGE NOTE PROVIDER - NSDCMRMEDTOKEN_GEN_ALL_CORE_FT
amiodarone 200 mg oral tablet: 1 tab(s) orally once a day  apixaban 2.5 mg oral tablet: 1 tab(s) orally every 12 hours  aspirin 81 mg oral tablet, chewable: 1 tab(s) orally once a day  atorvastatin 40 mg oral tablet: 1 tab(s) orally once a day (at bedtime)  brimonidine 0.2% ophthalmic solution: 1 drop(s) to each affected eye 2 times a day  dorzolamide-timolol 2%-0.5% preservative-free ophthalmic solution: 1 drop(s) to each affected eye 2 times a day  hydrALAZINE 25 mg oral tablet: 3 tab(s) orally 3 times a day  isosorbide mononitrate 60 mg oral tablet, extended release: 1 tab(s) orally once a day  Lokelma 5 g oral powder for reconstitution: 5 gram(s) orally 3 times a day  Multiple Vitamins oral tablet: 1 tab(s) orally once a day  Multiple Vitamins with Minerals oral tablet: 1 tab(s) orally once a day  pantoprazole 40 mg oral delayed release tablet: 1 tab(s) orally once a day (before a meal)  prednisoLONE acetate 1% ophthalmic suspension: 1 drop(s) to each affected eye 3 times a day  sevelamer carbonate 800 mg oral tablet: 1 tab(s) orally every 8 hours  sodium bicarbonate 650 mg oral tablet: 2 tab(s) orally 2 times a day  ZyPREXA 2.5 mg oral tablet: 1 tab(s) orally 2 times a day as needed for  agitation   amiodarone 200 mg oral tablet: 1 tab(s) orally once a day  apixaban 2.5 mg oral tablet: 1 tab(s) orally every 12 hours  aspirin 81 mg oral tablet, chewable: 1 tab(s) orally once a day  atorvastatin 40 mg oral tablet: 1 tab(s) orally once a day (at bedtime)  azithromycin 500 mg oral tablet: 1 tab(s) orally once a day  brimonidine 0.2% ophthalmic solution: 1 drop(s) to each affected eye 2 times a day  cefpodoxime 200 mg oral tablet: 1 tab(s) orally 2 times a day  dorzolamide-timolol 2%-0.5% preservative-free ophthalmic solution: 1 drop(s) to each affected eye 2 times a day  hydrALAZINE 25 mg oral tablet: 3 tab(s) orally 3 times a day  isosorbide mononitrate 60 mg oral tablet, extended release: 1 tab(s) orally once a day  Lokelma 5 g oral powder for reconstitution: 5 gram(s) orally 3 times a day  Multiple Vitamins with Minerals oral tablet: 1 tab(s) orally once a day  pantoprazole 40 mg oral delayed release tablet: 1 tab(s) orally once a day (before a meal)  prednisoLONE acetate 1% ophthalmic suspension: 1 drop(s) to each affected eye 3 times a day  sevelamer carbonate 800 mg oral tablet: 1 tab(s) orally every 8 hours  sodium bicarbonate 650 mg oral tablet: 2 tab(s) orally 2 times a day  ZyPREXA 2.5 mg oral tablet: 1 tab(s) orally 2 times a day as needed for  agitation

## 2023-06-03 NOTE — PROGRESS NOTE ADULT - ASSESSMENT
81 yo male w/ pmhx of CKD, DM, HTN presented to the ED BIBEMS 2/2 due to fall.    Acute hypoxic respiratory faliure  - Patient required NRB 10L.   - Now downtitrated to NC 4L.  - F/u CXR r/o PNA    Hyperkalemia  - s/p temporizing measures  - nephro following  - now resolved    CKD5  - Appreciate nephro recs  - no plans for HD    Plan: Right hip femoral fracture due to fall. Had right hip surgery. Stable in bed.  CT chest no pneumonia.      COVID- 19 :  No evidence of Hypoxia, no treatment needed.     Elevated Troponin; continue to trend, unable to assess if any chest pain, patient is currently confused. TTE decreased EF 40 %. EKG notes inferior and septal MI. CHF, euvolemic.      DM: stable, hold all meds. SSC.     Essential HTN: resume home medication lopressor, lipitor and Imdur.  Hydralazine, and Norvasc.    Only on Amiodarone now.          Dispo- Home hospice. Pending oxygen setup. Discussed with CM on 6/3

## 2023-06-03 NOTE — DISCHARGE NOTE PROVIDER - DETAILS OF MALNUTRITION DIAGNOSIS/DIAGNOSES
This patient has been assessed with a concern for Malnutrition and was treated during this hospitalization for the following Nutrition diagnosis/diagnoses:     -  05/30/2023: Severe protein-calorie malnutrition   -  05/30/2023: Underweight (BMI < 19)

## 2023-06-03 NOTE — PROGRESS NOTE ADULT - SUBJECTIVE AND OBJECTIVE BOX
Patient is a 83y old  Male who presents with a chief complaint of AMS (03 Jun 2023 13:13)    INTERVAL HPI/OVERNIGHT EVENTS: Overnight, patient had desatted and required NRB 10L. Now downtitrated to NC 4L. Satting well.     MEDICATIONS  (STANDING):  aMIOdarone    Tablet 200 milliGRAM(s) Oral daily  apixaban 2.5 milliGRAM(s) Oral every 12 hours  aspirin  chewable 81 milliGRAM(s) Oral daily  atorvastatin 40 milliGRAM(s) Oral at bedtime  brimonidine 0.2% Ophthalmic Solution 1 Drop(s) Both EYES two times a day  dorzolamide 2%/timolol 0.5% Ophthalmic Solution 1 Drop(s) Both EYES two times a day  hydrALAZINE 25 milliGRAM(s) Oral three times a day  isosorbide   mononitrate ER Tablet (IMDUR) 60 milliGRAM(s) Oral daily  multivitamin/minerals 1 Tablet(s) Oral daily  OLANZapine 7.5 milliGRAM(s) Oral <User Schedule>  OLANZapine 2.5 milliGRAM(s) Oral <User Schedule>  pantoprazole    Tablet 40 milliGRAM(s) Oral before breakfast  polyethylene glycol 3350 17 Gram(s) Oral two times a day  senna 2 Tablet(s) Oral at bedtime  sevelamer carbonate 800 milliGRAM(s) Oral every 8 hours  sodium bicarbonate 650 milliGRAM(s) Oral three times a day  sodium zirconium cyclosilicate 10 Gram(s) Oral every 8 hours    MEDICATIONS  (PRN):  LORazepam   Injectable 2 milliGRAM(s) IV Push every 6 hours PRN Agitation  OLANZapine 2.5 milliGRAM(s) Oral every 12 hours PRN agitation      Allergies    No Known Allergies    Intolerances        REVIEW OF SYSTEMS: all negative with exception of above    Vital Signs Last 24 Hrs  T(C): 37 (03 Jun 2023 11:28), Max: 37.2 (02 Jun 2023 18:26)  T(F): 98.6 (03 Jun 2023 11:28), Max: 99 (03 Jun 2023 09:03)  HR: 101 (03 Jun 2023 11:28) (55 - 115)  BP: 127/80 (03 Jun 2023 11:28) (111/79 - 127/80)  BP(mean): --  RR: 28 (03 Jun 2023 11:28) (18 - 28)  SpO2: 94% (03 Jun 2023 11:28) (92% - 100%)    Parameters below as of 03 Jun 2023 09:03  Patient On (Oxygen Delivery Method): mask, nonrebreather  O2 Flow (L/min): 10      PHYSICAL EXAM:  GENERAL: NAD  NERVOUS SYSTEM:  Alert & awake   CHEST/LUNG: Clear to ascultation  bilaterally; No rales, rhonchi, wheezing, or rubs  HEART: Regular rate and rhythm; No murmurs, rubs, or gallops  ABDOMEN: Soft, Nontender, Nondistended; Bowel sounds present  EXTREMITIES:  2+ Peripheral Pulses, No clubbing, cyanosis, or edema    LABS:                        12.7   1.03  )-----------( 211      ( 03 Jun 2023 05:30 )             41.5     06-03    147<H>  |  117<H>  |  88<H>  ----------------------------<  110<H>  5.4<H>   |  18<L>  |  6.77<H>    Ca    9.1      03 Jun 2023 12:28  Phos  5.1     06-03  Mg     2.3     06-03          CAPILLARY BLOOD GLUCOSE      POCT Blood Glucose.: 84 mg/dL (03 Jun 2023 11:20)  POCT Blood Glucose.: 161 mg/dL (02 Jun 2023 17:02)      RADIOLOGY & ADDITIONAL TESTS:    Imaging Personally Reviewed:  [ ] YES  [ ] NO    Consultant(s) Notes Reviewed:  [ ] YES  [ ] NO    Care Discussed with Consultants/Other Providers [ ] YES  [ ] NO

## 2023-06-03 NOTE — DISCHARGE NOTE PROVIDER - HOSPITAL COURSE
81 yo male w/ pmhx of CKD, DM, HTN presented to the ED [facility] 2/2 due to fall. Patient was found to have acute renal failure and hyperkalemia. Nephro was consulted. Pallaitive was consulted. Patient declined HD. Family opted for home w/ hospice.    81 yo male w/ pmhx of CKD, DM, HTN presented to the ED [facility] 2/2 due to fall. Patient was found to have acute renal failure and hyperkalemia. Nephro was consulted. Hospital c/b pneumonia requiring abx and oxygen. Palliative was consulted. Patient declined HD. Family opted for home w/ hospice.

## 2023-06-03 NOTE — DISCHARGE NOTE PROVIDER - CARE PROVIDER_API CALL
Stanley Haro  Nephrology  [address] [facility], [address] [place] [id number]  Phone: [phone]  Fax: [phone]  Follow Up Time:

## 2023-06-03 NOTE — DISCHARGE NOTE PROVIDER - ATTENDING DISCHARGE PHYSICAL EXAMINATION:
Vital Signs Last 24 Hrs  T(F): 98.6 (03 Jun 2023 11:28), Max: 99 (03 Jun 2023 09:03)  HR: 101 (03 Jun 2023 11:28) (55 - 115)  BP: 127/80 (03 Jun 2023 11:28) (111/79 - 127/80)  RR: 28 (03 Jun 2023 11:28) (18 - 28)  SpO2: 94% (03 Jun 2023 11:28) (92% - 100%)    Physical Exam:  Constitutional: NAD, awake and alert  Respiratory: cta b/l no wheezing no rhonchi  Cardiovascular: +s1/s2 no edema b/l le  Gastrointestinal: soft nt nd bs+  Neurological: A/O x 3, no focal deficits Vital Signs Last 24 Hrs  T(F): 98.6 (03 Jun 2023 11:28), Max: 99 (03 Jun 2023 09:03)  HR: 101 (03 Jun 2023 11:28) (55 - 115)  BP: 127/80 (03 Jun 2023 11:28) (111/79 - 127/80)  RR: 28 (03 Jun 2023 11:28) (18 - 28)  SpO2: 94% (03 Jun 2023 11:28) (92% - 100%)    Physical Exam:  GENERAL: NAD, ill-appearing  NERVOUS SYSTEM:  Alert & awake   CHEST/LUNG: Clear to ascultation  bilaterally; No rales, rhonchi, wheezing, or rubs  HEART: Regular rate and rhythm; No murmurs, rubs, or gallops  ABDOMEN: Soft, Nontender, Nondistended; Bowel sounds present  EXTREMITIES:  2+ Peripheral Pulses, No clubbing, cyanosis, or edema

## 2023-06-03 NOTE — DISCHARGE NOTE PROVIDER - NSDCCPCAREPLAN_GEN_ALL_CORE_FT
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      SECONDARY DISCHARGE DIAGNOSES  Diagnosis: Stage 5 chronic kidney disease not on chronic dialysis  Assessment and Plan of Treatment:      PRINCIPAL DISCHARGE DIAGNOSIS  Diagnosis: Stage 5 chronic kidney disease not on chronic dialysis  Assessment and Plan of Treatment: Please continue with home hospice.      SECONDARY DISCHARGE DIAGNOSES  Diagnosis: Stage 5 chronic kidney disease not on chronic dialysis  Assessment and Plan of Treatment:     Diagnosis: Pneumonia, aspiration  Assessment and Plan of Treatment: Please continue taking antibiotics as prescribed.

## 2023-06-04 LAB
ALBUMIN SERPL ELPH-MCNC: 1.8 G/DL — LOW (ref 3.3–5)
ALP SERPL-CCNC: 62 U/L — SIGNIFICANT CHANGE UP (ref 40–120)
ALT FLD-CCNC: 1103 U/L — HIGH (ref 12–78)
ANION GAP SERPL CALC-SCNC: 9 MMOL/L — SIGNIFICANT CHANGE UP (ref 5–17)
AST SERPL-CCNC: 1611 U/L — HIGH (ref 15–37)
BILIRUB SERPL-MCNC: 1 MG/DL — SIGNIFICANT CHANGE UP (ref 0.2–1.2)
BUN SERPL-MCNC: 105 MG/DL — HIGH (ref 7–23)
CALCIUM SERPL-MCNC: 8.3 MG/DL — LOW (ref 8.5–10.1)
CHLORIDE SERPL-SCNC: 113 MMOL/L — HIGH (ref 96–108)
CO2 SERPL-SCNC: 18 MMOL/L — LOW (ref 22–31)
CREAT SERPL-MCNC: 7.94 MG/DL — HIGH (ref 0.5–1.3)
EGFR: 6 ML/MIN/1.73M2 — LOW
GLUCOSE BLDC GLUCOMTR-MCNC: 151 MG/DL — HIGH (ref 70–99)
GLUCOSE BLDC GLUCOMTR-MCNC: 155 MG/DL — HIGH (ref 70–99)
GLUCOSE BLDC GLUCOMTR-MCNC: 168 MG/DL — HIGH (ref 70–99)
GLUCOSE BLDC GLUCOMTR-MCNC: 177 MG/DL — HIGH (ref 70–99)
GLUCOSE BLDC GLUCOMTR-MCNC: 192 MG/DL — HIGH (ref 70–99)
GLUCOSE SERPL-MCNC: 150 MG/DL — HIGH (ref 70–99)
HCT VFR BLD CALC: 35 % — LOW (ref 39–50)
HGB BLD-MCNC: 10.9 G/DL — LOW (ref 13–17)
MCHC RBC-ENTMCNC: 27 PG — SIGNIFICANT CHANGE UP (ref 27–34)
MCHC RBC-ENTMCNC: 31.1 G/DL — LOW (ref 32–36)
MCV RBC AUTO: 86.8 FL — SIGNIFICANT CHANGE UP (ref 80–100)
METHYLMALONATE SERPL-SCNC: 332 NMOL/L — SIGNIFICANT CHANGE UP (ref 0–378)
NRBC # BLD: 0 /100 WBCS — SIGNIFICANT CHANGE UP (ref 0–0)
PLATELET # BLD AUTO: 184 K/UL — SIGNIFICANT CHANGE UP (ref 150–400)
POTASSIUM SERPL-MCNC: 6.7 MMOL/L — CRITICAL HIGH (ref 3.5–5.3)
POTASSIUM SERPL-SCNC: 6.7 MMOL/L — CRITICAL HIGH (ref 3.5–5.3)
PROT SERPL-MCNC: 5.8 GM/DL — LOW (ref 6–8.3)
RBC # BLD: 4.03 M/UL — LOW (ref 4.2–5.8)
RBC # FLD: 15.3 % — HIGH (ref 10.3–14.5)
SODIUM SERPL-SCNC: 140 MMOL/L — SIGNIFICANT CHANGE UP (ref 135–145)
WBC # BLD: 9.96 K/UL — SIGNIFICANT CHANGE UP (ref 3.8–10.5)
WBC # FLD AUTO: 9.96 K/UL — SIGNIFICANT CHANGE UP (ref 3.8–10.5)

## 2023-06-04 PROCEDURE — 99233 SBSQ HOSP IP/OBS HIGH 50: CPT

## 2023-06-04 RX ORDER — SODIUM POLYSTYRENE SULFONATE 4.1 MEQ/G
30 POWDER, FOR SUSPENSION ORAL ONCE
Refills: 0 | Status: COMPLETED | OUTPATIENT
Start: 2023-06-04 | End: 2023-06-04

## 2023-06-04 RX ORDER — CEFTRIAXONE 500 MG/1
INJECTION, POWDER, FOR SOLUTION INTRAMUSCULAR; INTRAVENOUS
Refills: 0 | Status: DISCONTINUED | OUTPATIENT
Start: 2023-06-04 | End: 2023-06-05

## 2023-06-04 RX ORDER — AZITHROMYCIN 500 MG/1
500 TABLET, FILM COATED ORAL EVERY 24 HOURS
Refills: 0 | Status: DISCONTINUED | OUTPATIENT
Start: 2023-06-04 | End: 2023-06-05

## 2023-06-04 RX ORDER — DEXTROSE 50 % IN WATER 50 %
25 SYRINGE (ML) INTRAVENOUS ONCE
Refills: 0 | Status: COMPLETED | OUTPATIENT
Start: 2023-06-04 | End: 2023-06-04

## 2023-06-04 RX ORDER — INSULIN HUMAN 100 [IU]/ML
5 INJECTION, SOLUTION SUBCUTANEOUS ONCE
Refills: 0 | Status: COMPLETED | OUTPATIENT
Start: 2023-06-04 | End: 2023-06-04

## 2023-06-04 RX ORDER — CEFTRIAXONE 500 MG/1
1000 INJECTION, POWDER, FOR SOLUTION INTRAMUSCULAR; INTRAVENOUS ONCE
Refills: 0 | Status: COMPLETED | OUTPATIENT
Start: 2023-06-04 | End: 2023-06-04

## 2023-06-04 RX ORDER — AZITHROMYCIN 500 MG/1
500 TABLET, FILM COATED ORAL DAILY
Refills: 0 | Status: DISCONTINUED | OUTPATIENT
Start: 2023-06-04 | End: 2023-06-04

## 2023-06-04 RX ORDER — CEFTRIAXONE 500 MG/1
1000 INJECTION, POWDER, FOR SOLUTION INTRAMUSCULAR; INTRAVENOUS EVERY 24 HOURS
Refills: 0 | Status: DISCONTINUED | OUTPATIENT
Start: 2023-06-05 | End: 2023-06-05

## 2023-06-04 RX ADMIN — PANTOPRAZOLE SODIUM 40 MILLIGRAM(S): 20 TABLET, DELAYED RELEASE ORAL at 08:16

## 2023-06-04 RX ADMIN — Medication 25 MILLILITER(S): at 10:57

## 2023-06-04 RX ADMIN — APIXABAN 2.5 MILLIGRAM(S): 2.5 TABLET, FILM COATED ORAL at 17:10

## 2023-06-04 RX ADMIN — Medication 650 MILLIGRAM(S): at 22:15

## 2023-06-04 RX ADMIN — AMIODARONE HYDROCHLORIDE 200 MILLIGRAM(S): 400 TABLET ORAL at 05:31

## 2023-06-04 RX ADMIN — DORZOLAMIDE HYDROCHLORIDE TIMOLOL MALEATE 1 DROP(S): 20; 5 SOLUTION/ DROPS OPHTHALMIC at 17:08

## 2023-06-04 RX ADMIN — APIXABAN 2.5 MILLIGRAM(S): 2.5 TABLET, FILM COATED ORAL at 05:31

## 2023-06-04 RX ADMIN — SEVELAMER CARBONATE 800 MILLIGRAM(S): 2400 POWDER, FOR SUSPENSION ORAL at 07:23

## 2023-06-04 RX ADMIN — POLYETHYLENE GLYCOL 3350 17 GRAM(S): 17 POWDER, FOR SOLUTION ORAL at 17:09

## 2023-06-04 RX ADMIN — Medication 25 MILLIGRAM(S): at 05:32

## 2023-06-04 RX ADMIN — Medication 25 MILLIGRAM(S): at 22:16

## 2023-06-04 RX ADMIN — ATORVASTATIN CALCIUM 40 MILLIGRAM(S): 80 TABLET, FILM COATED ORAL at 22:15

## 2023-06-04 RX ADMIN — BRIMONIDINE TARTRATE 1 DROP(S): 2 SOLUTION/ DROPS OPHTHALMIC at 17:08

## 2023-06-04 RX ADMIN — AZITHROMYCIN 255 MILLIGRAM(S): 500 TABLET, FILM COATED ORAL at 17:07

## 2023-06-04 RX ADMIN — POLYETHYLENE GLYCOL 3350 17 GRAM(S): 17 POWDER, FOR SOLUTION ORAL at 05:31

## 2023-06-04 RX ADMIN — SODIUM POLYSTYRENE SULFONATE 30 GRAM(S): 4.1 POWDER, FOR SUSPENSION ORAL at 08:15

## 2023-06-04 RX ADMIN — SENNA PLUS 2 TABLET(S): 8.6 TABLET ORAL at 22:16

## 2023-06-04 RX ADMIN — CEFTRIAXONE 100 MILLIGRAM(S): 500 INJECTION, POWDER, FOR SOLUTION INTRAMUSCULAR; INTRAVENOUS at 14:24

## 2023-06-04 RX ADMIN — Medication 650 MILLIGRAM(S): at 05:33

## 2023-06-04 RX ADMIN — INSULIN HUMAN 5 UNIT(S): 100 INJECTION, SOLUTION SUBCUTANEOUS at 11:03

## 2023-06-04 NOTE — PROGRESS NOTE ADULT - NUTRITIONAL ASSESSMENT
This patient has been assessed with a concern for Malnutrition and has been determined to have a diagnosis/diagnoses of Severe protein-calorie malnutrition and Underweight (BMI < 19).    This patient is being managed with:   Diet Minced and Moist-  Consistent Carbohydrate {Evening Snack}  60 gm Protein (PRO60G)  No Concentrated Potassium  Low Sodium  Supplement Feeding Modality:  Oral  Suplena Cans or Servings Per Day:  1       Frequency:  Daily  Entered: May 30 2023 12:39PM  

## 2023-06-04 NOTE — PROGRESS NOTE ADULT - ASSESSMENT
81 yo male w/ pmhx of CKD, DM, HTN presented to the ED [facility] 2/2 due to fall.    Acute hypoxic respiratory faliure  - Patient required NRB 10L.   - Now downtitrated to NC 4L.  - F/u CXR notable to new RUL PNA  - Started CTX/AZT    Hyperkalemia  - s/p temporizing measures  - nephro following  - now resolved    CKD5  - Appreciate nephro recs  - no plans for HD    Plan: Right hip femoral fracture due to fall. Had right hip surgery. Stable in bed.  CT chest no pneumonia.      COVID- 19 :  No evidence of Hypoxia, no treatment needed.     Elevated Troponin; continue to trend, unable to assess if any chest pain, patient is currently confused. TTE decreased EF 40 %. EKG notes inferior and septal MI. CHF, euvolemic.      DM: stable, hold all meds. SSC.     Essential HTN: resume home medication lopressor, lipitor and Imdur.  Hydralazine, and Norvasc.    Only on Amiodarone now.          Dispo- Home hospice. Pending oxygen setup. Discussed with CM on 6/3

## 2023-06-04 NOTE — PROGRESS NOTE ADULT - SUBJECTIVE AND OBJECTIVE BOX
Patient is a 83y old  Male who presents with a chief complaint of AMS (03 Jun 2023 13:28)      INTERVAL HPI/OVERNIGHT EVENTS: No acute events overnight. HD stable.     MEDICATIONS  (STANDING):  aMIOdarone    Tablet 200 milliGRAM(s) Oral daily  apixaban 2.5 milliGRAM(s) Oral every 12 hours  aspirin  chewable 81 milliGRAM(s) Oral daily  atorvastatin 40 milliGRAM(s) Oral at bedtime  brimonidine 0.2% Ophthalmic Solution 1 Drop(s) Both EYES two times a day  dorzolamide 2%/timolol 0.5% Ophthalmic Solution 1 Drop(s) Both EYES two times a day  hydrALAZINE 25 milliGRAM(s) Oral three times a day  isosorbide   mononitrate ER Tablet (IMDUR) 60 milliGRAM(s) Oral daily  multivitamin/minerals 1 Tablet(s) Oral daily  OLANZapine 2.5 milliGRAM(s) Oral <User Schedule>  OLANZapine 7.5 milliGRAM(s) Oral <User Schedule>  pantoprazole    Tablet 40 milliGRAM(s) Oral before breakfast  polyethylene glycol 3350 17 Gram(s) Oral two times a day  senna 2 Tablet(s) Oral at bedtime  sodium bicarbonate 650 milliGRAM(s) Oral three times a day    MEDICATIONS  (PRN):  LORazepam   Injectable 2 milliGRAM(s) IV Push every 6 hours PRN Agitation  OLANZapine 2.5 milliGRAM(s) Oral every 12 hours PRN agitation      Allergies    No Known Allergies    Intolerances        REVIEW OF SYSTEMS: all negative with exception of above    Vital Signs Last 24 Hrs  T(C): 36.4 (04 Jun 2023 12:55), Max: 36.8 (04 Jun 2023 08:35)  T(F): 97.6 (04 Jun 2023 12:55), Max: 98.2 (04 Jun 2023 08:35)  HR: 71 (04 Jun 2023 12:55) (70 - 103)  BP: 120/64 (04 Jun 2023 12:55) (98/64 - 127/68)  BP(mean): --  RR: 19 (04 Jun 2023 12:55) (18 - 22)  SpO2: 96% (04 Jun 2023 12:55) (95% - 99%)    Parameters below as of 04 Jun 2023 12:55  Patient On (Oxygen Delivery Method): nasal cannula        PHYSICAL EXAM:  GENERAL: NAD  NERVOUS SYSTEM:  Alert & awake   CHEST/LUNG: Clear to ascultation  bilaterally; No rales, rhonchi, wheezing, or rubs  HEART: Regular rate and rhythm; No murmurs, rubs, or gallops  ABDOMEN: Soft, Nontender, Nondistended; Bowel sounds present  EXTREMITIES:  2+ Peripheral Pulses, No clubbing, cyanosis, or edema    LABS:                        10.9   9.96  )-----------( 184      ( 04 Jun 2023 05:55 )             35.0     06-04    140  |  113<H>  |  105<H>  ----------------------------<  150<H>  6.7<HH>   |  18<L>  |  7.94<H>    Ca    8.3<L>      04 Jun 2023 05:55  Phos  5.1     06-03  Mg     2.3     06-03    TPro  5.8<L>  /  Alb  1.8<L>  /  TBili  1.0  /  DBili  x   /  AST  1611<H>  /  ALT  1103<H>  /  AlkPhos  62  06-04        CAPILLARY BLOOD GLUCOSE      POCT Blood Glucose.: 151 mg/dL (04 Jun 2023 11:18)  POCT Blood Glucose.: 177 mg/dL (04 Jun 2023 11:01)  POCT Blood Glucose.: 168 mg/dL (04 Jun 2023 08:07)  POCT Blood Glucose.: 80 mg/dL (03 Jun 2023 17:52)      RADIOLOGY & ADDITIONAL TESTS:    Imaging Personally Reviewed:  [ ] YES  [ ] NO  < from: Xray Chest 1 View- PORTABLE-Urgent (Xray Chest 1 View- PORTABLE-Urgent .) (06.03.23 @ 14:37) >  Impression:  New right upper lobe pneumonia/atelectasis.        --- End of Report ---            KAYLEIGH GREENE MD; Attending Radiologist  This document has been electronically signed. [place]  3 2023  2:49PM    < end of copied text >    Consultant(s) Notes Reviewed:  [ ] YES  [ ] NO    Care Discussed with Consultants/Other Providers [ ] YES  [ ] NO

## 2023-06-04 NOTE — PROGRESS NOTE ADULT - PROVIDER SPECIALTY LIST ADULT
Hospitalist
Hospitalist
Neurology
Hospitalist
Hospitalist
Nephrology
Nephrology
Hospitalist
Neurology
Hospitalist

## 2023-06-05 ENCOUNTER — TRANSCRIPTION ENCOUNTER (OUTPATIENT)
Age: 83
End: 2023-06-05

## 2023-06-05 VITALS
TEMPERATURE: 97 F | DIASTOLIC BLOOD PRESSURE: 79 MMHG | HEART RATE: 61 BPM | RESPIRATION RATE: 18 BRPM | OXYGEN SATURATION: 100 % | SYSTOLIC BLOOD PRESSURE: 106 MMHG

## 2023-06-05 LAB
ANION GAP SERPL CALC-SCNC: 9 MMOL/L — SIGNIFICANT CHANGE UP (ref 5–17)
BUN SERPL-MCNC: 134 MG/DL — HIGH (ref 7–23)
CALCIUM SERPL-MCNC: 7.6 MG/DL — LOW (ref 8.5–10.1)
CHLORIDE SERPL-SCNC: 112 MMOL/L — HIGH (ref 96–108)
CO2 SERPL-SCNC: 18 MMOL/L — LOW (ref 22–31)
CREAT SERPL-MCNC: 8.31 MG/DL — HIGH (ref 0.5–1.3)
EGFR: 6 ML/MIN/1.73M2 — LOW
GLUCOSE BLDC GLUCOMTR-MCNC: 247 MG/DL — HIGH (ref 70–99)
GLUCOSE BLDC GLUCOMTR-MCNC: 282 MG/DL — HIGH (ref 70–99)
GLUCOSE BLDC GLUCOMTR-MCNC: 294 MG/DL — HIGH (ref 70–99)
GLUCOSE BLDC GLUCOMTR-MCNC: 314 MG/DL — HIGH (ref 70–99)
GLUCOSE SERPL-MCNC: 205 MG/DL — HIGH (ref 70–99)
POTASSIUM SERPL-MCNC: 6.9 MMOL/L — CRITICAL HIGH (ref 3.5–5.3)
POTASSIUM SERPL-SCNC: 6.9 MMOL/L — CRITICAL HIGH (ref 3.5–5.3)
SODIUM SERPL-SCNC: 139 MMOL/L — SIGNIFICANT CHANGE UP (ref 135–145)

## 2023-06-05 PROCEDURE — 99239 HOSP IP/OBS DSCHRG MGMT >30: CPT

## 2023-06-05 RX ORDER — AZITHROMYCIN 500 MG/1
1 TABLET, FILM COATED ORAL
Qty: 2 | Refills: 0
Start: 2023-06-05 | End: 2023-06-06

## 2023-06-05 RX ORDER — CEFPODOXIME PROXETIL 100 MG
1 TABLET ORAL
Qty: 8 | Refills: 0
Start: 2023-06-05 | End: 2023-06-08

## 2023-06-05 RX ORDER — PANTOPRAZOLE SODIUM 20 MG/1
1 TABLET, DELAYED RELEASE ORAL
Qty: 30 | Refills: 0
Start: 2023-06-05 | End: 2023-07-04

## 2023-06-05 RX ORDER — APIXABAN 2.5 MG/1
1 TABLET, FILM COATED ORAL
Qty: 60 | Refills: 0
Start: 2023-06-05 | End: 2023-07-04

## 2023-06-05 RX ORDER — SODIUM ZIRCONIUM CYCLOSILICATE 10 G/10G
10 POWDER, FOR SUSPENSION ORAL EVERY 8 HOURS
Refills: 0 | Status: DISCONTINUED | OUTPATIENT
Start: 2023-06-05 | End: 2023-06-05

## 2023-06-05 RX ORDER — INSULIN HUMAN 100 [IU]/ML
5 INJECTION, SOLUTION SUBCUTANEOUS ONCE
Refills: 0 | Status: COMPLETED | OUTPATIENT
Start: 2023-06-05 | End: 2023-06-05

## 2023-06-05 RX ORDER — OLANZAPINE 15 MG/1
1 TABLET, FILM COATED ORAL
Refills: 0
Start: 2023-06-05

## 2023-06-05 RX ORDER — SODIUM ZIRCONIUM CYCLOSILICATE 10 G/10G
5 POWDER, FOR SUSPENSION ORAL
Qty: 210 | Refills: 0
Start: 2023-06-05 | End: 2023-06-18

## 2023-06-05 RX ORDER — DEXTROSE 50 % IN WATER 50 %
25 SYRINGE (ML) INTRAVENOUS ONCE
Refills: 0 | Status: COMPLETED | OUTPATIENT
Start: 2023-06-05 | End: 2023-06-05

## 2023-06-05 RX ADMIN — DORZOLAMIDE HYDROCHLORIDE TIMOLOL MALEATE 1 DROP(S): 20; 5 SOLUTION/ DROPS OPHTHALMIC at 06:10

## 2023-06-05 RX ADMIN — ISOSORBIDE MONONITRATE 60 MILLIGRAM(S): 60 TABLET, EXTENDED RELEASE ORAL at 13:19

## 2023-06-05 RX ADMIN — AMIODARONE HYDROCHLORIDE 200 MILLIGRAM(S): 400 TABLET ORAL at 06:13

## 2023-06-05 RX ADMIN — CEFTRIAXONE 100 MILLIGRAM(S): 500 INJECTION, POWDER, FOR SOLUTION INTRAMUSCULAR; INTRAVENOUS at 13:18

## 2023-06-05 RX ADMIN — APIXABAN 2.5 MILLIGRAM(S): 2.5 TABLET, FILM COATED ORAL at 06:13

## 2023-06-05 RX ADMIN — Medication 1 TABLET(S): at 13:45

## 2023-06-05 RX ADMIN — Medication 25 MILLIGRAM(S): at 13:23

## 2023-06-05 RX ADMIN — OLANZAPINE 2.5 MILLIGRAM(S): 15 TABLET, FILM COATED ORAL at 08:36

## 2023-06-05 RX ADMIN — Medication 25 MILLILITER(S): at 13:18

## 2023-06-05 RX ADMIN — Medication 81 MILLIGRAM(S): at 13:22

## 2023-06-05 RX ADMIN — SODIUM ZIRCONIUM CYCLOSILICATE 10 GRAM(S): 10 POWDER, FOR SUSPENSION ORAL at 13:44

## 2023-06-05 RX ADMIN — Medication 650 MILLIGRAM(S): at 06:14

## 2023-06-05 RX ADMIN — INSULIN HUMAN 5 UNIT(S): 100 INJECTION, SOLUTION SUBCUTANEOUS at 13:19

## 2023-06-05 RX ADMIN — BRIMONIDINE TARTRATE 1 DROP(S): 2 SOLUTION/ DROPS OPHTHALMIC at 06:11

## 2023-06-05 RX ADMIN — POLYETHYLENE GLYCOL 3350 17 GRAM(S): 17 POWDER, FOR SOLUTION ORAL at 06:16

## 2023-06-05 RX ADMIN — Medication 650 MILLIGRAM(S): at 13:20

## 2023-06-05 RX ADMIN — Medication 25 MILLIGRAM(S): at 06:10

## 2023-06-05 RX ADMIN — PANTOPRAZOLE SODIUM 40 MILLIGRAM(S): 20 TABLET, DELAYED RELEASE ORAL at 08:36

## 2023-06-05 NOTE — CHART NOTE - NSCHARTNOTEFT_GEN_A_CORE
Per chart pt CKD5 not on HD, T2DM, HTN, presents with AMS, found with progressing dementia with superimposed delirium, hyperkalemia. Palliative following for GOC; pt remains full code. Course complicated by agitation throughout course- behavioral health consult noted. Pt for possible hospice evaluation.    Factors impacting intake: [ ] none [ ] nausea  [ ] vomiting [ ] diarrhea [ ] constipation  [ ]chewing problems [ ] swallowing issues  [x] other: worsening mental status    Diet Prescription: Diet, Minced and Moist:   Consistent Carbohydrate {Evening Snack}  60 gm Protein (PRO60G)  No Concentrated Potassium  Low Sodium  Supplement Feeding Modality:  Oral  Suplena Cans or Servings Per Day:  1       Frequency:  Daily (05-30-23 @ 12:43) [Active]    Intake: pt with mostly poor intake during LOS    Current Weight: Weight (kg): 54.9 (05-29 @ 23:46)  % Weight Change: no recent weights to trend    No noted edema as per flow sheets.    Physical Appearance: Limited visual nutrition focused physical exam as pt asleep at time of visit: severe muscle wasting of temples and clavicles; severe fat loss in orbitals and triceps    Pertinent Medications: MEDICATIONS  (STANDING):  aMIOdarone    Tablet 200 milliGRAM(s) Oral daily  apixaban 2.5 milliGRAM(s) Oral every 12 hours  aspirin  chewable 81 milliGRAM(s) Oral daily  atorvastatin 40 milliGRAM(s) Oral at bedtime  azithromycin  IVPB 500 milliGRAM(s) IV Intermittent every 24 hours  brimonidine 0.2% Ophthalmic Solution 1 Drop(s) Both EYES two times a day  cefTRIAXone   IVPB      cefTRIAXone   IVPB 1000 milliGRAM(s) IV Intermittent every 24 hours  dorzolamide 2%/timolol 0.5% Ophthalmic Solution 1 Drop(s) Both EYES two times a day  hydrALAZINE 25 milliGRAM(s) Oral three times a day  isosorbide   mononitrate ER Tablet (IMDUR) 60 milliGRAM(s) Oral daily  multivitamin/minerals 1 Tablet(s) Oral daily  OLANZapine 2.5 milliGRAM(s) Oral <User Schedule>  OLANZapine 7.5 milliGRAM(s) Oral <User Schedule>  pantoprazole    Tablet 40 milliGRAM(s) Oral before breakfast  polyethylene glycol 3350 17 Gram(s) Oral two times a day  senna 2 Tablet(s) Oral at bedtime  sodium bicarbonate 650 milliGRAM(s) Oral three times a day    MEDICATIONS  (PRN):  LORazepam   Injectable 2 milliGRAM(s) IV Push every 6 hours PRN Agitation  OLANZapine 2.5 milliGRAM(s) Oral every 12 hours PRN agitation    Pertinent Labs: 06-04 Na140 mmol/L Glu 150 mg/dL<H> K+ 6.7 mmol/L<HH> Cr  7.94 mg/dL<H>  mg/dL<H> 06-03 Phos 5.1 mg/dL<H> 06-04 Alb 1.8 g/dL<L>    05-30-23 @ 07:02  A1C 7.1    Skin: sacrum- stage II as per pt profile    Estimated Needs:   [x] no change since previous assessment: 05/30  [ ] recalculated:     Previous Nutrition Diagnosis:   [ ] Malnutrition	Severe malnutrition in the context of chronic illness  Etiology	Inadequate protein-energy intake in setting of CKD, dementia  Signs/Symptoms	Physical findings of severe muscle and fat loss as noted above and 28% weight loss x 3 months  Goal/Expected Outcome	Pt to consume >75% meals/supplements during LOS (not met)      Nutrition Diagnosis is [x] ongoing  [ ] resolved [ ] not applicable     New Nutrition Diagnosis: [x] not applicable      Interventions:   Recommend  [x] Continue current diet as ordered  [ ] Change Diet To:  [ ] Nutrition Supplement  [ ] Nutrition Support  [x] Other: 1. Consider change Multivitamin with minerals to Nephro-michael due to high potassium as noted above  2. Continue to provide assistance and encouragement with PO intake     Monitoring and Evaluation:   [x] PO intake [ x ] Tolerance to diet prescription [ x ] weights [ x ] labs[ x ] follow up per protocol  [ ] other: Per chart pt CKD5 not on HD, T2DM, HTN, presents with AMS, found with progressing dementia with superimposed delirium, hyperkalemia. Palliative following for GOC; pt remains full code. Course complicated by agitation throughout course- behavioral health consult noted. Pt for possible hospice evaluation.    Factors impacting intake: [ ] none [ ] nausea  [ ] vomiting [ ] diarrhea [ ] constipation  [ ]chewing problems [ ] swallowing issues  [x] other: worsening mental status    Diet Prescription: Diet, Minced and Moist:   Consistent Carbohydrate {Evening Snack}  60 gm Protein (PRO60G)  No Concentrated Potassium  Low Sodium  Supplement Feeding Modality:  Oral  Suplena Cans or Servings Per Day:  1       Frequency:  Daily (05-30-23 @ 12:43) [Active]    Intake: pt with mostly poor intake during LOS    Current Weight: Weight (kg): 54.9 (05-29 @ 23:46)  % Weight Change: no recent weights to trend    No noted edema as per flow sheets.    Physical Appearance: Limited visual nutrition focused physical exam as pt asleep at time of visit: severe muscle wasting of temples and clavicles; severe fat loss in orbitals and triceps    Pertinent Medications: MEDICATIONS  (STANDING):  aMIOdarone    Tablet 200 milliGRAM(s) Oral daily  apixaban 2.5 milliGRAM(s) Oral every 12 hours  aspirin  chewable 81 milliGRAM(s) Oral daily  atorvastatin 40 milliGRAM(s) Oral at bedtime  azithromycin  IVPB 500 milliGRAM(s) IV Intermittent every 24 hours  brimonidine 0.2% Ophthalmic Solution 1 Drop(s) Both EYES two times a day  cefTRIAXone   IVPB      cefTRIAXone   IVPB 1000 milliGRAM(s) IV Intermittent every 24 hours  dorzolamide 2%/timolol 0.5% Ophthalmic Solution 1 Drop(s) Both EYES two times a day  hydrALAZINE 25 milliGRAM(s) Oral three times a day  isosorbide   mononitrate ER Tablet (IMDUR) 60 milliGRAM(s) Oral daily  multivitamin/minerals 1 Tablet(s) Oral daily  OLANZapine 2.5 milliGRAM(s) Oral <User Schedule>  OLANZapine 7.5 milliGRAM(s) Oral <User Schedule>  pantoprazole    Tablet 40 milliGRAM(s) Oral before breakfast  polyethylene glycol 3350 17 Gram(s) Oral two times a day  senna 2 Tablet(s) Oral at bedtime  sodium bicarbonate 650 milliGRAM(s) Oral three times a day    MEDICATIONS  (PRN):  LORazepam   Injectable 2 milliGRAM(s) IV Push every 6 hours PRN Agitation  OLANZapine 2.5 milliGRAM(s) Oral every 12 hours PRN agitation    Pertinent Labs: 06-04 Na140 mmol/L Glu 150 mg/dL<H> K+ 6.7 mmol/L<HH> Cr  7.94 mg/dL<H>  mg/dL<H> 06-03 Phos 5.1 mg/dL<H> 06-04 Alb 1.8 g/dL<L>    05-30-23 @ 07:02  A1C 7.1    Skin: sacrum- stage II as per pt profile    Estimated Needs:   [x] no change since previous assessment: 05/30  [ ] recalculated:     Previous Nutrition Diagnosis:   [ ] Malnutrition	Severe malnutrition in the context of chronic illness  Etiology	Inadequate protein-energy intake in setting of CKD, dementia  Signs/Symptoms	Physical findings of severe muscle and fat loss as noted above and 28% weight loss x 3 months  Goal/Expected Outcome	Pt to consume >75% meals/supplements during LOS (not met)      Nutrition Diagnosis is [x] ongoing  [ ] resolved [ ] not applicable     New Nutrition Diagnosis: [x] not applicable      Interventions:   Recommend  [x] Continue current diet as ordered  [ ] Change Diet To:  [ ] Nutrition Supplement  [ ] Nutrition Support  [x] Other: 1. Consider change Multivitamin with minerals to Nephro-michael due to CKD5  2. Continue to provide assistance and encouragement with PO intake     Monitoring and Evaluation:   [x] PO intake [ x ] Tolerance to diet prescription [ x ] weights [ x ] labs[ x ] follow up per protocol  [ ] other:

## 2023-06-05 NOTE — CHART NOTE - NSCHARTNOTEFT_GEN_A_CORE
This is in follow-up to my last Neurology Progress note, of 6/2/23.  Patient with worsening dementia found to have a high serum homocysteine level.  Methylmalonic acid level result was still pending as of 6/2/23; result now available: 332 (high end of normal range of 0-378).    Nutritional problems include protein-calorie malnutrition, vit D deficiency, likely folate and B12 deficiencies.      Please see the recommendations in [place] 6/2/23 Progress Note.  Also please start vit D3 supplementation      Roman Newton M.D.   - Department of Neurology  [place] [facility] [employer] [facility]

## 2023-06-05 NOTE — DISCHARGE NOTE NURSING/CASE MANAGEMENT/SOCIAL WORK - PATIENT PORTAL LINK FT
You can access the FollowMyHealth Patient Portal offered by [facility] by registering at the following website: [email or web address]. By joining [facility]’s FollowMyHealth portal, you will also be able to view your health information using other applications (apps) compatible with our system.

## 2023-06-12 DIAGNOSIS — Z51.5 ENCOUNTER FOR PALLIATIVE CARE: ICD-10-CM

## 2023-06-12 DIAGNOSIS — I12.0 HYPERTENSIVE CHRONIC KIDNEY DISEASE WITH STAGE 5 CHRONIC KIDNEY DISEASE OR END STAGE RENAL DISEASE: ICD-10-CM

## 2023-06-12 DIAGNOSIS — E43 UNSPECIFIED SEVERE PROTEIN-CALORIE MALNUTRITION: ICD-10-CM

## 2023-06-12 DIAGNOSIS — E11.22 TYPE 2 DIABETES MELLITUS WITH DIABETIC CHRONIC KIDNEY DISEASE: ICD-10-CM

## 2023-06-12 DIAGNOSIS — E55.9 VITAMIN D DEFICIENCY, UNSPECIFIED: ICD-10-CM

## 2023-06-12 DIAGNOSIS — J69.0 PNEUMONITIS DUE TO INHALATION OF FOOD AND VOMIT: ICD-10-CM

## 2023-06-12 DIAGNOSIS — J96.01 ACUTE RESPIRATORY FAILURE WITH HYPOXIA: ICD-10-CM

## 2023-06-12 DIAGNOSIS — R41.82 ALTERED MENTAL STATUS, UNSPECIFIED: ICD-10-CM

## 2023-06-12 DIAGNOSIS — R53.2 FUNCTIONAL QUADRIPLEGIA: ICD-10-CM

## 2023-06-12 DIAGNOSIS — G93.41 METABOLIC ENCEPHALOPATHY: ICD-10-CM

## 2023-06-12 DIAGNOSIS — F01.50 VASCULAR DEMENTIA, UNSPECIFIED SEVERITY, WITHOUT BEHAVIORAL DISTURBANCE, PSYCHOTIC DISTURBANCE, MOOD DISTURBANCE, AND ANXIETY: ICD-10-CM

## 2023-06-12 DIAGNOSIS — E53.8 DEFICIENCY OF OTHER SPECIFIED B GROUP VITAMINS: ICD-10-CM

## 2023-06-12 DIAGNOSIS — N18.5 CHRONIC KIDNEY DISEASE, STAGE 5: ICD-10-CM

## 2023-06-12 DIAGNOSIS — F05 DELIRIUM DUE TO KNOWN PHYSIOLOGICAL CONDITION: ICD-10-CM

## 2023-06-12 DIAGNOSIS — E87.5 HYPERKALEMIA: ICD-10-CM

## 2023-07-15 NOTE — PROVIDER CONTACT NOTE (CRITICAL VALUE NOTIFICATION) - SITUATION
critical K+ 6.3
None
Potassium 6.4
patient has critical potassium of 6.7. patient has CKD not on dialysis

## 2023-07-16 ENCOUNTER — INPATIENT (INPATIENT)
Facility: HOSPITAL | Age: 83
LOS: 2 days | Discharge: HOSPICE | End: 2023-07-19
Attending: STUDENT IN AN ORGANIZED HEALTH CARE EDUCATION/TRAINING PROGRAM | Admitting: STUDENT IN AN ORGANIZED HEALTH CARE EDUCATION/TRAINING PROGRAM
Payer: OTHER MISCELLANEOUS

## 2023-07-16 VITALS
DIASTOLIC BLOOD PRESSURE: 110 MMHG | SYSTOLIC BLOOD PRESSURE: 174 MMHG | OXYGEN SATURATION: 96 % | HEART RATE: 112 BPM | RESPIRATION RATE: 18 BRPM | WEIGHT: 149.91 LBS | TEMPERATURE: 99 F | HEIGHT: 68 IN

## 2023-07-16 PROCEDURE — 93010 ELECTROCARDIOGRAM REPORT: CPT

## 2023-07-16 RX ORDER — ACETAMINOPHEN 500 MG
1000 TABLET ORAL ONCE
Refills: 0 | Status: COMPLETED | OUTPATIENT
Start: 2023-07-16 | End: 2023-07-16

## 2023-07-16 RX ORDER — AZITHROMYCIN 500 MG/1
500 TABLET, FILM COATED ORAL ONCE
Refills: 0 | Status: COMPLETED | OUTPATIENT
Start: 2023-07-16 | End: 2023-07-16

## 2023-07-16 RX ORDER — SODIUM CHLORIDE 9 MG/ML
500 INJECTION INTRAMUSCULAR; INTRAVENOUS; SUBCUTANEOUS ONCE
Refills: 0 | Status: COMPLETED | OUTPATIENT
Start: 2023-07-16 | End: 2023-07-16

## 2023-07-16 RX ORDER — CEFTRIAXONE 500 MG/1
1000 INJECTION, POWDER, FOR SOLUTION INTRAMUSCULAR; INTRAVENOUS ONCE
Refills: 0 | Status: COMPLETED | OUTPATIENT
Start: 2023-07-16 | End: 2023-07-16

## 2023-07-16 RX ADMIN — Medication 400 MILLIGRAM(S): at 23:42

## 2023-07-16 RX ADMIN — SODIUM CHLORIDE 500 MILLILITER(S): 9 INJECTION INTRAMUSCULAR; INTRAVENOUS; SUBCUTANEOUS at 23:41

## 2023-07-16 NOTE — ED ADULT NURSE NOTE - CHIEF COMPLAINT QUOTE
pt bibems from home, per EMS family called b/c pt was "was not breathing" and initially called as a cardiac arrest.  when EMS got to scene, pt was at baseline, non-verbal, sating 100% on room air.    hx,, HTN, Dementia, kdiney cancer stage 4 on hospice care, afib. nkda.

## 2023-07-16 NOTE — ED ADULT TRIAGE NOTE - CHIEF COMPLAINT QUOTE
pt bibems from home, per EMS family called b/c pt was "was not breathing" and initially cardiac arrest.  when EMS got to scene, pt was at baseline, non-verbal, sating 100% on room air.    hx,, HTN, Dementia, kdiney cancer stage 5 on hospice care, afib. nkda. pt bibems from home, per EMS family called b/c pt was "was not breathing" and initially called as a cardiac arrest.  when EMS got to scene, pt was at baseline, non-verbal, sating 100% on room air.    hx,, HTN, Dementia, kdiney cancer stage 4 on hospice care, afib. nkda.

## 2023-07-16 NOTE — ED ADULT NURSE NOTE - ED STAT RN HANDOFF DETAILS
Report given to NINOSKA Minaya. RN made aware of patient PMH and HPI. RN made aware of medications given in ED, pertinent lab results, and that patient is full code at this time. RN made aware that patient is A&Ox2 at baseline. RN made aware pending urine collection and repeat lactate after IVF. VSS at this time. Fever coming down after tylenol. Report given to NINOSKA Minaya. RN made aware of patient PMH and HPI. RN made aware of medications given in ED, pertinent lab results, and that patient is full code at this time. RN made aware that patient is A&Ox2 at baseline. RN made aware pending urine collection and repeat lactate after IVF. VSS at this time. Fever coming down after tylenol. RN aware that patient is parainfluenza positive.

## 2023-07-16 NOTE — ED ADULT NURSE REASSESSMENT NOTE - NS ED NURSE REASSESS COMMENT FT1
Spoke to pt home hospice nurse, who confirmed pt is a full code. Updated her regarding current situation and plan of care. Will update as needed

## 2023-07-16 NOTE — ED ADULT NURSE NOTE - NSFALLHARMRISKINTERV_ED_ALL_ED
Assistance OOB with selected safe patient handling equipment if applicable/Assistance with ambulation/Communicate risk of Fall with Harm to all staff, patient, and family/Monitor gait and stability/Provide visual cue: red socks, yellow wristband, yellow gown, etc/Reinforce activity limits and safety measures with patient and family/Bed in lowest position, wheels locked, appropriate side rails in place/Call bell, personal items and telephone in reach/Instruct patient to call for assistance before getting out of bed/chair/stretcher/Non-slip footwear applied when patient is off stretcher/[place] to call system/Physically safe environment - no spills, clutter or unnecessary equipment/Purposeful Proactive Rounding/Room/bathroom lighting operational, light cord in reach

## 2023-07-17 DIAGNOSIS — I10 ESSENTIAL (PRIMARY) HYPERTENSION: ICD-10-CM

## 2023-07-17 DIAGNOSIS — J18.9 PNEUMONIA, UNSPECIFIED ORGANISM: ICD-10-CM

## 2023-07-17 DIAGNOSIS — E11.9 TYPE 2 DIABETES MELLITUS WITHOUT COMPLICATIONS: ICD-10-CM

## 2023-07-17 DIAGNOSIS — Z51.5 ENCOUNTER FOR PALLIATIVE CARE: ICD-10-CM

## 2023-07-17 DIAGNOSIS — R53.2 FUNCTIONAL QUADRIPLEGIA: ICD-10-CM

## 2023-07-17 DIAGNOSIS — F01.50 VASCULAR DEMENTIA WITHOUT BEHAVIORAL DISTURBANCE: ICD-10-CM

## 2023-07-17 DIAGNOSIS — I50.9 HEART FAILURE, UNSPECIFIED: ICD-10-CM

## 2023-07-17 DIAGNOSIS — N18.6 END STAGE RENAL DISEASE: ICD-10-CM

## 2023-07-17 DIAGNOSIS — J96.01 ACUTE RESPIRATORY FAILURE WITH HYPOXIA: ICD-10-CM

## 2023-07-17 LAB
ALBUMIN SERPL ELPH-MCNC: 2.2 G/DL — LOW (ref 3.3–5)
ALP SERPL-CCNC: 87 U/L — SIGNIFICANT CHANGE UP (ref 40–120)
ALT FLD-CCNC: 18 U/L — SIGNIFICANT CHANGE UP (ref 12–78)
ANION GAP SERPL CALC-SCNC: 9 MMOL/L — SIGNIFICANT CHANGE UP (ref 5–17)
APTT BLD: 27.3 SEC — LOW (ref 27.5–35.5)
AST SERPL-CCNC: 19 U/L — SIGNIFICANT CHANGE UP (ref 15–37)
BASE EXCESS BLDV CALC-SCNC: -4.4 MMOL/L — LOW (ref -2–3)
BASOPHILS # BLD AUTO: 0 K/UL — SIGNIFICANT CHANGE UP (ref 0–0.2)
BASOPHILS NFR BLD AUTO: 0 % — SIGNIFICANT CHANGE UP (ref 0–2)
BILIRUB SERPL-MCNC: 0.6 MG/DL — SIGNIFICANT CHANGE UP (ref 0.2–1.2)
BLOOD GAS COMMENTS, VENOUS: SIGNIFICANT CHANGE UP
BUN SERPL-MCNC: 48 MG/DL — HIGH (ref 7–23)
BURR CELLS BLD QL SMEAR: PRESENT — SIGNIFICANT CHANGE UP
CALCIUM SERPL-MCNC: 8.6 MG/DL — SIGNIFICANT CHANGE UP (ref 8.5–10.1)
CHLORIDE BLDV-SCNC: 105 MMOL/L — SIGNIFICANT CHANGE UP (ref 98–107)
CHLORIDE SERPL-SCNC: 108 MMOL/L — SIGNIFICANT CHANGE UP (ref 96–108)
CO2 BLDV-SCNC: 24 MMOL/L — SIGNIFICANT CHANGE UP (ref 22–26)
CO2 SERPL-SCNC: 21 MMOL/L — LOW (ref 22–31)
CREAT SERPL-MCNC: 4.9 MG/DL — HIGH (ref 0.5–1.3)
EGFR: 11 ML/MIN/1.73M2 — LOW
ELLIPTOCYTES BLD QL SMEAR: SLIGHT — SIGNIFICANT CHANGE UP
EOSINOPHIL # BLD AUTO: 0 K/UL — SIGNIFICANT CHANGE UP (ref 0–0.5)
EOSINOPHIL NFR BLD AUTO: 0 % — SIGNIFICANT CHANGE UP (ref 0–6)
GAS PNL BLDV: 133 MMOL/L — LOW (ref 136–145)
GAS PNL BLDV: SIGNIFICANT CHANGE UP
GAS PNL BLDV: SIGNIFICANT CHANGE UP
GLUCOSE BLDC GLUCOMTR-MCNC: 192 MG/DL — HIGH (ref 70–99)
GLUCOSE BLDC GLUCOMTR-MCNC: 200 MG/DL — HIGH (ref 70–99)
GLUCOSE BLDV-MCNC: 226 MG/DL — HIGH (ref 65–95)
GLUCOSE SERPL-MCNC: 214 MG/DL — HIGH (ref 70–99)
HCO3 BLDV-SCNC: 23 MMOL/L — SIGNIFICANT CHANGE UP (ref 22–28)
HCT VFR BLD CALC: 35.3 % — LOW (ref 39–50)
HCT VFR BLDA CALC: 39 % — SIGNIFICANT CHANGE UP (ref 37–47)
HGB BLD CALC-MCNC: 13 G/DL — SIGNIFICANT CHANGE UP (ref 12.6–17.4)
HGB BLD-MCNC: 10.3 G/DL — LOW (ref 13–17)
HOROWITZ INDEX BLDV+IHG-RTO: 100 — SIGNIFICANT CHANGE UP
HPIV3 RNA SPEC QL NAA+PROBE: DETECTED
INR BLD: 1.12 RATIO — SIGNIFICANT CHANGE UP (ref 0.88–1.16)
LACTATE BLDV-MCNC: 4.4 MMOL/L — CRITICAL HIGH (ref 0.56–1.39)
LACTATE SERPL-SCNC: 1.8 MMOL/L — SIGNIFICANT CHANGE UP (ref 0.7–2)
LACTATE SERPL-SCNC: 5.4 MMOL/L — CRITICAL HIGH (ref 0.7–2)
LYMPHOCYTES # BLD AUTO: 0.38 K/UL — LOW (ref 1–3.3)
LYMPHOCYTES # BLD AUTO: 4 % — LOW (ref 13–44)
MACROCYTES BLD QL: SLIGHT — SIGNIFICANT CHANGE UP
MAGNESIUM SERPL-MCNC: 2 MG/DL — SIGNIFICANT CHANGE UP (ref 1.6–2.6)
MANUAL SMEAR VERIFICATION: SIGNIFICANT CHANGE UP
MCHC RBC-ENTMCNC: 26.8 PG — LOW (ref 27–34)
MCHC RBC-ENTMCNC: 29.2 G/DL — LOW (ref 32–36)
MCV RBC AUTO: 91.7 FL — SIGNIFICANT CHANGE UP (ref 80–100)
MONOCYTES # BLD AUTO: 0.56 K/UL — SIGNIFICANT CHANGE UP (ref 0–0.9)
MONOCYTES NFR BLD AUTO: 6 % — SIGNIFICANT CHANGE UP (ref 2–14)
NEUTROPHILS # BLD AUTO: 8.44 K/UL — HIGH (ref 1.8–7.4)
NEUTROPHILS NFR BLD AUTO: 90 % — HIGH (ref 43–77)
NRBC # BLD: 0 /100 — SIGNIFICANT CHANGE UP (ref 0–0)
NRBC # BLD: SIGNIFICANT CHANGE UP /100 WBCS (ref 0–0)
OVALOCYTES BLD QL SMEAR: SLIGHT — SIGNIFICANT CHANGE UP
PCO2 BLDV: 48 MMHG — SIGNIFICANT CHANGE UP (ref 42–55)
PH BLDV: 7.28 — LOW (ref 7.32–7.43)
PHOSPHATE SERPL-MCNC: 3.6 MG/DL — SIGNIFICANT CHANGE UP (ref 2.5–4.5)
PLAT MORPH BLD: NORMAL — SIGNIFICANT CHANGE UP
PLATELET # BLD AUTO: 198 K/UL — SIGNIFICANT CHANGE UP (ref 150–400)
PO2 BLDV: 39 MMHG — SIGNIFICANT CHANGE UP (ref 25–45)
POIKILOCYTOSIS BLD QL AUTO: SLIGHT — SIGNIFICANT CHANGE UP
POTASSIUM BLDV-SCNC: 4.7 MMOL/L — SIGNIFICANT CHANGE UP (ref 3.5–5.1)
POTASSIUM SERPL-MCNC: 4.8 MMOL/L — SIGNIFICANT CHANGE UP (ref 3.5–5.3)
POTASSIUM SERPL-SCNC: 4.8 MMOL/L — SIGNIFICANT CHANGE UP (ref 3.5–5.3)
PROT SERPL-MCNC: 6.7 GM/DL — SIGNIFICANT CHANGE UP (ref 6–8.3)
PROTHROM AB SERPL-ACNC: 13.3 SEC — SIGNIFICANT CHANGE UP (ref 10.5–13.4)
RAPID RVP RESULT: DETECTED
RBC # BLD: 3.85 M/UL — LOW (ref 4.2–5.8)
RBC # FLD: 15.8 % — HIGH (ref 10.3–14.5)
RBC BLD AUTO: SIGNIFICANT CHANGE UP
SAO2 % BLDV: 63.2 % — LOW (ref 94–98)
SARS-COV-2 RNA SPEC QL NAA+PROBE: SIGNIFICANT CHANGE UP
SCHISTOCYTES BLD QL AUTO: SLIGHT — SIGNIFICANT CHANGE UP
SODIUM SERPL-SCNC: 138 MMOL/L — SIGNIFICANT CHANGE UP (ref 135–145)
TROPONIN I, HIGH SENSITIVITY RESULT: 109.1 NG/L — HIGH
TROPONIN I, HIGH SENSITIVITY RESULT: 153.2 NG/L — HIGH
TROPONIN I, HIGH SENSITIVITY RESULT: 185.4 NG/L — HIGH
WBC # BLD: 9.38 K/UL — SIGNIFICANT CHANGE UP (ref 3.8–10.5)
WBC # FLD AUTO: 9.38 K/UL — SIGNIFICANT CHANGE UP (ref 3.8–10.5)

## 2023-07-17 PROCEDURE — 99223 1ST HOSP IP/OBS HIGH 75: CPT

## 2023-07-17 PROCEDURE — 93010 ELECTROCARDIOGRAM REPORT: CPT

## 2023-07-17 PROCEDURE — 71045 X-RAY EXAM CHEST 1 VIEW: CPT | Mod: 26

## 2023-07-17 PROCEDURE — 99497 ADVNCD CARE PLAN 30 MIN: CPT

## 2023-07-17 RX ORDER — ASPIRIN/CALCIUM CARB/MAGNESIUM 324 MG
324 TABLET ORAL ONCE
Refills: 0 | Status: COMPLETED | OUTPATIENT
Start: 2023-07-17 | End: 2023-07-17

## 2023-07-17 RX ORDER — ATORVASTATIN CALCIUM 80 MG/1
40 TABLET, FILM COATED ORAL AT BEDTIME
Refills: 0 | Status: DISCONTINUED | OUTPATIENT
Start: 2023-07-17 | End: 2023-07-19

## 2023-07-17 RX ORDER — PANTOPRAZOLE SODIUM 20 MG/1
40 TABLET, DELAYED RELEASE ORAL
Refills: 0 | Status: DISCONTINUED | OUTPATIENT
Start: 2023-07-17 | End: 2023-07-19

## 2023-07-17 RX ORDER — INSULIN LISPRO 100/ML
VIAL (ML) SUBCUTANEOUS
Refills: 0 | Status: DISCONTINUED | OUTPATIENT
Start: 2023-07-17 | End: 2023-07-19

## 2023-07-17 RX ORDER — SODIUM CHLORIDE 9 MG/ML
500 INJECTION INTRAMUSCULAR; INTRAVENOUS; SUBCUTANEOUS ONCE
Refills: 0 | Status: COMPLETED | OUTPATIENT
Start: 2023-07-17 | End: 2023-07-17

## 2023-07-17 RX ORDER — DEXTROSE 50 % IN WATER 50 %
25 SYRINGE (ML) INTRAVENOUS ONCE
Refills: 0 | Status: DISCONTINUED | OUTPATIENT
Start: 2023-07-17 | End: 2023-07-19

## 2023-07-17 RX ORDER — AZITHROMYCIN 500 MG/1
500 TABLET, FILM COATED ORAL EVERY 24 HOURS
Refills: 0 | Status: DISCONTINUED | OUTPATIENT
Start: 2023-07-18 | End: 2023-07-19

## 2023-07-17 RX ORDER — IPRATROPIUM/ALBUTEROL SULFATE 18-103MCG
3 AEROSOL WITH ADAPTER (GRAM) INHALATION ONCE
Refills: 0 | Status: COMPLETED | OUTPATIENT
Start: 2023-07-18 | End: 2023-07-18

## 2023-07-17 RX ORDER — APIXABAN 2.5 MG/1
2.5 TABLET, FILM COATED ORAL
Refills: 0 | Status: DISCONTINUED | OUTPATIENT
Start: 2023-07-17 | End: 2023-07-19

## 2023-07-17 RX ORDER — MULTIVIT-MIN/FERROUS GLUCONATE 9 MG/15 ML
1 LIQUID (ML) ORAL DAILY
Refills: 0 | Status: DISCONTINUED | OUTPATIENT
Start: 2023-07-17 | End: 2023-07-19

## 2023-07-17 RX ORDER — LANOLIN ALCOHOL/MO/W.PET/CERES
3 CREAM (GRAM) TOPICAL AT BEDTIME
Refills: 0 | Status: DISCONTINUED | OUTPATIENT
Start: 2023-07-17 | End: 2023-07-19

## 2023-07-17 RX ORDER — ASPIRIN/CALCIUM CARB/MAGNESIUM 324 MG
81 TABLET ORAL DAILY
Refills: 0 | Status: DISCONTINUED | OUTPATIENT
Start: 2023-07-17 | End: 2023-07-17

## 2023-07-17 RX ORDER — ACETAMINOPHEN 500 MG
650 TABLET ORAL EVERY 6 HOURS
Refills: 0 | Status: DISCONTINUED | OUTPATIENT
Start: 2023-07-17 | End: 2023-07-19

## 2023-07-17 RX ORDER — OLANZAPINE 15 MG/1
5 TABLET, FILM COATED ORAL ONCE
Refills: 0 | Status: COMPLETED | OUTPATIENT
Start: 2023-07-17 | End: 2023-07-17

## 2023-07-17 RX ORDER — OLANZAPINE 15 MG/1
2.5 TABLET, FILM COATED ORAL
Refills: 0 | Status: DISCONTINUED | OUTPATIENT
Start: 2023-07-17 | End: 2023-07-19

## 2023-07-17 RX ORDER — OLANZAPINE 15 MG/1
2.5 TABLET, FILM COATED ORAL ONCE
Refills: 0 | Status: COMPLETED | OUTPATIENT
Start: 2023-07-17 | End: 2023-07-17

## 2023-07-17 RX ORDER — ASPIRIN/CALCIUM CARB/MAGNESIUM 324 MG
81 TABLET ORAL DAILY
Refills: 0 | Status: DISCONTINUED | OUTPATIENT
Start: 2023-07-18 | End: 2023-07-19

## 2023-07-17 RX ORDER — SODIUM CHLORIDE 9 MG/ML
1000 INJECTION INTRAMUSCULAR; INTRAVENOUS; SUBCUTANEOUS
Refills: 0 | Status: DISCONTINUED | OUTPATIENT
Start: 2023-07-17 | End: 2023-07-17

## 2023-07-17 RX ORDER — SODIUM CHLORIDE 9 MG/ML
1000 INJECTION, SOLUTION INTRAVENOUS
Refills: 0 | Status: DISCONTINUED | OUTPATIENT
Start: 2023-07-17 | End: 2023-07-19

## 2023-07-17 RX ORDER — DEXTROSE 50 % IN WATER 50 %
15 SYRINGE (ML) INTRAVENOUS ONCE
Refills: 0 | Status: DISCONTINUED | OUTPATIENT
Start: 2023-07-17 | End: 2023-07-19

## 2023-07-17 RX ORDER — GLUCAGON INJECTION, SOLUTION 0.5 MG/.1ML
1 INJECTION, SOLUTION SUBCUTANEOUS ONCE
Refills: 0 | Status: DISCONTINUED | OUTPATIENT
Start: 2023-07-17 | End: 2023-07-19

## 2023-07-17 RX ORDER — ISOSORBIDE MONONITRATE 60 MG/1
60 TABLET, EXTENDED RELEASE ORAL DAILY
Refills: 0 | Status: DISCONTINUED | OUTPATIENT
Start: 2023-07-17 | End: 2023-07-19

## 2023-07-17 RX ORDER — HYDRALAZINE HCL 50 MG
25 TABLET ORAL THREE TIMES A DAY
Refills: 0 | Status: DISCONTINUED | OUTPATIENT
Start: 2023-07-17 | End: 2023-07-19

## 2023-07-17 RX ADMIN — SODIUM CHLORIDE 500 MILLILITER(S): 9 INJECTION INTRAMUSCULAR; INTRAVENOUS; SUBCUTANEOUS at 00:47

## 2023-07-17 RX ADMIN — ATORVASTATIN CALCIUM 40 MILLIGRAM(S): 80 TABLET, FILM COATED ORAL at 21:10

## 2023-07-17 RX ADMIN — OLANZAPINE 2.5 MILLIGRAM(S): 15 TABLET, FILM COATED ORAL at 10:05

## 2023-07-17 RX ADMIN — Medication 3 MILLIGRAM(S): at 22:35

## 2023-07-17 RX ADMIN — OLANZAPINE 5 MILLIGRAM(S): 15 TABLET, FILM COATED ORAL at 16:52

## 2023-07-17 RX ADMIN — Medication 25 MILLIGRAM(S): at 21:10

## 2023-07-17 RX ADMIN — OLANZAPINE 5 MILLIGRAM(S): 15 TABLET, FILM COATED ORAL at 22:35

## 2023-07-17 RX ADMIN — AZITHROMYCIN 255 MILLIGRAM(S): 500 TABLET, FILM COATED ORAL at 00:50

## 2023-07-17 RX ADMIN — CEFTRIAXONE 100 MILLIGRAM(S): 500 INJECTION, POWDER, FOR SOLUTION INTRAMUSCULAR; INTRAVENOUS at 00:05

## 2023-07-17 RX ADMIN — PANTOPRAZOLE SODIUM 40 MILLIGRAM(S): 20 TABLET, DELAYED RELEASE ORAL at 07:41

## 2023-07-17 RX ADMIN — Medication 25 MILLIGRAM(S): at 07:40

## 2023-07-17 RX ADMIN — APIXABAN 2.5 MILLIGRAM(S): 2.5 TABLET, FILM COATED ORAL at 07:40

## 2023-07-17 NOTE — H&P ADULT - PROBLEM SELECTOR PLAN 3
Encounter Date: 10/29/2018    SCRIBE #1 NOTE: I, Son Karen, am scribing for, and in the presence of,  Dr. Moreno . I have scribed the following portions of the note - Other sections scribed: HPI ROS PE .       History     Chief Complaint   Patient presents with    Arm Pain     L arm pain and numbness since last wed, sent from  clinic     Time patient was seen by the provider: 12:34 PM      The patient is a 44 y.o. female with no pmhx presents to the ED with a complaint of left shoulder and left arm pain that started last week. She states that her pain was intermittent at first, but since yesterday her pain has been constant. Denies any falls or trauma to the area. She also reports of feeling dizziness, chest tightness, and shortness of breath that began yesterday. Today, she notices that her left leg is more swollen than her right. Reports of having numbness and heaviness sensation to her left leg. Denies history of blood clots. Denies similar symptoms in the past. Pt took ibuprofen last night, but did not resolve her symptoms. Denies dysaphia, fever, ear pain, nausea, vomiting, diarrhea.       The history is provided by the patient and medical records.     Review of patient's allergies indicates:   Allergen Reactions    Bactrim [sulfamethoxazole-trimethoprim] Hives and Itching     History reviewed. No pertinent past medical history.  Past Surgical History:   Procedure Laterality Date     SECTION, CLASSIC      HERNIA REPAIR      SPINE SURGERY       History reviewed. No pertinent family history.  Social History     Tobacco Use    Smoking status: Never Smoker    Smokeless tobacco: Never Used   Substance Use Topics    Alcohol use: Yes     Comment: [email or web address]    Drug use: No     Review of Systems   Constitutional: Negative for chills and fever.   HENT: Negative for ear pain and trouble swallowing.    Eyes: Negative for visual disturbance.   Respiratory: Positive for chest tightness and shortness of  breath.    Cardiovascular: Positive for leg swelling (Left).   Gastrointestinal: Negative for diarrhea, nausea and vomiting.   Genitourinary: Negative for dysuria.   Musculoskeletal:        + left shoulder pain  + left arm pain    Skin: Negative for color change.   Neurological: Positive for dizziness and numbness.       Physical Exam     Initial Vitals [10/29/18 1202]   BP Pulse Resp Temp SpO2   138/80 61 18 98.5 °F (36.9 °C) 98 %      MAP       --         Physical Exam    Vitals reviewed.  Constitutional:   43 y/o  woman, moderate discomfort noted.    HENT:   Head: Normocephalic and atraumatic.   No intraoral lesions noted.    Eyes: EOM are normal. Pupils are equal, round, and reactive to light.   Neck:   Trachea midline  There is significant spasms and tenderness to the left trapezius as well as left cervical paraspinal without midline tenderness.    Cardiovascular: Normal rate, regular rhythm, normal heart sounds and intact distal pulses.   Pulmonary/Chest: She exhibits tenderness.   Clear breath sounds bilaterally without respiratory distress.  There is mild tenderness of the anterior chest wall 1 cm lateral of the sternum    Abdominal: Soft. She exhibits no distension. There is no tenderness.   Obese     Musculoskeletal: She exhibits edema.   Moving all 4 extremities.   1+ pitting edema bilaterally noted.    Neurological: She is alert and oriented to person, place, and time.   Psychiatric: She has a normal mood and affect. Thought content normal.         ED Course   Procedures  Labs Reviewed   CBC W/ AUTO DIFFERENTIAL - Abnormal; Notable for the following components:       Result Value    MCHC 31.3 (*)     RDW 14.6 (*)     All other components within normal limits   COMPREHENSIVE METABOLIC PANEL - Abnormal; Notable for the following components:    Anion Gap 7 (*)     All other components within normal limits   PROTIME-INR   TROPONIN I   D DIMER, QUANTITATIVE   POCT URINE PREGNANCY     EKG  Readings: (Independently Interpreted)   Rhythm: Sinus Bradycardia. Heart Rate: 58 BPM . Axis: Normal.   T wave inversion at lead III        Imaging Results          US Lower Extremity Veins Bilateral (Final result)  Result time 10/29/18 16:17:36    Final result by Rupert Evans MD (10/29/18 16:17:36)                 Impression:      No evidence of deep venous thrombosis in either lower extremity.    Electronically signed by resident: Femi Blanc  Date:    10/29/2018  Time:    14:31    Electronically signed by: Rupert Evans MD  Date:    10/29/2018  Time:    16:17             Narrative:    EXAMINATION:  US LOWER EXTREMITY VEINS BILATERAL    CLINICAL HISTORY:  Other specified soft tissue disorders    TECHNIQUE:  Duplex and color flow Doppler and dynamic compression was performed of the bilateral lower extremity veins was performed.    COMPARISON:  No relevant prior imaging for comparison.    FINDINGS:  Right thigh veins: The common femoral, femoral, popliteal, upper greater saphenous, and deep femoral veins are patent and free of thrombus. The veins are normally compressible and have normal phasic flow and augmentation response.    Right calf veins: The visualized calf veins are patent.    Left thigh veins: The common femoral, femoral, popliteal, upper greater saphenous, and deep femoral veins are patent and free of thrombus. The veins are normally compressible and have normal phasic flow and augmentation response.    Left calf veins: The visualized calf veins are patent.    Miscellaneous: None                                 Medical Decision Making:   History:   Old Medical Records: I decided to obtain old medical records.  Initial Assessment:   Differential diagnosis includes: cervical radiculopathy, trapezius spasm, lethal arrhythmia, DVT   Independently Interpreted Test(s):   I have ordered and independently interpreted EKG Reading(s) - see prior notes  Clinical Tests:   Lab Tests: Ordered and  Reviewed  Radiological Study: Ordered and Reviewed  Medical Tests: Ordered and Reviewed            Scribe Attestation:   Scribe #1: I performed the above scribed service and the documentation accurately describes the services I performed. I attest to the accuracy of the note.    Attending Attestation:             Attending ED Notes:   Emergency department evaluation today does not reveal any evidence of significant hematologic or metabolic derangements.  Additionally, a screening D-dimer is within normal limits, EKG does not reveal any evidence of significant ischemic change, and DVT study of bilateral lower extremities is without evidence of embolic disease.  The patient describes significant improvement of her presenting symptoms with emergency department therapy.  She will be discharged home in improved condition with prescriptions for Valium, meloxicam, and Robaxin to be taken as directed.  I have discussed with the patient indications to return to the emergency department such as onset of fever, worsening pain, productive cough, or other urgent concerns.  I have recommended that she follow up with her managing primary care physician to further monitor her response to outpatient therapy.             Clinical Impression:   The primary encounter diagnosis was Trapezius muscle spasm. Diagnoses of Chest wall pain, Left leg swelling, and Cervical radiculopathy were also pertinent to this visit.      Disposition:   Disposition: Discharged  Condition: Stable                        Carlton Moreno MD  11/03/18 2835     ISS with hypoglycemic protocol  Monitor

## 2023-07-17 NOTE — H&P ADULT - PROBLEM SELECTOR PLAN 1
Acute respiratory distress most likely secondary to Para-influenza   - Monitor respiratory status   - O2 2L NC as needed   - Duoneb PRN  - Tylenol for fever

## 2023-07-17 NOTE — CONSULT NOTE ADULT - PROBLEM SELECTOR RECOMMENDATION 9
positive for parainfluenza, has some audible secretions on exam, bronchodilators PRN HOBE and oral hygiene  supplemental oxygen as needed, denies shortness of breath positive for parainfluenza, on room air, has some audible secretions on exam, bronchodilators PRN HOBE and oral hygiene  supplemental oxygen as needed, denies shortness of breath

## 2023-07-17 NOTE — CONSULT NOTE ADULT - SUBJECTIVE AND OBJECTIVE BOX
HPI:  83 year old male who is currently on home Hospice  with a PMH of 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  As per the son, this morning patient started to have a productive cough, this afternoon he was found to have difficulty breathing which prompted the EMS call. Denies any fever/chills. Upon ED arrival patient was placed on NRB then switched to 2L NC.  Upon evaluation, patient was on RA saturating 97%, no acute distress. Patient was found to be positive for Para-Influenza 3. Afebrile, no Leukocytosis.   Patient is currently full code, son is considering DNR.    (17 Jul 2023 03:22)    PERTINENT PM/SXH:   No pertinent past medical history    DM (diabetes mellitus)    CHF (congestive heart failure)        FAMILY HISTORY:    ITEMS NOT CHECKED ARE NOT PRESENT    SOCIAL HISTORY:   Significant other/partner:  [ ]  Children: yes  [ ]  Alevism/Spirituality: Church  Substance hx:  [ ]   Tobacco hx:  [ ]   Alcohol hx: [ ]   Home Opioid hx:  [ ] I-Stop Reference No: Reference #: [id number]    Practitioner Count: 2  Pharmacy Count: 1  Current Opioid Prescriptions: 0  Current Benzodiazepine Prescriptions: 0  Current Stimulant Prescriptions: 0      Patient Demographic Information (PDI)       PDI	First Name	Last Name	Birth Date	Gender	Street Address	[facility]  A	[facility]	1940	Male	[address] [facility]	[id number]  B	[facility]	1940	Male	[address] [facility]	[id number]  C	[facility]	1940	Male	[address] [facility]	[id number]    Prescription Information      PDI Filter:    PDI	My Rx	Current Rx	Drug Type	Rx Written	Rx Dispensed	Drug	Quantity	Days Supply	Prescriber Name	Prescriber PRESTON #	Payment Method	Dispenser  A	N	N	B	06/29/2023	06/30/2023	lorazepam powder *	0	4	Nelida Manuel (NP)	[id number]	Insurance	[facility]  A	N	N	O	06/07/2023	06/08/2023	morphine sulfate powder *	0	3	Vinay Navas)	[id number]	Cash	[facility]  A	N	N	B	06/07/2023	06/08/2023	lorazepam powder *	0	3	Vinay Navas)	[id number]	Cash	[facility]  B	N	N	O	03/24/2023	03/24/2023	acetaminophen-cod #3 tablet	21	7	Pedrito Perkins MD	[id number]	Medicare	[facility] [id number]  C	N	N	O	03/01/2023	03/02/2023	tramadol hcl 50 mg tablet	20	10	Thomas Perez J	[id number]	Cash	[facility]    Living Situation: [x ]Home  [ ]Long term care  [ ]Rehab [ ]Other    ADVANCE DIRECTIVES:    DNR  MOLST  [ ]  Living Will  [ ]   DECISION MAKER(s):  [ ] Health Care Proxy(s)  [x ] Surrogate(s)  [ ] Guardian           Name(s): Phone Number(s): Oscar (son) [phone]    BASELINE (I)ADL(s) (prior to admission):  [place]: [ ]Total  [ ] Moderate [x ]Dependent    Allergies    No Known Allergies    Intolerances    MEDICATIONS  (STANDING):  apixaban 2.5 milliGRAM(s) Oral two times a day  aspirin  chewable 81 milliGRAM(s) Oral daily  atorvastatin 40 milliGRAM(s) Oral at bedtime  dextrose 5%. 1000 milliLiter(s) (50 mL/Hr) IV Continuous <Continuous>  dextrose 50% Injectable 25 Gram(s) IV Push once  glucagon  Injectable 1 milliGRAM(s) IntraMuscular once  hydrALAZINE 25 milliGRAM(s) Oral three times a day  insulin lispro (ADMELOG) corrective regimen sliding scale   SubCutaneous three times a day before meals  isosorbide   mononitrate ER Tablet (IMDUR) 60 milliGRAM(s) Oral daily  multivitamin/minerals 1 Tablet(s) Oral daily  pantoprazole    Tablet 40 milliGRAM(s) Oral before breakfast    MEDICATIONS  (PRN):  acetaminophen     Tablet .. 650 milliGRAM(s) Oral every 6 hours PRN Temp greater or equal to 38C (100.4F), Mild Pain (1 - 3)  dextrose Oral Gel 15 Gram(s) Oral once PRN Blood Glucose LESS THAN 70 milliGRAM(s)/deciliter  OLANZapine 2.5 milliGRAM(s) Oral two times a day PRN for agitation    PRESENT SYMPTOMS: [ x]Unable to obtain due to poor mentation   Source if other than patient:  [ ]Family   [ ]Team     Pain: [ ] yes [x ] no  QOL impact -   Location -                    Aggravating factors -  Quality -  Radiation -  Timing-  Severity (0-10 scale):  Minimal acceptable level (0-10 scale):     PAIN AD Score:     [email or web address] (press ctrl +  left click to view)    Dyspnea:                           [ ]Mild [ ]Moderate [ ]Severe  Anxiety:                             [ ]Mild [ ]Moderate [ ]Severe  Fatigue:                             [ ]Mild [ ]Moderate [ ]Severe  Nausea:                             [ ]Mild [ ]Moderate [ ]Severe  Loss of appetite:              [ ]Mild [ ]Moderate [ ]Severe  Constipation:                    [ ]Mild [ ]Moderate [ ]Severe    Other Symptoms:1  [ ]All other review of systems negative     Karnofsky Performance Score/Palliative Performance Status Version 2:         30%    http://npcrc.org/files/news/palliative_performance_scale_ppsv2.pdf  PHYSICAL EXAM:  Vital Signs Last 24 Hrs  T(C): 36.4 (17 Jul 2023 13:04), Max: 38.3 (16 Jul 2023 23:03)  T(F): 97.6 (17 Jul 2023 13:04), Max: 101 (16 Jul 2023 23:03)  HR: 83 (17 Jul 2023 13:04) (83 - 112)  BP: 166/81 (17 Jul 2023 13:04) (131/76 - 174/110)  BP(mean): --  RR: 19 (17 Jul 2023 13:04) (15 - 20)  SpO2: 97% (17 Jul 2023 13:04) (96% - 100%)    Parameters below as of 17 Jul 2023 13:04  Patient On (Oxygen Delivery Method): room air     I&O's Summary  GENERAL:  [ ]Alert  [ ]Oriented x   [x ]Lethargic  [ ]Cachexia  [ ]Unarousable  [x ]Verbal  [ ]Non-Verbal  Behavioral:   [ ] Anxiety  [ ] Delirium [ ] Agitation [ ] Other  HEENT:  [ ]Normal   [x ]Dry mouth   [ ]ET Tube/Trach  [ ]Oral lesions  PULMONARY:   [ ]Clear [ ]Tachypnea  [ x]Audible excessive secretions   [ ]Rhonchi        [ ]Right [ ]Left [ ]Bilateral  [ ]Crackles        [ ]Right [ ]Left [ ]Bilateral  [ ]Wheezing     [ ]Right [ ]Left [ ]Bilateral  CARDIOVASCULAR:    [x ]Regular [ ]Irregular [ ]Tachy  [ ]Edd [ ]Murmur [ ]Other  GASTROINTESTINAL:  [x ]Soft  [ ]Distended   [ ]+BS  [x ]Non tender [ ]Tender  [ ]PEG [ ]OGT/ NGT  Last BM: prior to admission GENITOURINARY:  [ ]Normal [x ] Incontinent   [ ]Oliguria/Anuria   [ ]Francisco  MUSCULOSKELETAL:   [ ]Normal   [ ]Weakness  [x ]Bed/Wheelchair bound [ ]Edema  NEUROLOGIC:   [ ]No focal deficits  [x ] Cognitive impairment  [ ] Dysphagia [ ]Dysarthria [ ] Paresis [ ]Other   SKIN:   [ ]Normal   [ x]Pressure ulcer(s) sacrum stage III  [ ]Rash    CRITICAL CARE:  [ ] Shock Present  [ ]Septic [ ]Cardiogenic [ ]Neurologic [ ]Hypovolemic  [ ]  Vasopressors [ ]  Inotropes   [ ] Respiratory failure present [ ] mechanical ventilation [ ] non-invasive ventilatory support [ ] High flow  [ ] Acute  [ ] Chronic [ ] Hypoxic  [ ] Hypercarbic [ ] Other  [ ] Other organ failure     LABS:                        10.3   9.38  )-----------( 198      ( 17 Jul 2023 00:20 )             35.3   07-17    138  |  108  |  48<H>  ----------------------------<  214<H>  4.8   |  21<L>  |  4.90<H>    Ca    8.6      17 Jul 2023 00:20  Phos  3.6     07-17  Mg     2.0     07-17    TPro  6.7  /  Alb  2.2<L>  /  TBili  0.6  /  DBili  x   /  AST  19  /  ALT  18  /  AlkPhos  87  07-17  PT/INR - ( 17 Jul 2023 00:20 )   PT: 13.3 sec;   INR: 1.12 ratio         PTT - ( 17 Jul 2023 00:20 )  PTT:27.3 sec    Urinalysis Basic - ( 17 Jul 2023 00:20 )    Color: x / Appearance: x / SG: x / pH: x  Gluc: 214 mg/dL / Ketone: x  / Bili: x / Urobili: x   Blood: x / Protein: x / Nitrite: x   Leuk Esterase: x / RBC: x / WBC x   Sq Epi: x / Non Sq Epi: x / Bacteria: x    Troponin I, High Sensitivity (07.17.23 @ 00:20)    Troponin I, High Sensitivity Result: 109.1: TYPE:(C=Critical, N=Notification, A=Abnormal) A  TESTS: _TROPIHS  DATE/TIME CALLED: _07/17/2023 00:53:24 EDT  CALLED TO: VENKAT THRASHER-NINOSKA  READ BACK (2 Patient Identifiers)(Y/N): _Y  READ BACK VALUES (Y/N): _Y  CALLED BY: WEI  Serial measurements of high sensitivity troponin I (hs TnI) showing rapid  upward or downward changes suggest acute myocardial injury.  Please note  that a sustained elevation of hsTnI can be caused by renal disease,  chronic heart failure, sepsis, pulmonary embolism and other clinical  conditions. ng/L    Troponin I, High Sensitivity (07.17.23 @ 12:10)    Troponin I, High Sensitivity Result: 185.4: Serial measurements of high sensitivity troponin I (hs TnI) showing rapid  upward or downward changes suggest acute myocardial injury.  Please note  that a sustained elevation of hsTnI can be caused by renal disease,  chronic heart failure, sepsis, pulmonary embolism and other clinical  conditions. ng/L    Respiratory Viral Panel with COVID-19 by BETSEY (07.17.23 @ 00:20)    Parainfluenza 3 (RapRVP): Detected    RADIOLOGY & ADDITIONAL STUDIES:    < from: Xray Chest 1 View- PORTABLE-Urgent (Xray Chest 1 View- PORTABLE-Urgent .) (07.17.23 @ 00:46) >  IMPRESSION:  Increased airspace opacities right lung may represent infiltrates in the   correct clinical setting. Correlate clinically and follow-up recommended.  Trace right pleural effusion  Heart size cannot be accurately assessed in this projection, but appear   enlarged.  --- End of Report ---  < end of copied text >    < from: TTE Echo Complete w/o Contrast w/ Doppler (02.17.23 @ 19:36) >  Summary:   1. Left ventricular ejection fraction, by visual estimation, is 45 to   50%.   2. Mildly to moderately decreased global left ventricular systolic   function.   3. Basal and mid anterior septum, basal inferoseptal segment, and basal   inferior segment are abnormalas described above.   4. There is moderate concentric left ventricular hypertrophy.   5. Prominent septal buldge.   6. Mild mitral annular calcification.   7. Mild mitral valve regurgitation.   8. Mild thickening and calcification of the anterior andposterior   mitral valve leaflets.   9. Moderate tricuspid regurgitation.  10. Sclerotic aortic valve with mildly decreased opening; THERESE 1.5cm2.  11. Mild to moderate pulmonic valve regurgitation.  12. Estimated pulmonary artery systolic pressure is 54.0 mmHg assuming a   right atrial pressure of 5 mmHg, which is consistent with moderate   pulmonary hypertension.  < end of copied text >    PROTEIN CALORIE MALNUTRITION PRESENT: [x ] Yes [ ] No  [x ] PPSV2 < or = to 30% [ ] significant weight loss  [ x] poor nutritional intake [ ] catabolic state [ ] anasarca     Artificial Nutrition [ ]     REFERRALS:   [ ]Chaplaincy  [ ] Hospice  [ ]Child Life  [ ]Social Work  [ ]Case management [ ]Holistic Therapy     Goals of Care Document:

## 2023-07-17 NOTE — PATIENT PROFILE ADULT - FALL HARM RISK - HARM RISK INTERVENTIONS

## 2023-07-17 NOTE — H&P ADULT - NSHPLABSRESULTS_GEN_ALL_CORE
10.3   9.38  )-----------( 198      ( 17 Jul 2023 00:20 )             35.3     07-17    138  |  108  |  48<H>  ----------------------------<  214<H>  4.8   |  21<L>  |  4.90<H>    Ca    8.6      17 Jul 2023 00:20  Phos  3.6     07-17  Mg     2.0     07-17    TPro  6.7  /  Alb  2.2<L>  /  TBili  0.6  /  DBili  x   /  AST  19  /  ALT  18  /  AlkPhos  87  07-17    PT/INR - ( 17 Jul 2023 00:20 )   PT: 13.3 sec;   INR: 1.12 ratio         PTT - ( 17 Jul 2023 00:20 )  PTT:27.3 sec  Urinalysis Basic - ( 17 Jul 2023 00:20 )    Color: x / Appearance: x / SG: x / pH: x  Gluc: 214 mg/dL / Ketone: x  / Bili: x / Urobili: x   Blood: x / Protein: x / Nitrite: x   Leuk Esterase: x / RBC: x / WBC x   Sq Epi: x / Non Sq Epi: x / Bacteria: x

## 2023-07-17 NOTE — CONSULT NOTE ADULT - ASSESSMENT
82 yo male admitted with acute resp distress, likely pulmonic source  Functional quadriplegia, vascular dementia  CKD5, refused dialysis  Known dysrhythmia, needed PPM in past, declined by patient/family.  ECG abnormalities on 12 lead (CRBBB, LAHB) appear unchanged as compaered to prior ECHGs.    The chart has been reviewed but the patient has not yet been examined.  Full note to follow. 84 yo male admitted with acute resp distress, likely pulmonic source (parainfluenza)  Functional quadriplegia, vascular dementia  CKD5, refused dialysis  Known dysrhythmia, needed PPM in past, declined by patient/family.  ECG abnormalities on 12 lead (CRBBB, LAHB) appear unchanged as compared to prior ECGs.  Not candidate for any further cardiac evaluation given overall debility and dementia.  Conservative/Palliative care optimal. Continue hospice care.  Please reconsult PRN.

## 2023-07-17 NOTE — ED PROVIDER NOTE - OBJECTIVE STATEMENT
81 yo M w/ PMH of CKD5 not on HD, HTN, DM2, HD, Dementia presenting with respiratory distress.      Patient is currently on home hospice. Collateral information from son Miguel A states that patient started coughing this morning with productive. No fevers at home. Lives at home with 24 hour HHA. At baseline, patient is verbal and is AAOx2 to self and place usually and can ambulate short distances. Son is considering DNR but patient currently Full Code. 83 yo M w/ PMH of CKD5 not on HD, HTN, DM2, Dementia presenting with respiratory distress.      Patient is currently on home hospice. Collateral information from son Miguel A states that patient started coughing this morning with productive sputum. No fevers at home. Lives at home with 24 hour HHA. At baseline, patient is verbal and is AAOx2 to self and place usually and can ambulate short distances. Son is considering DNR but patient currently Full Code.

## 2023-07-17 NOTE — CONSULT NOTE ADULT - ASSESSMENT
83 year old male PMH of CKD (deferred HD), dementia, HF, HTN, HLD presented to the ED for respiratory distress.  Patient recently hospitalized, renal failure, family deferred HD and sent patient home with hospice.  Patient found to have parainfluenza.  Palliative care consulted for GOC.

## 2023-07-17 NOTE — ED PROVIDER NOTE - CARE PLAN
Principal Discharge DX:	Acute respiratory failure with hypoxia  Secondary Diagnosis:	Multifocal pneumonia   1

## 2023-07-17 NOTE — H&P ADULT - HISTORY OF PRESENT ILLNESS
83 year old male who is currently on home Hospice  with a PMH of 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  As per the son, this morning patient started to have a productive cough, this afternoon he was found to have difficulty breathing which prompted the EMS call. Denies any fever/chills. Upon ED arrival patient was placed on NRB then switched to 2L NC.  Upon evaluation, patient was on RA saturating 97%, no acute distress. Patient was found to be positive for Para-Influenza 3. Afebrile, no Leukocytosis.   Patient is currently full code, son is considering DNR.

## 2023-07-17 NOTE — CONSULT NOTE ADULT - PROBLEM SELECTOR RECOMMENDATION 5
See GOC above.  Patient to return home with hospice as soon as possible.  Son, Oscar to think about wishes regarding LST, aware he can complete MOLST at anytime with a member of the team.  Palliative to follow.     Discussed with Dr. Chery and ELIZABETH Hooker

## 2023-07-17 NOTE — ED PROVIDER NOTE - NSICDXPASTMEDICALHX_GEN_ALL_CORE_FT
PAST MEDICAL HISTORY:  CHF (congestive heart failure)     DM (diabetes mellitus)      PAST MEDICAL HISTORY:  CHF (congestive heart failure)     DM (diabetes mellitus)     Stage 5 chronic kidney disease not on chronic dialysis

## 2023-07-17 NOTE — GOALS OF CARE CONVERSATION - ADVANCED CARE PLANNING - CONVERSATION DETAILS
Spoke with Son Oscar regarding clarification of patients hospice/MOLST status and current diagnosis/prognosis. Oscar explained that referral for home hospice and home care was made from prior admission, as patient is end stage renal disease with decision to defer dialysis. He is being treated at home to make him comforable regarding the symptoms of his CKD, but does not have a MOLST and no decision was made prior for DNR/DNI. Explained to son that as patient has ESRD, underlying heart disease, sacral ulcers, etc, and is currently admitted with a respiratory infection and now is displaying early signs of heart disease on EKG. Son expressed that he would like his father to live longer, but be comfortable, but would like to pursue  further testing and treatment. I explained the nature of resuscitation in the event that his father stops breathing or no longer has a pulse, including the need for chest compressions, breathing tubes and other painful measures. We discussed how these things may be in conflict with his goal of making his father comftorable, and that they would not likely prolong his life in any meaningful way. Oscar would like to think more, but would like to sign a MOLST form later today regarding code status.

## 2023-07-17 NOTE — ED PROVIDER NOTE - CLINICAL SUMMARY MEDICAL DECISION MAKING FREE TEXT BOX
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  CXR suggestive of pneumonia-covered with CTX/Azithromycin but RVP later resulted positive for +parainfluenza  Patient on home hospice-discussed with patient's son who was amenable to admission for observation and antibiotics.

## 2023-07-17 NOTE — PROVIDER CONTACT NOTE (CRITICAL VALUE NOTIFICATION) - NS PROVIDER READ BACK
Pt daughter in law  Natty called stating she tried to schedule appointment for Neurologist and they told her they need a specific reason why she needs to see them. Natty also told them her elevated Kinase but they still wont schedule and told her she might need a different specialist. Please advice  
yes

## 2023-07-17 NOTE — H&P ADULT - ASSESSMENT
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  As per the son, this morning patient started to have a productive cough, this afternoon he was found to have difficulty breathing which prompted the EMS call. Denies any fever/chills. Upon ED arrival patient was placed on NRB then switched to 2L NC.  Upon evaluation, patient was on RA saturating 97%, no acute distress. Patient was found to be positive for Para-Influenza 3. Afebrile, no Leukocytosis.   Patient is currently full code, son is considering DNR.

## 2023-07-17 NOTE — ED PROVIDER NOTE - PHYSICAL EXAMINATION
GENERAL: no acute respiratory distress   HEAD:  Atraumatic, Normocephalic  EYES: EOMI, PERRLA,   ENT: MMM; oropharynx clear  NECK: Supple, No JVD  CHEST/LUNG: +Ronchorous breath sounds   HEART: Regular rate and rhythm; No murmurs, rubs, or gallops  ABDOMEN: Soft, Nontender, Nondistended; Bowel sounds present  EXTREMITIES:  2+ Peripheral Pulses, No clubbing, cyanosis, or edema  NEUROLOGY: no focal motor or sensory deficits. 5/5 muscle strength in all extremities.   SKIN: No rashes or lesions

## 2023-07-17 NOTE — CONSULT NOTE ADULT - CONVERSATION DETAILS
Spoke with patient's son, Oscar [phone] via phone as he was not available to speak in person.  Patient was on hospice prior, wishes for patient to return home with hospice services as soon as possible.  Discussed patient has parainfluenza which is a virus and treatment is supportive management and time.  We discussed wishes regarding LST such as CPR.  Discussed patient would not benefit from interventions such as CPR as he has dementia and end stage kidney disease and HF and we would prolong suffering and cause more harm than benefit.  He said he is planning to visit this evening and will think about it.  He asked about having to complete the form prior to discharge.  Explained it is not a requirement, but that it would be in the patient's best interest to make wishes regarding those interventions known so if an emergency were to arise, the team would act according to known wishes and not do things such as CPR that would not benefit the patient.

## 2023-07-17 NOTE — CONSULT NOTE ADULT - SUBJECTIVE AND OBJECTIVE BOX
CARDIOLOGY CONSULTATION NOTE                                                                               KESHA OLIVARES is a 83y Male on home Hospice  with a PMH of CKD5 (declined HD), h/o HTN, DM2, HLD, CHF, Dementia.    Admitted with acute respiratory distress.  As per the son, this morning patient started to have a productive cough, this afternoon he was found to have difficulty breathing which prompted the EMS call. Denies any fever/chills. Upon ED arrival patient was placed on NRB then switched to 2L NC.  Upon evaluation, patient was on RA saturating 97%, no acute distress. Patient was found to be positive for Para-Influenza 3. Afebrile, no Leukocytosis.   Patient is currently full code, son is considering DNR.   Consult called by Hospitalist team for ECG abnormality.      REVIEW OF SYSTEMS:  -----------------------------    CONSTITUTIONAL: No fever, weight loss, or fatigue  EYES: No eye pain, visual disturbances, or discharge  ENMT:  No difficulty hearing, tinnitus, vertigo; No sinus or throat pain  NECK: No pain or stiffness  BREASTS: No pain, masses, or nipple discharge  RESPIRATORY: No cough, wheezing, chills or hemoptysis; No shortness of breath  CARDIOVASCULAR: See HPI  GASTROINTESTINAL: No abdominal or epigastric pain. No nausea, vomiting, or hematemesis; No diarrhea or constipation. No melena or hematochezia.  GENITOURINARY: No dysuria, frequency, hematuria, or incontinence  NEUROLOGICAL: No headaches, memory loss, loss of strength, numbness, or tremors  SKIN: No itching, burning, rashes, or lesions   LYMPH NODES: No enlarged glands  ENDOCRINE: No heat or cold intolerance; No hair loss  MUSCULOSKELETAL: No joint pain or swelling; No muscle, back, or extremity pain  PSYCHIATRIC: No depression, anxiety, mood swings, or difficulty sleeping  HEME/LYMPH: No easy bruising, or bleeding gums  ALLERGY AND IMMUNOLOGIC: No hives or eczema    Home Medications:  amiodarone 200 mg oral tablet: 1 tab(s) orally once a day (2023 22:38)  atorvastatin 40 mg oral tablet: 1 tab(s) orally once a day (at bedtime) (2023 22:38)  brimonidine 0.2% ophthalmic solution: 1 drop(s) to each affected eye 2 times a day (2023 22:38)  dorzolamide-timolol 2%-0.5% preservative-free ophthalmic solution: 1 drop(s) to each affected eye 2 times a day (2023 22:38)  hydrALAZINE 25 mg oral tablet: 3 tab(s) orally 3 times a day (2023 22:38)  isosorbide mononitrate 60 mg oral tablet, extended release: 1 tab(s) orally once a day (2023 22:38)  Multiple Vitamins with Minerals oral tablet: 1 tab(s) orally once a day (2023 22:38)  prednisoLONE acetate 1% ophthalmic suspension: 1 drop(s) to each affected eye 3 times a day (2023 22:38)  sodium bicarbonate 650 mg oral tablet: 2 tab(s) orally 2 times a day (2023 22:38)      MEDICATIONS  (STANDING):  apixaban 2.5 milliGRAM(s) Oral two times a day  aspirin  chewable 81 milliGRAM(s) Oral daily  atorvastatin 40 milliGRAM(s) Oral at bedtime  dextrose 5%. 1000 milliLiter(s) (50 mL/Hr) IV Continuous <Continuous>  dextrose 50% Injectable 25 Gram(s) IV Push once  glucagon  Injectable 1 milliGRAM(s) IntraMuscular once  hydrALAZINE 25 milliGRAM(s) Oral three times a day  insulin lispro (ADMELOG) corrective regimen sliding scale   SubCutaneous three times a day before meals  isosorbide   mononitrate ER Tablet (IMDUR) 60 milliGRAM(s) Oral daily  multivitamin/minerals 1 Tablet(s) Oral daily  pantoprazole    Tablet 40 milliGRAM(s) Oral before breakfast      ALLERGIES: No Known Allergies      FAMILY HISTORY:      PHYSICAL EXAMINATION:  -----------------------------  T(C): 36.4 (23 @ 08:42), Max: 38.3 (23 @ 23:03)  HR: 90 (23 @ 08:42) (85 - 112)  BP: 150/100 (23 @ 08:42) (131/76 - 174/110)  RR: 15 (23 @ 08:42) (15 - 20)  SpO2: 99% (23 @ 08:42) (96% - 100%)  Wt(kg): --    Height (cm): 172.7 ( @ 21:55)  Weight (kg): 68 ( @ 21:55)  BMI (kg/m2): 22.8 ( @ 21:55)  BSA (m2): 1.81 ( @ 21:55)    Constitutional: well developed, normal appearance, well groomed, well nourished, no deformities and no acute distress.   Eyes: the conjunctiva exhibited no abnormalities and the eyelids demonstrated no xanthelasmas.   HEENT: normal oral mucosa, no oral pallor and no oral cyanosis.   Neck: normal jugular venous A waves present, normal jugular venous V waves present and no jugular venous jj A waves.   Pulmonary: no respiratory distress, normal respiratory rhythm and effort, no accessory muscle use and lungs were clear to auscultation bilaterally.   Cardiovascular: heart rate and rhythm were normal, normal S1 and S2 and no murmur, gallop, rub, heave or thrill are present.   Abdomen: soft, non-tender, no hepato-splenomegaly and no abdominal mass palpated.   Musculoskeletal: the gait could not be assessed..   Extremities: no clubbing of the fingernails, no localized cyanosis, no petechial hemorrhages and no ischemic changes.   Skin: normal skin color and pigmentation, no rash, no venous stasis, no skin lesions, no skin ulcer and no xanthoma was observed.   Psychiatric: oriented to person, place, and time, the affect was normal, the mood was normal and not feeling anxious.     ECG:  -------        LABS:   --------      138  |  108  |  48<H>  ----------------------------<  214<H>  4.8   |  21<L>  |  4.90<H>    Ca    8.6      2023 00:20  Phos  3.6       Mg     2.0         TPro  6.7  /  Alb  2.2<L>  /  TBili  0.6  /  DBili  x   /  AST  19  /  ALT  18  /  AlkPhos  87                           10.3   9.38  )-----------( 198      ( 2023 00:20 )             35.3     PT/INR - ( 2023 00:20 )   PT: 13.3 sec;   INR: 1.12 ratio         PTT - ( 2023 00:20 )  PTT:27.3 sec            RADIOLOGY REPORTS:  -----------------------------  < from: Xray Chest 1 View- PORTABLE-Urgent (Xray Chest 1 View- PORTABLE-Urgent .) (23 @ 14:37) >    ACC: [id number] EXAM:  XR CHEST PORTABLE URGENT 1V   ORDERED BY: DONG COPE     PROCEDURE DATE:  2023          INTERPRETATION:  INDICATION: Left pneumonia    COMPARISON: 2023 at 9:30 PM    FINDINGS:  Heart/Vascular: Heart size, mediastinal and hilar contours are unchanged   and within normal limits despite the rotated portable technique.  Pulmonary: New probable anterior segment right upper lobe pneumonia with   some volume loss and right hemidiaphragm elevation. No new large pleural   effusions. Limited evaluation of the right lung apex due to obscuration   by the patient's overlying chin. No large pneumothorax however.  Bones: No acute soft tissue or osseous abnormality.    Impression:  New right upper lobe pneumonia/atelectasis.        --- End of Report ---            KAYLEIGH GREENE MD; Attending Radiologist  This document has been electronically signed. Magen  3 2023  2:49PM    < end of copied text >        ECHOCARDIOGRAM:  ---------------------------  < from: TTE Echo Complete w/o Contrast w/ Doppler (23 @ 19:36) >    ACC: [id number] EXAM:  ECHO TTE WO CON COMP W DOPP                          PROCEDURE DATE:  2023          INTERPRETATION:  TRANSTHORACIC ECHOCARDIOGRAM REPORT        Patient Name:   KESHA OLIVARES Patient Location: [facility] Rec #:  [id number]       Accession #:      [id number]  Account #:                      Height:           68.0 in 172.7 cm  YOB: 1940       Weight:           181.0 lb 82.10 kg  Patient Age:    82 years        BSA:              1.96 m²  Patient Gender: M            BP:               178/99 mmHg      Date of Exam:        2023 7:36:42 PM  Sonographer:         AMADO  Referring Physician: GORDO    Procedure:   2D Echo/Doppler/Color Doppler Complete.  Indications: I42.9 - Cardiomyopathy, unspecified  Diagnosis:   I42.9 - Cardiomyopathy, unspecified        2D AND M-MODE MEASUREMENTS (normal ranges within parentheses):  Left                 Normal   Aorta/Left            Normal  Ventricle:                    Atrium:  IVSd (2D):    1.04  (0.7-1.1) Aortic Root    3.61  (2.4-3.7)                 cm             (2D):           cm  LVPWd (2D):   1.18  (0.7-1.1) Left Atrium    3.81  (1.9-4.0)                 cm             (2D):           cm  LVIDd (2D):   5.12  (3.4-5.7) LA Vol Index   25.1       cm             (A4C):        ml/m²  LVIDs (2D):   3.64            LA Vol Index   37.1                 cm             (A2C):        ml/m²  LV FS (2D):   28.8   (>25%)   LA Vol Index   31.9                  %             (BP):         ml/m²  Relative Wall 0.46   (<0.42)  Right  Thickness                     Ventricle:                                TAPSE:          1.68 cm    LV DIASTOLIC FUNCTION:  MV Peak E: 0.92 m/s Decel Time:  116 msec  MV Peak A: 0.33 m/s Septal E/e'  9.5  E/A Ratio: 2.81    Lateral E/e' 8.3  Septal e'  0.1 m/s  Lateral e' 0.1 m/s    SPECTRAL DOPPLER ANALYSIS (where applicable):  Mitral Valve:  MV P1/2 Time: 33.65 msec  MV Area, PHT: 6.54 cm²    Aortic Valve: AoV Max Mc: 1.48 m/s AoV Peak P.7 mmHg AoV Mean P.4 mmHg    LVOT Vmax: 0.82 m/s LVOT VTI: 0.102 m LVOT Diameter: 2.00 cm    AoV Area, Vmax: 1.75 cm² AoV Area, VTI: 1.54 cm² AoV Area, Vmn: 1.37 cm²    Tricuspid Valve and PA/RV Systolic Pressure: TR Max Velocity: 3.50 m/s RA   Pressure: 5 mmHg RVSP/PASP: 54.0 mmHg      PHYSICIAN INTERPRETATION:  Left Ventricle:  Global LV systolic function was mildly to moderately decreased. Left   ventricular ejection fraction, by visual estimation, is 45 to 50%.      LV Wall Scoring:  The basal and mid anterior septum, basal inferoseptal segment, and basal  inferior segment are hypokinetic. All remaining scored segments are   normal.    Right Ventricle: Normal right ventricular size and function.  Left Atrium: The left atrium is normal in size.  Right Atrium: The right atrium is normal in size.  Mitral Valve: Mild thickening and calcification of the anterior and   posterior mitral valve leaflets. There is mild mitral annular   calcification. Mild mitral valve regurgitation is seen.  Tricuspid Valve: The tricuspid valve is normal in structure. Moderate   tricuspid regurgitation is visualized. Estimated pulmonary artery   systolic pressure is 54.0 mmHg assuming a right atrial pressure of 5   mmHg, which is consistent with moderate pulmonary hypertension.  Aortic Valve: Sclerotic aortic valve with decreased opening. Trivial   aortic valve regurgitation is seen.  Pulmonic Valve: The pulmonic valve was not well visualized. Mild to   moderate pulmonic valve regurgitation.      Summary:   1. Left ventricular ejection fraction, by visual estimation, is 45 to 50%.   2. Mildly to moderately decreased global left ventricular systolic function.   3. Basal and mid anterior septum, basal inferoseptal segment, and basal inferior segment are abnormalas described above.   4. There is moderate concentric left ventricular hypertrophy.   5. Prominent septal buldge.   6. Mild mitral annular calcification.   7. Mild mitral valve regurgitation.   8. Mild thickening and calcification of the anterior andposterior mitral valve leaflets.   9. Moderate tricuspid regurgitation.  10. Sclerotic aortic valve with mildly decreased opening; THERESE 1.5cm2.  11. Mild to moderate pulmonic valve regurgitation.  12. Estimated pulmonary artery systolic pressure is 54.0 mmHg assuming a right atrial pressure of 5 mmHg, which is consistent with moderate   pulmonary hypertension.      [id number] Kwaku Glover MD  Electronically signed on 2023 at 12:21:35 PM            *** Final ***    < end of copied text >       CARDIOLOGY CONSULTATION NOTE                                                                               KESHA OLIVARES is a 83y Male on home Hospice  with a PMH of CKD5 (declined HD), h/o HTN, DM2, HLD, CHF, Dementia.    Admitted with acute respiratory distress.  As per the son, this morning patient started to have a productive cough, this afternoon he was found to have difficulty breathing which prompted the EMS call. Denies any fever/chills. Upon ED arrival patient was placed on NRB then switched to 2L NC.  Upon evaluation, patient was on RA saturating 97%, no acute distress. Patient was found to be positive for Para-Influenza 3. Afebrile, no Leukocytosis.   Patient is currently full code, son is considering DNR.   Consult called by Hospitalist team for ECG abnormality.  Patient refusing examination.      REVIEW OF SYSTEMS: NA  -----------------------------    Home Medications:  amiodarone 200 mg oral tablet: 1 tab(s) orally once a day (2023 22:38)  atorvastatin 40 mg oral tablet: 1 tab(s) orally once a day (at bedtime) (2023 22:38)  brimonidine 0.2% ophthalmic solution: 1 drop(s) to each affected eye 2 times a day (2023 22:38)  dorzolamide-timolol 2%-0.5% preservative-free ophthalmic solution: 1 drop(s) to each affected eye 2 times a day (2023 22:38)  hydrALAZINE 25 mg oral tablet: 3 tab(s) orally 3 times a day (2023 22:38)  isosorbide mononitrate 60 mg oral tablet, extended release: 1 tab(s) orally once a day (2023 22:38)  Multiple Vitamins with Minerals oral tablet: 1 tab(s) orally once a day (2023 22:38)  prednisoLONE acetate 1% ophthalmic suspension: 1 drop(s) to each affected eye 3 times a day (2023 22:38)  sodium bicarbonate 650 mg oral tablet: 2 tab(s) orally 2 times a day (2023 22:38)      MEDICATIONS  (STANDING):  apixaban 2.5 milliGRAM(s) Oral two times a day  aspirin  chewable 81 milliGRAM(s) Oral daily  atorvastatin 40 milliGRAM(s) Oral at bedtime  dextrose 5%. 1000 milliLiter(s) (50 mL/Hr) IV Continuous <Continuous>  dextrose 50% Injectable 25 Gram(s) IV Push once  glucagon  Injectable 1 milliGRAM(s) IntraMuscular once  hydrALAZINE 25 milliGRAM(s) Oral three times a day  insulin lispro (ADMELOG) corrective regimen sliding scale   SubCutaneous three times a day before meals  isosorbide   mononitrate ER Tablet (IMDUR) 60 milliGRAM(s) Oral daily  multivitamin/minerals 1 Tablet(s) Oral daily  pantoprazole    Tablet 40 milliGRAM(s) Oral before breakfast      ALLERGIES: No Known Allergies      FAMILY HISTORY:      PHYSICAL EXAMINATION:  -----------------------------  T(C): 36.4 (23 @ 08:42), Max: 38.3 (23 @ 23:03)  HR: 90 (23 @ 08:42) (85 - 112)  BP: 150/100 (23 @ 08:42) (131/76 - 174/110)  RR: 15 (23 @ 08:42) (15 - 20)  SpO2: 99% (23 @ 08:42) (96% - 100%)  Wt(kg): --    Height (cm): 172.7 ( @ 21:55)  Weight (kg): 68 ( @ 21:55)  BMI (kg/m2): 22.8 ( @ 21:55)  BSA (m2): 1.81 ( @ 21:55)    As per H&P:    Physical Exam:   CONSTITUTIONAL:  No apparent distress  NECK: Supple, symmetric and without tracheal deviation   RESP:  No increase work of breaking, B/L crackles at base, No use of accessory muscles  CV: RRR, No murmurs   GI: Soft, NT, ND, no rebound, no guarding  MSK: No derformities, B/L +2 pitting edema   SKIN: No rashes or ulcers noted  NEURO: AAOx1      ECG:  -------  < from: 12 Lead ECG (23 @ 12:52) >    Ventricular Rate 87 BPM    Atrial Rate 83 BPM    QRS Duration 182 ms    Q-T Interval 480 ms    QTC Calculation(Bazett) 577 ms    R Axis 260 degrees    T Axis 62 degrees    Diagnosis Line Sinus rhythm with 1st degree AV block  Right bundle branch block  Inferior infarct , old  Abnormal ECG    Confirmed by José Luis Multani MD ([id number]) on 2023 1:39:11 PM    < end of copied text >        LABS:   --------      138  |  108  |  48<H>  ----------------------------<  214<H>  4.8   |  21<L>  |  4.90<H>    Ca    8.6      2023 00:20  Phos  3.6       Mg     2.0         TPro  6.7  /  Alb  2.2<L>  /  TBili  0.6  /  DBili  x   /  AST  19  /  ALT  18  /  AlkPhos  87                           10.3   9.38  )-----------( 198      ( 2023 00:20 )             35.3     PT/INR - ( 2023 00:20 )   PT: 13.3 sec;   INR: 1.12 ratio         PTT - ( 2023 00:20 )  PTT:27.3 sec            RADIOLOGY REPORTS:  -----------------------------  < from: Xray Chest 1 View- PORTABLE-Urgent (Xray Chest 1 View- PORTABLE-Urgent .) (23 @ 14:37) >    ACC: [id number] EXAM:  XR CHEST PORTABLE URGENT 1V   ORDERED BY: DONG COPE     PROCEDURE DATE:  2023          INTERPRETATION:  INDICATION: Left pneumonia    COMPARISON: 2023 at 9:30 PM    FINDINGS:  Heart/Vascular: Heart size, mediastinal and hilar contours are unchanged   and within normal limits despite the rotated portable technique.  Pulmonary: New probable anterior segment right upper lobe pneumonia with   some volume loss and right hemidiaphragm elevation. No new large pleural   effusions. Limited evaluation of the right lung apex due to obscuration   by the patient's overlying chin. No large pneumothorax however.  Bones: No acute soft tissue or osseous abnormality.    Impression:  New right upper lobe pneumonia/atelectasis.        --- End of Report ---            KAYLEIGH GREENE MD; Attending Radiologist  This document has been electronically signed. Magen  3 2023  2:49PM    < end of copied text >        ECHOCARDIOGRAM:  ---------------------------  < from: TTE Echo Complete w/o Contrast w/ Doppler (23 @ 19:36) >    ACC: [id number] EXAM:  ECHO TTE WO CON COMP W DOPP                          PROCEDURE DATE:  2023          INTERPRETATION:  TRANSTHORACIC ECHOCARDIOGRAM REPORT        Patient Name:   KESHA OLIVARES Patient Location: [facility] Rec #:  [id number]       Accession #:      [id number]  Account #:                      Height:           68.0 in 172.7 cm  YOB: 1940       Weight:           181.0 lb 82.10 kg  Patient Age:    82 years        BSA:              1.96 m²  Patient Gender: M            BP:               178/99 mmHg      Date of Exam:        2023 7:36:42 PM  Sonographer:         AMADO  Referring Physician: GORDO    Procedure:   2D Echo/Doppler/Color Doppler Complete.  Indications: I42.9 - Cardiomyopathy, unspecified  Diagnosis:   I42.9 - Cardiomyopathy, unspecified        2D AND M-MODE MEASUREMENTS (normal ranges within parentheses):  Left                 Normal   Aorta/Left            Normal  Ventricle:                    Atrium:  IVSd (2D):    1.04  (0.7-1.1) Aortic Root    3.61  (2.4-3.7)                 cm             (2D):           cm  LVPWd (2D):   1.18  (0.7-1.1) Left Atrium    3.81  (1.9-4.0)                 cm             (2D):           cm  LVIDd (2D):   5.12  (3.4-5.7) LA Vol Index   25.1       cm             (A4C):        ml/m²  LVIDs (2D):   3.64            LA Vol Index   37.1                 cm             (A2C):        ml/m²  LV FS (2D):   28.8   (>25%)   LA Vol Index   31.9                  %             (BP):         ml/m²  Relative Wall 0.46   (<0.42)  Right  Thickness                     Ventricle:                                TAPSE:          1.68 cm    LV DIASTOLIC FUNCTION:  MV Peak E: 0.92 m/s Decel Time:  116 msec  MV Peak A: 0.33 m/s Septal E/e'  9.5  E/A Ratio: 2.81    Lateral E/e' 8.3  Septal e'  0.1 m/s  Lateral e' 0.1 m/s    SPECTRAL DOPPLER ANALYSIS (where applicable):  Mitral Valve:  MV P1/2 Time: 33.65 msec  MV Area, PHT: 6.54 cm²    Aortic Valve: AoV Max Mc: 1.48 m/s AoV Peak P.7 mmHg AoV Mean P.4 mmHg    LVOT Vmax: 0.82 m/s LVOT VTI: 0.102 m LVOT Diameter: 2.00 cm    AoV Area, Vmax: 1.75 cm² AoV Area, VTI: 1.54 cm² AoV Area, Vmn: 1.37 cm²    Tricuspid Valve and PA/RV Systolic Pressure: TR Max Velocity: 3.50 m/s RA   Pressure: 5 mmHg RVSP/PASP: 54.0 mmHg      PHYSICIAN INTERPRETATION:  Left Ventricle:  Global LV systolic function was mildly to moderately decreased. Left   ventricular ejection fraction, by visual estimation, is 45 to 50%.      LV Wall Scoring:  The basal and mid anterior septum, basal inferoseptal segment, and basal  inferior segment are hypokinetic. All remaining scored segments are   normal.    Right Ventricle: Normal right ventricular size and function.  Left Atrium: The left atrium is normal in size.  Right Atrium: The right atrium is normal in size.  Mitral Valve: Mild thickening and calcification of the anterior and   posterior mitral valve leaflets. There is mild mitral annular   calcification. Mild mitral valve regurgitation is seen.  Tricuspid Valve: The tricuspid valve is normal in structure. Moderate   tricuspid regurgitation is visualized. Estimated pulmonary artery   systolic pressure is 54.0 mmHg assuming a right atrial pressure of 5   mmHg, which is consistent with moderate pulmonary hypertension.  Aortic Valve: Sclerotic aortic valve with decreased opening. Trivial   aortic valve regurgitation is seen.  Pulmonic Valve: The pulmonic valve was not well visualized. Mild to   moderate pulmonic valve regurgitation.      Summary:   1. Left ventricular ejection fraction, by visual estimation, is 45 to 50%.   2. Mildly to moderately decreased global left ventricular systolic function.   3. Basal and mid anterior septum, basal inferoseptal segment, and basal inferior segment are abnormalas described above.   4. There is moderate concentric left ventricular hypertrophy.   5. Prominent septal buldge.   6. Mild mitral annular calcification.   7. Mild mitral valve regurgitation.   8. Mild thickening and calcification of the anterior andposterior mitral valve leaflets.   9. Moderate tricuspid regurgitation.  10. Sclerotic aortic valve with mildly decreased opening; THERESE 1.5cm2.  11. Mild to moderate pulmonic valve regurgitation.  12. Estimated pulmonary artery systolic pressure is 54.0 mmHg assuming a right atrial pressure of 5 mmHg, which is consistent with moderate   pulmonary hypertension.      [id number] Kwaku Glover MD  Electronically signed on 2023 at 12:21:35 PM            *** Final ***    < end of copied text >

## 2023-07-17 NOTE — H&P ADULT - NSHPPHYSICALEXAM_GEN_ALL_CORE
CONSTITUTIONAL:  No apparent distress  NECK: Supple, symmetric and without tracheal deviation   RESP:  No increase work of breaking, B/L crackles at base, No use of accessory muscles  CV: RRR, No murmurs   GI: Soft, NT, ND, no rebound, no guarding  MSK: No derformities, B/L +2 pitting edema   SKIN: No rashes or ulcers noted  NEURO: AAOx1

## 2023-07-17 NOTE — CONSULT NOTE ADULT - PROBLEM SELECTOR RECOMMENDATION 3
prior CTH with chronic infarcts, bed bound, requires help for all care, no behavioral disturbances on exam   was on hospice prior  promote sleep/wake cycles. family presence and cluster care

## 2023-07-17 NOTE — ED PROVIDER NOTE - PROGRESS NOTE DETAILS
Discussed with decision-maker Miguel A who is amenable to antibiotics and admission to hospital for further care. Patient currently stable on room air. Repeat lactate and troponin pending.

## 2023-07-18 ENCOUNTER — TRANSCRIPTION ENCOUNTER (OUTPATIENT)
Age: 83
End: 2023-07-18

## 2023-07-18 LAB
A1C WITH ESTIMATED AVERAGE GLUCOSE RESULT: 8.4 % — HIGH (ref 4–5.6)
APPEARANCE UR: CLEAR — SIGNIFICANT CHANGE UP
BACTERIA # UR AUTO: NEGATIVE — SIGNIFICANT CHANGE UP
BILIRUB UR-MCNC: NEGATIVE — SIGNIFICANT CHANGE UP
CHOLEST SERPL-MCNC: 178 MG/DL — SIGNIFICANT CHANGE UP
COLOR SPEC: YELLOW — SIGNIFICANT CHANGE UP
DIFF PNL FLD: NEGATIVE — SIGNIFICANT CHANGE UP
ESTIMATED AVERAGE GLUCOSE: 194 MG/DL — HIGH (ref 68–114)
GLUCOSE BLDC GLUCOMTR-MCNC: 106 MG/DL — HIGH (ref 70–99)
GLUCOSE BLDC GLUCOMTR-MCNC: 156 MG/DL — HIGH (ref 70–99)
GLUCOSE UR QL: 100 MG/DL
HCT VFR BLD CALC: 35.8 % — LOW (ref 39–50)
HDLC SERPL-MCNC: 62 MG/DL — SIGNIFICANT CHANGE UP
HGB BLD-MCNC: 11 G/DL — LOW (ref 13–17)
KETONES UR-MCNC: NEGATIVE — SIGNIFICANT CHANGE UP
LEUKOCYTE ESTERASE UR-ACNC: NEGATIVE — SIGNIFICANT CHANGE UP
LIPID PNL WITH DIRECT LDL SERPL: 104 MG/DL — HIGH
MCHC RBC-ENTMCNC: 27.7 PG — SIGNIFICANT CHANGE UP (ref 27–34)
MCHC RBC-ENTMCNC: 30.7 G/DL — LOW (ref 32–36)
MCV RBC AUTO: 90.2 FL — SIGNIFICANT CHANGE UP (ref 80–100)
NITRITE UR-MCNC: NEGATIVE — SIGNIFICANT CHANGE UP
NON HDL CHOLESTEROL: 116 MG/DL — SIGNIFICANT CHANGE UP
NRBC # BLD: 0 /100 WBCS — SIGNIFICANT CHANGE UP (ref 0–0)
PH UR: 6 — SIGNIFICANT CHANGE UP (ref 5–8)
PLATELET # BLD AUTO: 155 K/UL — SIGNIFICANT CHANGE UP (ref 150–400)
PROT UR-MCNC: 500 MG/DL
RBC # BLD: 3.97 M/UL — LOW (ref 4.2–5.8)
RBC # FLD: 15.6 % — HIGH (ref 10.3–14.5)
RBC CASTS # UR COMP ASSIST: NEGATIVE /HPF — SIGNIFICANT CHANGE UP (ref 0–4)
SP GR SPEC: 1.01 — SIGNIFICANT CHANGE UP (ref 1.01–1.02)
TRIGL SERPL-MCNC: 62 MG/DL — SIGNIFICANT CHANGE UP
TROPONIN I, HIGH SENSITIVITY RESULT: 170.7 NG/L — HIGH
UROBILINOGEN FLD QL: NEGATIVE MG/DL — SIGNIFICANT CHANGE UP
WBC # BLD: 8.56 K/UL — SIGNIFICANT CHANGE UP (ref 3.8–10.5)
WBC # FLD AUTO: 8.56 K/UL — SIGNIFICANT CHANGE UP (ref 3.8–10.5)
WBC UR QL: NEGATIVE — SIGNIFICANT CHANGE UP

## 2023-07-18 RX ORDER — IPRATROPIUM/ALBUTEROL SULFATE 18-103MCG
3 AEROSOL WITH ADAPTER (GRAM) INHALATION EVERY 6 HOURS
Refills: 0 | Status: DISCONTINUED | OUTPATIENT
Start: 2023-07-18 | End: 2023-07-19

## 2023-07-18 RX ORDER — IPRATROPIUM/ALBUTEROL SULFATE 18-103MCG
3 AEROSOL WITH ADAPTER (GRAM) INHALATION
Qty: 360 | Refills: 0
Start: 2023-07-18 | End: 2023-08-16

## 2023-07-18 RX ADMIN — PANTOPRAZOLE SODIUM 40 MILLIGRAM(S): 20 TABLET, DELAYED RELEASE ORAL at 05:01

## 2023-07-18 RX ADMIN — APIXABAN 2.5 MILLIGRAM(S): 2.5 TABLET, FILM COATED ORAL at 05:01

## 2023-07-18 RX ADMIN — Medication 3 MILLILITER(S): at 17:12

## 2023-07-18 RX ADMIN — Medication 3 MILLILITER(S): at 23:44

## 2023-07-18 RX ADMIN — ATORVASTATIN CALCIUM 40 MILLIGRAM(S): 80 TABLET, FILM COATED ORAL at 21:36

## 2023-07-18 RX ADMIN — Medication 2: at 11:51

## 2023-07-18 RX ADMIN — APIXABAN 2.5 MILLIGRAM(S): 2.5 TABLET, FILM COATED ORAL at 17:44

## 2023-07-18 RX ADMIN — Medication 3 MILLILITER(S): at 09:58

## 2023-07-18 RX ADMIN — Medication 25 MILLIGRAM(S): at 05:01

## 2023-07-18 RX ADMIN — OLANZAPINE 2.5 MILLIGRAM(S): 15 TABLET, FILM COATED ORAL at 21:36

## 2023-07-18 RX ADMIN — Medication 3 MILLIGRAM(S): at 21:36

## 2023-07-18 RX ADMIN — Medication 3 MILLILITER(S): at 11:44

## 2023-07-18 RX ADMIN — Medication 25 MILLIGRAM(S): at 21:36

## 2023-07-18 NOTE — PROGRESS NOTE ADULT - NSPROGADDITIONALINFOA_GEN_ALL_CORE
Stage II sacral  Vascular consulted for possible debridement    Ultimate dispo back to hospice Stage III sacral  Vascular consulted for possible debridement    Ultimate dispo back to hospice

## 2023-07-18 NOTE — CONSULT NOTE ADULT - NS ATTEND AMEND GEN_ALL_CORE FT
83M w/ CKD, HTN, DM, HLD, CHF, Dementia. Currently on home hospice. Presented w/ respiratory distress, found to be paraflu+. Pt was never hypoxic, afebrile. Pt seen and examined- has loud upper airway sounds/congested.     - add duoneb for airway clearance, q6 ATC  - no evidence of discreet consolidation on US or CXR, he is also afrebile without leukocytosis; doubt there is super imposed bacterial pneumonia; if continuing azithromycin, would only give for 5 days  - pt to be discharged today w/ home hospice, ALICIA signed  - recommend send home with duonebs and nebulizer    Plan discussed w/ Dr Chery

## 2023-07-18 NOTE — DISCHARGE NOTE PROVIDER - NSDCCPCAREPLAN_GEN_ALL_CORE_FT
PRINCIPAL DISCHARGE DIAGNOSIS  Diagnosis: Acute respiratory failure with hypoxia  Assessment and Plan of Treatment: Due to parainfluenza, now improved.  Continue supportive care for upper respiratory symptoms.  Follow up with home hospice      SECONDARY DISCHARGE DIAGNOSES  Diagnosis: Sacral ulcer  Assessment and Plan of Treatment: Wound care as directed

## 2023-07-18 NOTE — CONSULT NOTE ADULT - ASSESSMENT
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 Hypoxic respiratory failure   - + Parainfluenza -- supportive care  - continue duoneb Q 6   - recommend that patient be discharged with nebulizer to help facilitate the mobilization of secretions  - continue Chest PT   - patient with advanced dementia, DNR on hospice   - family is requesting to bring patient home as soon as possible   - patient likely to discharge home today , patient does not appear to be in any distress at this time   - patient received ABX on admission , but likely viral in nature     - case discussed with Dr Schmitz, Dr Chery aware of recommendations

## 2023-07-18 NOTE — CONSULT NOTE ADULT - SUBJECTIVE AND OBJECTIVE BOX
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  As per the son, this morning patient started to have a productive cough, this afternoon he was found to have difficulty breathing which prompted the EMS call. Denies any fever/chills. Upon ED arrival patient was placed on NRB then switched to 2L NC.  Upon evaluation, patient was on RA saturating 97%, no acute distress. Patient was found to be positive for Para-Influenza 3. Afebrile, no Leukocytosis.   Patient is currently full code, son is considering DNR.    (17 Jul 2023 03:22)    PAST MEDICAL & SURGICAL HISTORY:  DM (diabetes mellitus)  CHF (congestive heart failure)    FAMILY HISTORY:  SOCIAL HISTORY:  Smoking: [ ] Never Smoked [ ] Former Smoker (__ packs x ___ years) [ ] Current Smoker  (__ packs x ___ years)  Substance Use: [ ] Never Used [ ] Used ____  EtOH Use:  Marital Status: [ ] Single [ ]  [ ]  [ ]   Sexual History:   Occupation:  Recent Travel:  Country of Birth:  Advance Directives: DNR     Allergies  No Known Allergies      HOME MEDICATIONS:    REVIEW OF SYSTEMS:  [ X] Unable to assess ROS because MS / Dementia     OBJECTIVE:  ICU Vital Signs Last 24 Hrs  T(C): 36.4 (18 Jul 2023 10:30), Max: 36.9 (17 Jul 2023 16:16)  T(F): 97.6 (18 Jul 2023 10:30), Max: 98.5 (17 Jul 2023 16:16)  HR: 79 (18 Jul 2023 10:39) (79 - 107)  BP: 142/86 (18 Jul 2023 10:30) (142/86 - 172/98)    RR: 18 (18 Jul 2023 10:30) (17 - 19)  SpO2: 98% (18 Jul 2023 10:39) (95% - 98%)    O2 Parameters below as of 18 Jul 2023 10:39  Patient On (Oxygen Delivery Method): room air      CAPILLARY BLOOD GLUCOSE  POCT Blood Glucose.: 200 mg/dL (17 Jul 2023 08:23)      PHYSICAL EXAM:  General:  awake , NAD   Neck: NO JVD   Respiratory: + scattered rhonchi, NO wheexzing   Cardiovascular: S1S2 RRR   Abdomen:  soft + BS   Extremities: NO EDEMA   Skin: Intact   Neurological: No deficits     HOSPITAL MEDICATIONS:  apixaban 2.5 milliGRAM(s) Oral two times a day  aspirin  chewable 81 milliGRAM(s) Oral daily  azithromycin  IVPB 500 milliGRAM(s) IV Intermittent every 24 hours  hydrALAZINE 25 milliGRAM(s) Oral three times a day  isosorbide   mononitrate ER Tablet (IMDUR) 60 milliGRAM(s) Oral daily  atorvastatin 40 milliGRAM(s) Oral at bedtime  dextrose 50% Injectable 25 Gram(s) IV Push once  dextrose Oral Gel 15 Gram(s) Oral once PRN  glucagon  Injectable 1 milliGRAM(s) IntraMuscular once  insulin lispro (ADMELOG) corrective regimen sliding scale   SubCutaneous three times a day before meals  albuterol/ipratropium for Nebulization 3 milliLiter(s) Nebulizer every 6 hours  acetaminophen     Tablet .. 650 milliGRAM(s) Oral every 6 hours PRN  LORazepam   Injectable 1 milliGRAM(s) IntraMuscular once  melatonin 3 milliGRAM(s) Oral at bedtime  OLANZapine 2.5 milliGRAM(s) Oral two times a day PRN  pantoprazole    Tablet 40 milliGRAM(s) Oral before breakfast  dextrose 5%. 1000 milliLiter(s) IV Continuous <Continuous>  multivitamin/minerals 1 Tablet(s) Oral daily      LABS:                        11.0   8.56  )-----------( 155      ( 18 Jul 2023 05:56 )             35.8     Hgb Trend: 11.0<--, 10.3<--  07-17    138  |  108  |  48<H>  ----------------------------<  214<H>  4.8   |  21<L>  |  4.90<H>    Ca    8.6      17 Jul 2023 00:20  Phos  3.6     07-17  Mg     2.0     07-17    TPro  6.7  /  Alb  2.2<L>  /  TBili  0.6  /  DBili  x   /  AST  19  /  ALT  18  /  AlkPhos  87  07-17    Creatinine Trend: 4.90<--  PT/INR - ( 17 Jul 2023 00:20 )   PT: 13.3 sec;   INR: 1.12 ratio         PTT - ( 17 Jul 2023 00:20 )  PTT:27.3 sec  Urinalysis Basic - ( 17 Jul 2023 00:20 )    Color: x / Appearance: x / SG: x / pH: x  Gluc: 214 mg/dL / Ketone: x  / Bili: x / Urobili: x   Blood: x / Protein: x / Nitrite: x   Leuk Esterase: x / RBC: x / WBC x   Sq Epi: x / Non Sq Epi: x / Bacteria: x    Venous Blood Gas:  07-17 @ 00:05  7.28/48/39/23/63.2  VBG Lactate: 4.40    + PAra influenza     EKG: CHIEF COMPLAINT:  Respiratory distress     HPI:  83 year old male who is currently on home Hospice  with a PMH of 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  As per the son, this morning patient started to have a productive cough, this afternoon he was found to have difficulty breathing which prompted the EMS call. Denies any fever/chills. Upon ED arrival patient was placed on NRB then switched to 2L NC.  Upon evaluation, patient was on RA saturating 97%, no acute distress. Patient was found to be positive for Para-Influenza 3. Afebrile, no Leukocytosis.   Patient is currently full code, son is considering DNR.    (17 Jul 2023 03:22)    PAST MEDICAL & SURGICAL HISTORY:  DM (diabetes mellitus)  CHF (congestive heart failure)    FAMILY HISTORY:  SOCIAL HISTORY:  Smoking: [ ] Never Smoked [ ] Former Smoker (__ packs x ___ years) [ ] Current Smoker  (__ packs x ___ years)  Substance Use: [ ] Never Used [ ] Used ____  EtOH Use:  Marital Status: [ ] Single [ ]  [ ]  [ ]   Sexual History:   Occupation:  Recent Travel:  Country of Birth:  Advance Directives: DNR     Allergies  No Known Allergies      HOME MEDICATIONS:    REVIEW OF SYSTEMS:  [ X] Unable to assess ROS because MS / Dementia     OBJECTIVE:  ICU Vital Signs Last 24 Hrs  T(C): 36.4 (18 Jul 2023 10:30), Max: 36.9 (17 Jul 2023 16:16)  T(F): 97.6 (18 Jul 2023 10:30), Max: 98.5 (17 Jul 2023 16:16)  HR: 79 (18 Jul 2023 10:39) (79 - 107)  BP: 142/86 (18 Jul 2023 10:30) (142/86 - 172/98)    RR: 18 (18 Jul 2023 10:30) (17 - 19)  SpO2: 98% (18 Jul 2023 10:39) (95% - 98%)    O2 Parameters below as of 18 Jul 2023 10:39  Patient On (Oxygen Delivery Method): room air      CAPILLARY BLOOD GLUCOSE  POCT Blood Glucose.: 200 mg/dL (17 Jul 2023 08:23)      PHYSICAL EXAM:  General:  awake , NAD   Neck: NO JVD   Respiratory: + scattered rhonchi, NO wheexzing   Cardiovascular: S1S2 RRR   Abdomen:  soft + BS   Extremities: NO EDEMA   Skin: Intact   Neurological: No deficits     HOSPITAL MEDICATIONS:  apixaban 2.5 milliGRAM(s) Oral two times a day  aspirin  chewable 81 milliGRAM(s) Oral daily  azithromycin  IVPB 500 milliGRAM(s) IV Intermittent every 24 hours  hydrALAZINE 25 milliGRAM(s) Oral three times a day  isosorbide   mononitrate ER Tablet (IMDUR) 60 milliGRAM(s) Oral daily  atorvastatin 40 milliGRAM(s) Oral at bedtime  dextrose 50% Injectable 25 Gram(s) IV Push once  dextrose Oral Gel 15 Gram(s) Oral once PRN  glucagon  Injectable 1 milliGRAM(s) IntraMuscular once  insulin lispro (ADMELOG) corrective regimen sliding scale   SubCutaneous three times a day before meals  albuterol/ipratropium for Nebulization 3 milliLiter(s) Nebulizer every 6 hours  acetaminophen     Tablet .. 650 milliGRAM(s) Oral every 6 hours PRN  LORazepam   Injectable 1 milliGRAM(s) IntraMuscular once  melatonin 3 milliGRAM(s) Oral at bedtime  OLANZapine 2.5 milliGRAM(s) Oral two times a day PRN  pantoprazole    Tablet 40 milliGRAM(s) Oral before breakfast  dextrose 5%. 1000 milliLiter(s) IV Continuous <Continuous>  multivitamin/minerals 1 Tablet(s) Oral daily      LABS:                        11.0   8.56  )-----------( 155      ( 18 Jul 2023 05:56 )             35.8     Hgb Trend: 11.0<--, 10.3<--  07-17    138  |  108  |  48<H>  ----------------------------<  214<H>  4.8   |  21<L>  |  4.90<H>    Ca    8.6      17 Jul 2023 00:20  Phos  3.6     07-17  Mg     2.0     07-17    TPro  6.7  /  Alb  2.2<L>  /  TBili  0.6  /  DBili  x   /  AST  19  /  ALT  18  /  AlkPhos  87  07-17    Creatinine Trend: 4.90<--  PT/INR - ( 17 Jul 2023 00:20 )   PT: 13.3 sec;   INR: 1.12 ratio         PTT - ( 17 Jul 2023 00:20 )  PTT:27.3 sec  Urinalysis Basic - ( 17 Jul 2023 00:20 )    Color: x / Appearance: x / SG: x / pH: x  Gluc: 214 mg/dL / Ketone: x  / Bili: x / Urobili: x   Blood: x / Protein: x / Nitrite: x   Leuk Esterase: x / RBC: x / WBC x   Sq Epi: x / Non Sq Epi: x / Bacteria: x    Venous Blood Gas:  07-17 @ 00:05  7.28/48/39/23/63.2  VBG Lactate: 4.40    + PAra influenza     EKG:

## 2023-07-18 NOTE — DISCHARGE NOTE PROVIDER - HOSPITAL COURSE
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  As per the son, this morning patient started to have a productive cough, this afternoon he was found to have difficulty breathing which prompted the EMS call. Denies any fever/chills. Upon ED arrival patient was placed on NRB then switched to 2L NC.  Upon evaluation, patient was on RA saturating 97%, no acute distress. Patient was found to be positive for Para-Influenza 3. Afebrile, no Leukocytosis.   Patient is currently full code, son is considering DNR.        Problem/Plan - 1:  ·  Problem: Acute respiratory failure with hypoxia.   ·  Plan: Acute respiratory distress most likely secondary to Para-influenza   - Monitor respiratory status   - O2 2L NC as needed   - Duoneb PRN  - Tylenol for fever.     Problem/Plan - 2:  ·  Problem: Pneumonia.   ·  Plan: Azithromycin  Tylenol for fever   Monitor.     Problem/Plan - 3:  ·  Problem: Diabetes mellitus.   ·  Plan: ISS with hypoglycemic protocol  Monitor.     Problem/Plan - 4:  ·  Problem: Hypertension.   ·  Plan: Continue home Meds   Monitor BP.     Problem/Plan - 5:  ·  Problem: Congestive heart disease.   ·  Plan: Fluid restriction   Daily I&O   Continue home meds.     Problem/Plan - 6:  ·  Problem: End stage renal disease.   ·  Plan: Monitor   Avoid nephrotoxic drugs.      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  As per the son, this morning patient started to have a productive cough, this afternoon he was found to have difficulty breathing which prompted the EMS call. Denies any fever/chills. Upon ED arrival patient was placed on NRB then switched to 2L NC.  Upon evaluation, patient was on RA saturating 97%, no acute distress. Patient was found to be positive for Para-Influenza 3. Afebrile, no Leukocytosis.          Problem/Plan - 1:  ·  Problem: Acute respiratory failure with hypoxia.   ·  Plan: Acute respiratory distress most likely secondary to Para-influenza   - Monitor respiratory status   - O2 2L NC as needed   - Duoneb PRN  - Tylenol for fever.       Problem/Plan - 3:  ·  Problem: Diabetes mellitus.   ·  Plan: ISS with hypoglycemic protocol  Monitor.     Problem/Plan - 4:  ·  Problem: Hypertension.   ·  Plan: Continue home Meds   Monitor BP.     Problem/Plan - 5:  ·  Problem: Congestive heart disease.   ·  Plan: Fluid restriction   Daily I&O   Continue home meds.     Problem/Plan - 6:  ·  Problem: End stage renal disease.   ·  Plan: Monitor   Avoid nephrotoxic drugs.      Sacral ulcer, local wound care with collagenase.  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  As per the son, this morning patient started to have a productive cough, this afternoon he was found to have difficulty breathing which prompted the EMS call. Denies any fever/chills. Upon ED arrival patient was placed on NRB then switched to 2L NC.  Upon evaluation, patient was on RA saturating 97%, no acute distress. Patient was found to be positive for Para-Influenza 3. Afebrile, no Leukocytosis.      Problem/Plan - 1:  ·  Problem: Acute respiratory failure with hypoxia.   ·  Plan: Acute respiratory distress most likely secondary to Para-influenza   - Monitor respiratory status   - O2 2L NC as needed   - Duoneb PRN  - Tylenol for fever.     Problem/Plan - 3:  ·  Problem: Diabetes mellitus.   ·  Plan: ISS with hypoglycemic protocol  Monitor.     Problem/Plan - 4:  ·  Problem: Hypertension.   ·  Plan: Continue home Meds   Monitor BP.     Problem/Plan - 5:  ·  Problem: Congestive heart disease.   ·  Plan: Fluid restriction   Daily I&O   Continue home meds.     Problem/Plan - 6:  ·  Problem: End stage renal disease.   ·  Plan: Monitor   Avoid nephrotoxic drugs.      Sacral ulcer, local wound care. Hospice team to provide medihoney, per discussion on 7/19  Stable for discharge back to home hospice per attending

## 2023-07-18 NOTE — CHART NOTE - NSCHARTNOTEFT_GEN_A_CORE
:  Ainsley Gama NP    INDICATION:  respiratory distress    PROCEDURE:  [ ] LIMITED ECHO  [ x] LIMITED CHEST  [ ] LIMITED RETROPERITONEAL  [ ] LIMITED ABDOMINAL  [ ] LIMITED DVT  [ ] NEEDLE GUIDANCE VASCULAR  [ ] NEEDLE GUIDANCE THORACENTESIS  [ ] NEEDLE GUIDANCE PARACENTESIS  [ ] NEEDLE GUIDANCE PERICARDIOCENTESIS  [ ] OTHER    FINDINGS:  A lines anteriorly w/ very focal B lines  Curtain sign, no consolidation seen posterior-laterally    INTERPRETATION:  likely viral pneumonia, no discreet consolidations noted    Images stored in "[employer]"
Provider called by primary nurse patient is agitated. Attempting to get out of bed. Will give Olanzapine 5mg IM and melatonin. Request for EKG to monitor QTC and recommend psych consult in AM for PRN recommendation.

## 2023-07-18 NOTE — DISCHARGE NOTE PROVIDER - NSDCFUADDINST_GEN_ALL_CORE_FT
Sacrum c stage III pressure ulcer : 1) to clean with saline and pat dry  2) Apply liquid barrier film (Cavilon) to periwound 3) apply Collegenase to the entire wound bed daily 4) cover with foam dressing. Change the dressing daily or PRN.

## 2023-07-18 NOTE — DISCHARGE NOTE PROVIDER - NSDCMRMEDTOKEN_GEN_ALL_CORE_FT
amiodarone 200 mg oral tablet: 1 tab(s) orally once a day  apixaban 2.5 mg oral tablet: 1 tab(s) orally every 12 hours  aspirin 81 mg oral tablet, chewable: 1 tab(s) orally once a day  atorvastatin 40 mg oral tablet: 1 tab(s) orally once a day (at bedtime)  brimonidine 0.2% ophthalmic solution: 1 drop(s) to each affected eye 2 times a day  dorzolamide-timolol 2%-0.5% preservative-free ophthalmic solution: 1 drop(s) to each affected eye 2 times a day  hydrALAZINE 25 mg oral tablet: 3 tab(s) orally 3 times a day  ipratropium-albuterol 0.5 mg-2.5 mg/3 mL inhalation solution: 3 milliliter(s) inhaled every 6 hours  isosorbide mononitrate 60 mg oral tablet, extended release: 1 tab(s) orally once a day  Lokelma 5 g oral powder for reconstitution: 5 gram(s) orally 3 times a day  Multiple Vitamins with Minerals oral tablet: 1 tab(s) orally once a day  Nebulizer: 1 nebulizer  pantoprazole 40 mg oral delayed release tablet: 1 tab(s) orally once a day (before a meal)  prednisoLONE acetate 1% ophthalmic suspension: 1 drop(s) to each affected eye 3 times a day  sevelamer carbonate 800 mg oral tablet: 1 tab(s) orally every 8 hours  sodium bicarbonate 650 mg oral tablet: 2 tab(s) orally 2 times a day  ZyPREXA 2.5 mg oral tablet: 1 tab(s) orally 2 times a day as needed for  agitation   amiodarone 200 mg oral tablet: 1 tab(s) orally once a day  apixaban 2.5 mg oral tablet: 1 tab(s) orally every 12 hours  aspirin 81 mg oral tablet, chewable: 1 tab(s) orally once a day  atorvastatin 40 mg oral tablet: 1 tab(s) orally once a day (at bedtime)  brimonidine 0.2% ophthalmic solution: 1 drop(s) to each affected eye 2 times a day  dorzolamide-timolol 2%-0.5% preservative-free ophthalmic solution: 1 drop(s) to each affected eye 2 times a day  hydrALAZINE 25 mg oral tablet: 3 tab(s) orally 3 times a day  ipratropium-albuterol 0.5 mg-2.5 mg/3 mL inhalation solution: 3 milliliter(s) inhaled every 6 hours  isosorbide mononitrate 60 mg oral tablet, extended release: 1 tab(s) orally once a day  Lokelma 5 g oral powder for reconstitution: 5 gram(s) orally 3 times a day  Multiple Vitamins with Minerals oral tablet: 1 tab(s) orally once a day  Nebulizer: 1 nebulizer  nebulizer machine: dispense 1 unit  pantoprazole 40 mg oral delayed release tablet: 1 tab(s) orally once a day (before a meal)  prednisoLONE acetate 1% ophthalmic suspension: 1 drop(s) to each affected eye 3 times a day  sevelamer carbonate 800 mg oral tablet: 1 tab(s) orally every 8 hours  sodium bicarbonate 650 mg oral tablet: 2 tab(s) orally 2 times a day  ZyPREXA 2.5 mg oral tablet: 1 tab(s) orally 2 times a day as needed for  agitation   amiodarone 200 mg oral tablet: 1 tab(s) orally once a day  apixaban 2.5 mg oral tablet: 1 tab(s) orally every 12 hours  aspirin 81 mg oral tablet, chewable: 1 tab(s) orally once a day  atorvastatin 40 mg oral tablet: 1 tab(s) orally once a day (at bedtime)  brimonidine 0.2% ophthalmic solution: 1 drop(s) to each affected eye 2 times a day  dorzolamide-timolol 2%-0.5% preservative-free ophthalmic solution: 1 drop(s) to each affected eye 2 times a day  hydrALAZINE 25 mg oral tablet: 3 tab(s) orally 3 times a day  ipratropium-albuterol 0.5 mg-2.5 mg/3 mL inhalation solution: 3 milliliter(s) by nebulizer every 6 hours  isosorbide mononitrate 60 mg oral tablet, extended release: 1 tab(s) orally once a day  Lokelma 5 g oral powder for reconstitution: 5 gram(s) orally 3 times a day  Multiple Vitamins with Minerals oral tablet: 1 tab(s) orally once a day  nebulizer machine: dispense 1 unit  pantoprazole 40 mg oral delayed release tablet: 1 tab(s) orally once a day (before a meal)  prednisoLONE acetate 1% ophthalmic suspension: 1 drop(s) to each affected eye 3 times a day  sevelamer carbonate 800 mg oral tablet: 1 tab(s) orally every 8 hours  sodium bicarbonate 650 mg oral tablet: 2 tab(s) orally 2 times a day  ZyPREXA 2.5 mg oral tablet: 1 tab(s) orally 2 times a day as needed for  agitation

## 2023-07-18 NOTE — DISCHARGE NOTE PROVIDER - ATTENDING DISCHARGE PHYSICAL EXAMINATION:
Vital Signs Last 24 Hrs  T(F): 97.8 (19 Jul 2023 11:21), Max: 98.7 (18 Jul 2023 16:01)  HR: 81 (19 Jul 2023 11:21) (65 - 104)  BP: 120/67 (19 Jul 2023 11:21) (106/64 - 147/92)  RR: 16 (19 Jul 2023 11:21) (16 - 19)  SpO2: 98% (19 Jul 2023 11:21) (96% - 100%)    Physical Exam:  General: comfortable, no acute distress  Cardiovascular: normal s1s2, no murmurs, gallops or fricition rubs  Pulmonary: clear to ausculation Bilaterally, no wheezing , rhonchi  Gastrointestinal: soft non tender non distended, no masses felt  Musculoskeletal: no lower extremity edema, intact bilateral lower extremity pulses  Neurological: CN II-12 intact. No focal weakness ax01  Psychiatrical: normal mood  Skin: stage III sacral ulcer

## 2023-07-18 NOTE — DISCHARGE NOTE PROVIDER - DETAILS OF MALNUTRITION DIAGNOSIS/DIAGNOSES
This patient has been assessed with a concern for Malnutrition and was treated during this hospitalization for the following Nutrition diagnosis/diagnoses:     -  07/19/2023: Severe protein-calorie malnutrition

## 2023-07-19 ENCOUNTER — TRANSCRIPTION ENCOUNTER (OUTPATIENT)
Age: 83
End: 2023-07-19

## 2023-07-19 VITALS — OXYGEN SATURATION: 97 %

## 2023-07-19 LAB
CULTURE RESULTS: NO GROWTH — SIGNIFICANT CHANGE UP
GLUCOSE BLDC GLUCOMTR-MCNC: 164 MG/DL — HIGH (ref 70–99)
GLUCOSE BLDC GLUCOMTR-MCNC: 178 MG/DL — HIGH (ref 70–99)
GLUCOSE BLDC GLUCOMTR-MCNC: 193 MG/DL — HIGH (ref 70–99)
SPECIMEN SOURCE: SIGNIFICANT CHANGE UP

## 2023-07-19 PROCEDURE — 99239 HOSP IP/OBS DSCHRG MGMT >30: CPT

## 2023-07-19 RX ORDER — IPRATROPIUM/ALBUTEROL SULFATE 18-103MCG
3 AEROSOL WITH ADAPTER (GRAM) INHALATION
Qty: 360 | Refills: 0
Start: 2023-07-19 | End: 2023-08-17

## 2023-07-19 RX ORDER — COLLAGENASE CLOSTRIDIUM HIST. 250 UNIT/G
1 OINTMENT (GRAM) TOPICAL DAILY
Refills: 0 | Status: DISCONTINUED | OUTPATIENT
Start: 2023-07-19 | End: 2023-07-19

## 2023-07-19 RX ADMIN — Medication 2: at 17:39

## 2023-07-19 RX ADMIN — APIXABAN 2.5 MILLIGRAM(S): 2.5 TABLET, FILM COATED ORAL at 17:39

## 2023-07-19 RX ADMIN — Medication 25 MILLIGRAM(S): at 15:39

## 2023-07-19 RX ADMIN — Medication 3 MILLILITER(S): at 11:33

## 2023-07-19 RX ADMIN — Medication 25 MILLIGRAM(S): at 05:06

## 2023-07-19 RX ADMIN — Medication 1 TABLET(S): at 11:26

## 2023-07-19 RX ADMIN — APIXABAN 2.5 MILLIGRAM(S): 2.5 TABLET, FILM COATED ORAL at 05:07

## 2023-07-19 RX ADMIN — Medication 650 MILLIGRAM(S): at 05:07

## 2023-07-19 RX ADMIN — Medication 650 MILLIGRAM(S): at 05:56

## 2023-07-19 RX ADMIN — Medication 3 MILLILITER(S): at 17:14

## 2023-07-19 RX ADMIN — PANTOPRAZOLE SODIUM 40 MILLIGRAM(S): 20 TABLET, DELAYED RELEASE ORAL at 05:06

## 2023-07-19 RX ADMIN — Medication 3 MILLILITER(S): at 05:32

## 2023-07-19 RX ADMIN — Medication 81 MILLIGRAM(S): at 11:26

## 2023-07-19 RX ADMIN — ISOSORBIDE MONONITRATE 60 MILLIGRAM(S): 60 TABLET, EXTENDED RELEASE ORAL at 11:26

## 2023-07-19 NOTE — DIETITIAN INITIAL EVALUATION ADULT - PERTINENT LABORATORY DATA
POCT Blood Glucose.: 178 mg/dL (07-19-23 @ 08:07)  A1C with Estimated Average Glucose Result: 8.4 % (07-18-23 @ 05:56)  A1C with Estimated Average Glucose Result: 7.1 % (05-30-23 @ 07:02)  A1C with Estimated Average Glucose Result: 6.8 % (02-18-23 @ 07:05)

## 2023-07-19 NOTE — DIETITIAN INITIAL EVALUATION ADULT - DIET TYPE
pt noted on safety tray; Suplena x 2/day (provides 850 kcal, 22 g protein)/low sodium/60 gm protein/consistent carbohydrate (evening snack)/easy to chew

## 2023-07-19 NOTE — PROGRESS NOTE ADULT - SUBJECTIVE AND OBJECTIVE BOX
CHIEF COMPLAINT:  ===============  SOB    INTERVAL EVENTS:  ==================  -sleeping comfortably   - stayed overnight - plan to d/c today   -afebrile tmax 98.7        REVIEW OF SYSTEMS:  ==================  - no fever, no chills  - no HA, no dizziness  - no visual changes, no auditory changes  - no sore throat, no sinus congestion  - no SOB, no cough  - no chest pain, no palpitations  - no abdominal pain, no N/V/D  - no dysuria, no hematuria  - no myalgias, no arthralgias  - no pain in extremity, no swelling   - no rashes, no pruritis  - no neuro deficits  - no psychiatric concerns       HPI:    83 year old male who is currently on home Hospice  with a PMH of 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  As per the son, this morning patient started to have a productive cough, this afternoon he was found to have difficulty breathing which prompted the EMS call. Denies any fever/chills. Upon ED arrival patient was placed on NRB then switched to 2L NC.  Upon evaluation, patient was on RA saturating 97%, no acute distress. Patient was found to be positive for Para-Influenza 3. Afebrile, no Leukocytosis.   Patient is currently full code, son is considering DNR.   OBJECTIVE:  ===========    ICU Vital Signs Last 24 Hrs  T(C): 36.6 (2023 05:32), Max: 37.1 (2023 16:01)  T(F): 97.8 (2023 05:32), Max: 98.7 (2023 16:01)  HR: 85 (2023 09:00) (65 - 104)  BP: 129/76 (2023 09:00) (106/64 - 147/92)  RR: 18 (2023 09:00) (18 - 19)  SpO2: 100% (2023 09:00) (96% - 100%)    O2 Parameters below as of 2023 09:00  Patient On (Oxygen Delivery Method): room air      07-18 @ 07:01  -   @ 07:00  --------------------------------------------------------  IN: 120 mL / OUT: 325 mL / NET: -205 mL      CAPILLARY BLOOD GLUCOSE  POCT Blood Glucose.: 178 mg/dL (2023 08:07)      PHYSICAL EXAM:  ==============  General: sleeping , comfortable   Respiratory: CTA B/L with few scattered rhonchi  NO wheezing   Cardiovascular: S1S2 RRR NO M/R   Abdomen: soft + BS   Extremities: Shay edema  Skin: Intact   Neurological: No deficit   Psychiatry: appropriate     HOSPITAL MEDICATIONS:  ======================  apixaban 2.5 milliGRAM(s) Oral two times a day  aspirin  chewable 81 milliGRAM(s) Oral daily  azithromycin  IVPB 500 milliGRAM(s) IV Intermittent every 24 hours  hydrALAZINE 25 milliGRAM(s) Oral three times a day  isosorbide   mononitrate ER Tablet (IMDUR) 60 milliGRAM(s) Oral daily  atorvastatin 40 milliGRAM(s) Oral at bedtime  dextrose 50% Injectable 25 Gram(s) IV Push once  dextrose Oral Gel 15 Gram(s) Oral once PRN  glucagon  Injectable 1 milliGRAM(s) IntraMuscular once  insulin lispro (ADMELOG) corrective regimen sliding scale   SubCutaneous three times a day before meals  albuterol/ipratropium for Nebulization 3 milliLiter(s) Nebulizer every 6 hours  acetaminophen     Tablet .. 650 milliGRAM(s) Oral every 6 hours PRN  LORazepam   Injectable 1 milliGRAM(s) IntraMuscular once  melatonin 3 milliGRAM(s) Oral at bedtime  OLANZapine 2.5 milliGRAM(s) Oral two times a day PRN  pantoprazole    Tablet 40 milliGRAM(s) Oral before breakfast  dextrose 5%. 1000 milliLiter(s) IV Continuous <Continuous>  multivitamin/minerals 1 Tablet(s) Oral daily      LABS:  =====                        11.0   8.56  )-----------( 155      ( 2023 05:56 )             35.8     Hgb Trend: 11.0<--, 10.3<--        Creatinine Trend: 4.90<--  Lactate, Blood: 5.4  Lactate, Blood: 1.8  Urinalysis Basic - ( 2023 16:00 )    Color: Yellow / Appearance: Clear / S.015 / pH: x  Gluc: x / Ketone: Negative  / Bili: Negative / Urobili: Negative mg/dL   Blood: x / Protein: 500 mg/dL / Nitrite: Negative   Leuk Esterase: Negative / RBC: Negative /HPF / WBC Negative   Sq Epi: x / Non Sq Epi: x / Bacteria: Negative      MICROBIOLOGY:   Culture - Blood (collected 23 @ 13:10)  Source: .Blood Blood-Peripheral  Preliminary Report (23 @ 09:02):    No growth at 48 Hours    Culture - Blood (collected 23 @ 13:10)  Source: .Blood Blood-Venous  Preliminary Report (23 @ 09:02):    No growth at 48 Hours    azithromycin  IVPB: 255  cefTRIAXone   IVPB: 100      RADIOLOGY:  ==========  < from: Xray Chest 1 View- PORTABLE-Urgent (Xray Chest 1 View- PORTABLE-Urgent .) (23 @ 00:46) >  FINDINGS/  IMPRESSION:  Increased airspace opacities right lung may represent infiltrates in the   correct clinical setting. Correlate clinically and follow-up recommended.    Trace right pleural effusion    Heart size cannot be accurately assessed in this projection, but appear   enlarged.      PULMONARY:  ============  - on room air     CARDIAC:  ========  
patient seen and examined  confused  vitals: low grade temp  Stage III sacral ulcer noted  Patient geared for  return to hospice tomorrow?   consulted vascular for possible debridement    Review of Systems:  unable     Objective:  Vitals  T(C): 37.1 (07-18-23 @ 16:01), Max: 37.1 (07-18-23 @ 16:01)  HR: 86 (07-18-23 @ 16:01) (79 - 103)  BP: 144/90 (07-18-23 @ 16:01) (142/86 - 167/105)  RR: 19 (07-18-23 @ 16:01) (18 - 19)  SpO2: 96% (07-18-23 @ 16:01) (95% - 98%)    Physical Exam:  General: comfortable, no acute distress  HEENT: Atraumatic, no LAD, trachea midline, PERRLA  Cardiovascular: normal s1s2, no murmurs, gallops or fricition rubs  Pulmonary: clear to ausculation Bilaterally, no wheezing , rhonchi  Gastrointestinal: soft non tender non distended, no masses felt, no organomegally  Muscloskeletal: no lower extremity edema, intact bilateral lower extremity pulses  Neurological: CN II-12 intact. No focal weakness ax01  Psychiatrical: normal mood  Skin: stage III     Labs:                          11.0   8.56  )-----------( 155      ( 18 Jul 2023 05:56 )             35.8     07-17    138  |  108  |  48<H>  ----------------------------<  214<H>  4.8   |  21<L>  |  4.90<H>    Ca    8.6      17 Jul 2023 00:20  Phos  3.6     07-17  Mg     2.0     07-17    TPro  6.7  /  Alb  2.2<L>  /  TBili  0.6  /  DBili  x   /  AST  19  /  ALT  18  /  AlkPhos  87  07-17    LIVER FUNCTIONS - ( 17 Jul 2023 00:20 )  Alb: 2.2 g/dL / Pro: 6.7 gm/dL / ALK PHOS: 87 U/L / ALT: 18 U/L / AST: 19 U/L / GGT: x           PT/INR - ( 17 Jul 2023 00:20 )   PT: 13.3 sec;   INR: 1.12 ratio         PTT - ( 17 Jul 2023 00:20 )  PTT:27.3 sec      Active Medications  MEDICATIONS  (STANDING):  albuterol/ipratropium for Nebulization 3 milliLiter(s) Nebulizer every 6 hours  apixaban 2.5 milliGRAM(s) Oral two times a day  aspirin  chewable 81 milliGRAM(s) Oral daily  atorvastatin 40 milliGRAM(s) Oral at bedtime  azithromycin  IVPB 500 milliGRAM(s) IV Intermittent every 24 hours  dextrose 5%. 1000 milliLiter(s) (50 mL/Hr) IV Continuous <Continuous>  dextrose 50% Injectable 25 Gram(s) IV Push once  glucagon  Injectable 1 milliGRAM(s) IntraMuscular once  hydrALAZINE 25 milliGRAM(s) Oral three times a day  insulin lispro (ADMELOG) corrective regimen sliding scale   SubCutaneous three times a day before meals  isosorbide   mononitrate ER Tablet (IMDUR) 60 milliGRAM(s) Oral daily  LORazepam   Injectable 1 milliGRAM(s) IntraMuscular once  melatonin 3 milliGRAM(s) Oral at bedtime  multivitamin/minerals 1 Tablet(s) Oral daily  pantoprazole    Tablet 40 milliGRAM(s) Oral before breakfast    MEDICATIONS  (PRN):  acetaminophen     Tablet .. 650 milliGRAM(s) Oral every 6 hours PRN Temp greater or equal to 38C (100.4F), Mild Pain (1 - 3)  dextrose Oral Gel 15 Gram(s) Oral once PRN Blood Glucose LESS THAN 70 milliGRAM(s)/deciliter  OLANZapine 2.5 milliGRAM(s) Oral two times a day PRN for agitation

## 2023-07-19 NOTE — DIETITIAN INITIAL EVALUATION ADULT - OTHER INFO
Pt presents from home where he was under hospice service with 24 hour HHA.   Pt with T2DM; no home DM meds listed in chart. HbA1c 8.4% indicates fair blood glucose management.  Per previous RD note (02/26/2023) pt weighed 77.1kg. Weight loss as noted below.  Pt with CKD5- per chart not pursuing HD at this time.  RD remains available.

## 2023-07-19 NOTE — DIETITIAN INITIAL EVALUATION ADULT - ETIOLOGY
Inadequate protein-energy intake and increased protein-energy in setting of CKD5, stage III pressure injury, dementia

## 2023-07-19 NOTE — PHYSICAL THERAPY INITIAL EVALUATION ADULT - GENERAL OBSERVATIONS, REHAB EVAL
Droplet/contact precaution maintained throughout P.T. intervention. PCA present for constant observation. Pt was seen in R lateral sidelying, lethargic and able to be aroused from time to time. Pt is oriented to name and unable to follow any command. Pt is totally dependent in all functional mobility. P.T. wound care initial evaluation performed. Sacrum c stage III pressure ulcer.  Wound was  cleaned, dressing applied and documented in flowsheet A&I  and c recommendation in flowsheet plan of care.

## 2023-07-19 NOTE — DIETITIAN NUTRITION RISK NOTIFICATION - TREATMENT: THE FOLLOWING DIET HAS BEEN RECOMMENDED
Diet, Consistent Carbohydrate w/Evening Snack:   60 gm Protein (PRO60G)  Low Sodium  Supplement Feeding Modality:  Oral  Suplena Cans or Servings Per Day:  1       Frequency:  Two Times a day (07-19-23 @ 11:19) [Pending Verification By Attending]  Diet, Consistent Carbohydrate/No Snacks (07-17-23 @ 02:49) [Active]

## 2023-07-19 NOTE — DIETITIAN INITIAL EVALUATION ADULT - PERTINENT MEDS FT
MEDICATIONS  (STANDING):  albuterol/ipratropium for Nebulization 3 milliLiter(s) Nebulizer every 6 hours  apixaban 2.5 milliGRAM(s) Oral two times a day  aspirin  chewable 81 milliGRAM(s) Oral daily  atorvastatin 40 milliGRAM(s) Oral at bedtime  azithromycin  IVPB 500 milliGRAM(s) IV Intermittent every 24 hours  dextrose 5%. 1000 milliLiter(s) (50 mL/Hr) IV Continuous <Continuous>  dextrose 50% Injectable 25 Gram(s) IV Push once  glucagon  Injectable 1 milliGRAM(s) IntraMuscular once  hydrALAZINE 25 milliGRAM(s) Oral three times a day  insulin lispro (ADMELOG) corrective regimen sliding scale   SubCutaneous three times a day before meals  isosorbide   mononitrate ER Tablet (IMDUR) 60 milliGRAM(s) Oral daily  LORazepam   Injectable 1 milliGRAM(s) IntraMuscular once  melatonin 3 milliGRAM(s) Oral at bedtime  multivitamin/minerals 1 Tablet(s) Oral daily  pantoprazole    Tablet 40 milliGRAM(s) Oral before breakfast    MEDICATIONS  (PRN):  acetaminophen     Tablet .. 650 milliGRAM(s) Oral every 6 hours PRN Temp greater or equal to 38C (100.4F), Mild Pain (1 - 3)  dextrose Oral Gel 15 Gram(s) Oral once PRN Blood Glucose LESS THAN 70 milliGRAM(s)/deciliter  OLANZapine 2.5 milliGRAM(s) Oral two times a day PRN for agitation

## 2023-07-19 NOTE — PROGRESS NOTE ADULT - ASSESSMENT
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  As per the son, this morning patient started to have a productive cough, this afternoon he was found to have difficulty breathing which prompted the EMS call. Denies any fever/chills. Upon ED arrival patient was placed on NRB then switched to 2L NC.  Upon evaluation, patient was on RA saturating 97%, no acute distress. Patient was found to be positive for Para-Influenza 3. Afebrile, no Leukocytosis.   Patient is currently full code, son is considering DNR. 
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 Hypoxic respiratory failure   - patient appears much more comfortable today   - on Room air O2 saturation 97%  - BS improved, CTA mostly   - + Parainfluenza -- supportive care  - continue duoneb Q 6 , recommend that patient be discharge with duoneb/ nebulizer for home   - continue Chest PT  to help facilitate the mobilization of secretions  - patient with advanced dementia, DNR on hospice   - family is requesting to bring patient home as soon as possible   - patient stayed overnight for further monitoring, now planned to discharge later today   - patient received ABX on admission , but likely viral in nature     - case discussed with Dr Schmitz,

## 2023-07-19 NOTE — PHARMACOTHERAPY INTERVENTION NOTE - COMMENTS
[address] [facility] 
 
 
 [address] [address] 
[phone] Patient: Danielle Galloway MRN: [id number] Visit Information Date & Time Provider Department Dept. Phone Encounter #  
 7/31/2018  2:05 PM Amado Reddy  [facility] [phone] [id number] Follow-up Instructions Return in about 1 month (around 8/31/2018), or as necessary, for Follow up after surgery. Follow-up and Disposition History Your Appointments 8/24/2018  8:00 AM  
ROUTINE CARE with Lauren Almendarez MD  
[address] [facility]) Appt Note: 4 mnth fu /est pt /refills/labs/HTN  
 [address] [address]  
[place] [address] [address] Upcoming Health Maintenance Date Due  
 GLAUCOMA SCREENING Q2Y 3/18/2017 Influenza Age 5 to Adult 8/1/2018 MEDICARE YEARLY EXAM 9/23/2018 Pneumococcal 65+ High/Highest Risk (2 of 2 - PPSV23) 9/1/2020 DTaP/Tdap/Td series (2 - Td) 11/11/2024 COLONOSCOPY 10/4/2026 Allergies as of 7/31/2018  Review Complete On: 7/31/2018 By: Amado Reddy MD  
  
 Severity Noted Reaction Type Reactions Lipitor [Atorvastatin]  04/27/2010    Other (comments) Other reaction(s): Cramps Myalgia Simvastatin  04/27/2010    Other (comments) Other reaction(s): Cramps 
myalgia Current Immunizations  Reviewed on 10/11/2017 Name Date Influenza High Dose Vaccine PF 10/11/2017 Influenza Vaccine 10/23/2013 Influenza Vaccine (Quad) PF 9/21/2016, 10/16/2015 Influenza Vaccine Split 10/26/2012, 10/21/2011, 11/10/2010, 12/3/2009 Pneumococcal Conjugate (PCV-13) 11/30/2016 Pneumococcal Polysaccharide (PPSV-23) 9/1/2015 Tdap 11/11/2014 Not reviewed this visit You Were Diagnosed With   
  
 Codes Comments Per pulm, symptoms are more likely due to viral infection (parainfluenza+). Recommended to dc azithromycin.  Preop general physical exam    -  Primary ICD-10-CM: J66.292 ICD-9-CM: V72.83 For abdominal abscess. Vitals BP Pulse Temp Resp Height(growth percentile) Weight(growth percentile) 138/81 (BP 1 Location: Left arm, BP Patient Position: Sitting) 70 98.4 °F (36.9 °C) (Oral) 18 5' 11\" (1.803 m) 206 lb 3.2 oz (93.5 kg) SpO2 BMI Smoking Status 96% 28.76 kg/m2 Former Smoker Vitals History BMI and BSA Data Body Mass Index Body Surface Area 28.76 kg/m 2 2.16 m 2 Preferred Pharmacy Pharmacy Name Phone [address] [place], [place] - [address] [phone] Your Updated Medication List  
  
   
This list is accurate as of 7/31/18  2:25 PM.  Always use your most recent med list. amLODIPine 2.5 mg tablet Commonly known as:  Horris Bills TAKE ONE TABLET BY MOUTH EVERY DAY  
  
 aspirin 325 mg tablet Commonly known as:  ASPIRIN Take 1 Tab by mouth daily. avanafil 100 mg tablet Commonly known as:  VVBASJL Take  by mouth as needed for Other. cetirizine 10 mg tablet Commonly known as:  ZYRTEC Take 1 Tab by mouth daily. fexofenadine 180 mg tablet Commonly known as:  Tami Holts Take 1 Tab by mouth nightly. FISH OIL 1,000 mg Cap Generic drug:  omega-3 fatty acids-vitamin e Take 1 Cap by mouth two (2) times a day. fluticasone 50 mcg/actuation nasal spray Commonly known as:  FLONASE  
USE 2 SPRAYS IN EACH NOSTRIL DAILY * magnesium oxide 400 mg tablet Commonly known as:  MAG-OX Take 400 mg by mouth daily. * magnesium oxide 400 mg tablet Commonly known as:  MAG-OXIDE Take 1 Tab by mouth daily. pravastatin 40 mg tablet Commonly known as:  PRAVACHOL  
TAKE ONE TABLET BY MOUTH AT NIGHT ON MONDAY, [facility] & FRIDAY Indications: hyperlipidemia Senna 8.6 mg tablet Generic drug:  senna Take 1 Tab by mouth daily. SYNTHROID 150 mcg tablet Generic drug:  levothyroxine TAKE 1 TABLET BY MOUTH DAILY BEFORE BREAKFAST  
  
 VITAMIN B-12 1,000 mcg tablet Generic drug:  cyanocobalamin Take 1,000 mcg by mouth daily. * Notice: This list has 2 medication(s) that are the same as other medications prescribed for you. Read the directions carefully, and ask your doctor or other care provider to review them with you. Follow-up Instructions Return in about 1 month (around 8/31/2018), or as necessary, for Follow up after surgery. Patient Instructions Skin Abscess: Care Instructions Your Care Instructions A skin abscess is a bacterial infection that forms a pocket of pus. A boil is a kind of skin abscess. The doctor may have cut an opening in the abscess so that the pus can drain out. You may have gauze in the cut so that the abscess will stay open and keep draining. You may need antibiotics. You will need to follow up with your doctor to make sure the infection has gone away. The doctor has checked you carefully, but problems can develop later. If you notice any problems or new symptoms, get medical treatment right away. Follow-up care is a key part of your treatment and safety. Be sure to make and go to all appointments, and call your doctor if you are having problems. It's also a good idea to know your test results and keep a list of the medicines you take. How can you care for yourself at home? · Apply warm and dry compresses, a heating pad set on low, or a hot water bottle 3 or 4 times a day for pain. Keep a cloth between the heat source and your skin. · If your doctor prescribed antibiotics, take them as directed. Do not stop taking them just because you feel better. You need to take the full course of antibiotics. · Take pain medicines exactly as directed. ¨ If the doctor gave you a prescription medicine for pain, take it as prescribed.  
¨ If you are not taking a prescription pain medicine, ask your doctor if you can take an over-the-counter medicine. · Keep your bandage clean and dry. Change the bandage whenever it gets wet or dirty, or at least one time a day. · If the abscess was packed with gauze: 
¨ Keep follow-up appointments to have the gauze changed or removed. If the doctor instructed you to remove the gauze, gently pull out all of the gauze when your doctor tells you to. ¨ After the gauze is removed, soak the area in warm water for 15 to 20 minutes 2 times a day, until the wound closes. When should you call for help? Call your doctor now or seek immediate medical care if: 
  · You have signs of worsening infection, such as: 
¨ Increased pain, swelling, warmth, or redness. ¨ Red streaks leading from the infected skin. ¨ Pus draining from the wound. ¨ A fever.  
 Watch closely for changes in your health, and be sure to contact your doctor if: 
  · You do not get better as expected. Where can you learn more? Go to http://rosa-becky.info/. Enter S313 in the search box to learn more about \"Skin Abscess: Care Instructions. \" Current as of: May 10, 2017 Content Version: 11.7 © 1129-5421 [employer]. Care instructions adapted under license by [employer] (which disclaims liability or warranty for this information). If you have questions about a medical condition or this instruction, always ask your healthcare professional. [place] [address] any warranty or liability for your use of this information. Introducing [facility]! Juanis Bautista introduces [employer] patient portal. Now you can access parts of your medical record, email your doctor's office, and request medication refills online. 1. In your internet browser, go to [email or web address] 2. Click on the First Time User? Click Here link in the Sign In box. You will see the New Member Sign Up page. 3. Enter your [employer] Access Code exactly as it appears below. You will not need to use this code after youve completed the sign-up process. If you do not sign up before the expiration date, you must request a new code. · [employer] Access Code: [email or web address] Expires: 10/8/2018  9:36 AM 
 
4. Enter the last four digits of your Social Security Number (xxxx) and Date of Birth (mm/dd/yyyy) as indicated and click Submit. You will be taken to the next sign-up page. 5. Create a [employer] ID. This will be your [employer] login ID and cannot be changed, so think of one that is secure and easy to remember. 6. Create a [employer] password. You can change your password at any time. 7. Enter your Password Reset Question and Answer. This can be used at a later time if you forget your password. 8. Enter your e-mail address. You will receive e-mail notification when new information is available in [address]. 9. Click Sign Up. You can now view and download portions of your medical record. 10. Click the Download Summary menu link to download a portable copy of your medical information. If you have questions, please visit the Frequently Asked Questions section of the [employer] website. Remember, [employer] is NOT to be used for urgent needs. For medical emergencies, dial 911. Now available from your iPhone and Android! Please provide this summary of care documentation to your next provider. Your primary care clinician is listed as [address]. If you have any questions after today's visit, please call [phone].

## 2023-07-19 NOTE — DIETITIAN INITIAL EVALUATION ADULT - NSICDXPASTMEDICALHX_GEN_ALL_CORE_FT
PAST MEDICAL HISTORY:  CHF (congestive heart failure)     Dementia     DM (diabetes mellitus)     Stage 5 chronic kidney disease not on chronic dialysis

## 2023-07-19 NOTE — DISCHARGE NOTE NURSING/CASE MANAGEMENT/SOCIAL WORK - NSDCPEFALRISK_GEN_ALL_CORE
For information on Fall & Injury Prevention, visit: [email or web address] OR  [email or web address] OR  https://www.cdc.gov/steadi/patient.html

## 2023-07-19 NOTE — DISCHARGE NOTE NURSING/CASE MANAGEMENT/SOCIAL WORK - PATIENT PORTAL LINK FT
You can access the FollowMyHealth Patient Portal offered by [facility] by registering at the following website: [email or web address]. By joining [employer]’s FollowMyHealth portal, you will also be able to view your health information using other applications (apps) compatible with our system.

## 2023-07-19 NOTE — DIETITIAN INITIAL EVALUATION ADULT - PHYSCIAL ASSESSMENT
limited visual nutrition focused physical exam as pt asleep at time of visit; pt noted with periods of agitation during LOS- on constant observation

## 2023-07-19 NOTE — DIETITIAN INITIAL EVALUATION ADULT - ENERGY INTAKE
0-25% as per flow sheets. NA reports pt did not consume breakfast this morning (07/19) due to lethargy.

## 2023-07-19 NOTE — PHYSICAL THERAPY INITIAL EVALUATION ADULT - PERTINENT HX OF CURRENT PROBLEM, REHAB EVAL
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  As per the son, this morning patient started to have a productive cough, this afternoon he was found to have difficulty breathing which prompted the EMS call. Denies any fever/chills. Upon ED arrival patient was placed on NRB then switched to 2L NC.  Upon evaluation, patient was on RA saturating 97%, no acute distress. Patient was found to be positive for Para-Influenza 3. Afebrile, no Leukocytosis. Pt was admitted c Dx of acute respiratory failure with hypoxia; multifocal pneumonia.

## 2023-07-19 NOTE — PROGRESS NOTE ADULT - NS ATTEND AMEND GEN_ALL_CORE FT
83M w/ CKD, HTN, DM, HLD, CHF, Dementia. Currently on home hospice. Presented w/ respiratory distress, found to be paraflu+. Pt was never hypoxic, afebrile. Started on duonebs and nasotracheal suctioning for congestion/secretions and much improved today.    - continue duonebs for airway clearance, q6 ATC  - no evidence of discreet consolidation on US or CXR, remains afebrile; doubt there is super imposed bacterial pneumonia; plan for 5 days of azithromycin maximum  - plan for d/c back to home hospice today  - recommend send home with duonebs and nebulizer

## 2023-07-22 LAB
CULTURE RESULTS: SIGNIFICANT CHANGE UP
CULTURE RESULTS: SIGNIFICANT CHANGE UP
SPECIMEN SOURCE: SIGNIFICANT CHANGE UP
SPECIMEN SOURCE: SIGNIFICANT CHANGE UP

## 2023-07-25 DIAGNOSIS — Z51.5 ENCOUNTER FOR PALLIATIVE CARE: ICD-10-CM

## 2023-07-25 DIAGNOSIS — N18.6 END STAGE RENAL DISEASE: ICD-10-CM

## 2023-07-25 DIAGNOSIS — Z91.158 PATIENT'S NONCOMPLIANCE WITH RENAL DIALYSIS FOR OTHER REASON: ICD-10-CM

## 2023-07-25 DIAGNOSIS — F01.511 VASCULAR DEMENTIA, UNSPECIFIED SEVERITY, WITH AGITATION: ICD-10-CM

## 2023-07-25 DIAGNOSIS — E43 UNSPECIFIED SEVERE PROTEIN-CALORIE MALNUTRITION: ICD-10-CM

## 2023-07-25 DIAGNOSIS — J96.01 ACUTE RESPIRATORY FAILURE WITH HYPOXIA: ICD-10-CM

## 2023-07-25 DIAGNOSIS — Z79.82 LONG TERM (CURRENT) USE OF ASPIRIN: ICD-10-CM

## 2023-07-25 DIAGNOSIS — Z66 DO NOT RESUSCITATE: ICD-10-CM

## 2023-07-25 DIAGNOSIS — I50.9 HEART FAILURE, UNSPECIFIED: ICD-10-CM

## 2023-07-25 DIAGNOSIS — I13.2 HYPERTENSIVE HEART AND CHRONIC KIDNEY DISEASE WITH HEART FAILURE AND WITH STAGE 5 CHRONIC KIDNEY DISEASE, OR END STAGE RENAL DISEASE: ICD-10-CM

## 2023-07-25 DIAGNOSIS — E11.22 TYPE 2 DIABETES MELLITUS WITH DIABETIC CHRONIC KIDNEY DISEASE: ICD-10-CM

## 2023-07-25 DIAGNOSIS — L89.153 PRESSURE ULCER OF SACRAL REGION, STAGE 3: ICD-10-CM

## 2023-07-25 DIAGNOSIS — J12.2 PARAINFLUENZA VIRUS PNEUMONIA: ICD-10-CM

## 2023-07-25 DIAGNOSIS — I69.311 MEMORY DEFICIT FOLLOWING CEREBRAL INFARCTION: ICD-10-CM

## 2023-07-25 DIAGNOSIS — Z79.01 LONG TERM (CURRENT) USE OF ANTICOAGULANTS: ICD-10-CM

## 2023-07-25 DIAGNOSIS — Z74.01 BED CONFINEMENT STATUS: ICD-10-CM

## 2023-07-25 DIAGNOSIS — R53.2 FUNCTIONAL QUADRIPLEGIA: ICD-10-CM

## 2023-09-11 NOTE — ED PROVIDER NOTE - NSTIMEPROVIDERCAREINITIATE_GEN_ER
Bed: 20  Expected date: 9/11/23  Expected time:   Means of arrival:   Comments:  2 nd triage    16-Feb-2023 19:20

## 2023-10-25 NOTE — ED PROVIDER NOTE - PHYSICAL EXAMINATION
VITAL SIGNS: I have reviewed nursing notes and confirm.   GEN: Well-developed; well-nourished; in no acute distress. Speaking full sentences.  SKIN: Warm, pink, no rash, no diaphoresis, no cyanosis, well perfused.   HEAD: Normocephalic; atraumatic. No scalp lacerations, no abrasions.  NECK: Supple; non tender.   EYES: Pupils 3mm equal, round, reactive to light and accomodation, conjunctiva and sclera clear. Extra-ocular movements intact bilaterally.  ENT: No nasal discharge; airway clear. Trachea is midline. ORAL: No oropharyngeal exudates or erythema. Normal dentition.  CV: Regular rate and rhythm. S1, S2 normal; no murmurs, gallops, or rubs. No lower extremity pitting edema bilaterally. Capillary refill < 2 seconds throughout. Distal pulses intact 2+ throughout.  RESP: CTA bilaterally. No wheezes, rales, or rhonchi.   ABD: Normal bowel sounds, soft, non-distended, non-tender, no rebound, no guarding, no rigidity, no hepatosplenomegaly. No CVA tenderness bilaterally.   MSK: Normal range of motion and movement of RUE/LUE/LLE extremities.  (+) RIGHT leg shortened and externally rotated, good distal pulses 2+, good sensation to soft touch.  BACK: No thoracolumbar midline or paravertebral tenderness. No step-offs or obvious deformities.  NEURO: Alert & oriented x 3, Grossly unremarkable. Sensory and motor intact throughout. No focal deficits. Gait: Fluid. Normal speech and coordination.   PSYCH: Cooperative, appropriate. Pinch Graft Text: The defect edges were debeveled with a #15 scalpel blade. Given the location of the defect, shape of the defect and the proximity to free margins a pinch graft was deemed most appropriate. Using a sterile surgical marker, the primary defect shape was transferred to the donor site. The area thus outlined was incised deep to adipose tissue with a #15 scalpel blade.  The harvested graft was then trimmed of adipose tissue until only dermis and epidermis was left. The skin margins of the secondary defect were undermined to an appropriate distance in all directions utilizing iris scissors.  The secondary defect was closed with interrupted buried subcutaneous sutures.  The skin edges were then re-apposed with running  sutures.  The skin graft was then placed in the primary defect and oriented appropriately.

## 2023-11-30 NOTE — PROGRESS NOTE ADULT - ASSESSMENT
83y Male POD #1 s/p R hemiarthroplasty  - FU Dopplers/ scan  - Pain control  - WBAT  - Posterior dislocation precautions  - PT/OT/OOB  - Transfuse for Hgb <10 during first 24h postop, per Dr. Salmeron  - KEDAR AM labs  - DVT ppx: SQH--ok for lovenox if indicated, as discussed with team; would defer heparin drip if possible  - Dispo planning no chest pain, no cough, and no shortness of breath.
